# Patient Record
Sex: FEMALE | Race: WHITE | Employment: OTHER | URBAN - METROPOLITAN AREA
[De-identification: names, ages, dates, MRNs, and addresses within clinical notes are randomized per-mention and may not be internally consistent; named-entity substitution may affect disease eponyms.]

---

## 2017-01-03 ENCOUNTER — HOSPITAL ENCOUNTER (OUTPATIENT)
Dept: MAMMOGRAPHY | Age: 82
Discharge: HOME | End: 2017-01-03
Attending: FAMILY MEDICINE
Payer: MEDICARE

## 2017-01-03 DIAGNOSIS — Z12.31 ENCOUNTER FOR SCREENING MAMMOGRAM FOR BREAST CANCER: ICD-10-CM

## 2017-01-03 DIAGNOSIS — E28.39 ESTROGEN DEFICIENCY: ICD-10-CM

## 2017-01-03 PROCEDURE — 77080 DXA BONE DENSITY AXIAL: CPT

## 2017-01-03 PROCEDURE — 77063 BREAST TOMOSYNTHESIS BI: CPT

## 2017-01-04 PROBLEM — M81.0: Status: ACTIVE | Noted: 2017-01-04

## 2017-03-08 ENCOUNTER — TELEPHONE (OUTPATIENT)
Dept: FAMILY MEDICINE CLINIC | Age: 82
End: 2017-03-08

## 2017-03-08 NOTE — TELEPHONE ENCOUNTER
Patient states she [facility] last [facility]pm[facility] and they gave her 2 different medications one was called into the pharmacy -Inhaler - (has not picked up) and the other Doxycycline monohybrate -they told her not to fill the medication for 4-5 days   Patient was up all night coughing and would like to know if it would be ok for her to fill the medication.   Please call patient back at [phone]

## 2017-03-10 NOTE — TELEPHONE ENCOUNTER
Yes, I think she should take the prescriptions since she is not getting better. Please advise her she is due to be seen for her Medicare Wellness Visit; I would be happy to see her at [facility] if she wants to call [phone] for an appointment. Thank you!!  Ivon Carrizales MD 3/10/2017 8:10 AM

## 2017-03-10 NOTE — TELEPHONE ENCOUNTER
Refill request:   Requested Prescriptions     Pending Prescriptions Disp Refills    ibuprofen (MOTRIN) 600 mg tablet       Sig: Take 1 Tab by mouth two (2) times a day.

## 2017-03-10 NOTE — TELEPHONE ENCOUNTER
for return call. Dr Severino Lee is off and it doesn't look like she has ever prescribed this before. I need to talk to her about it. Spoke to pt and she states that Dr Severino Lee hasn't prescribed the med before. Pt is currently scheduled to see Dr Severino Lee o 4/18. Let pt know that she can take  mg prn and pt states that she is willing to do that. I can check with one of the other providers to see if they are ok with refilling the motrin 600 mg. I will call her back if they do. Other wise keep appt as planned and she can discuss with Dr Severino Lee.

## 2017-03-13 ENCOUNTER — TELEPHONE (OUTPATIENT)
Dept: FAMILY MEDICINE CLINIC | Age: 82
End: 2017-03-13

## 2017-03-13 RX ORDER — IBUPROFEN 600 MG/1
600 TABLET ORAL 2 TIMES DAILY
Qty: 180 TAB | Refills: 0 | Status: CANCELLED | OUTPATIENT
Start: 2017-03-13

## 2017-03-13 NOTE — TELEPHONE ENCOUNTER
If Dr Bennie Smith has never prescribed this for her before, I am not going to start something new on pt I havent seen. Looks like she has gerd issues anyway, so I am not sure that the Motrin is the best option for her anyway.  I would stick w/ otc preferably Tylenol anyway, or low dose Motrin until Dr Bennie Smith can address

## 2017-03-13 NOTE — TELEPHONE ENCOUNTER
Patient had picked up the antibiotic and is taking it. She stated she is wondering about how to take the Omeprazole, she had a couple questions about the long term use, is it safe, etc etc.     She is interested in scheduling the [facility] visit at [facility] and she stated she will call about that but is currently in the hospital with her .

## 2017-03-20 NOTE — TELEPHONE ENCOUNTER
MJ, would you check with her about what she needs now? OK to take the omeprazole for now; we can talk about long-term risks when she follows up with me.  Mary Jane Daily MD 3/20/2017 9:02 AM

## 2017-03-22 NOTE — TELEPHONE ENCOUNTER
Spoke to pt and advised that she can keep taking the omeprazole until her f/u appt. They can discuss then. Pt states understanding.

## 2017-04-18 ENCOUNTER — OFFICE VISIT (OUTPATIENT)
Dept: FAMILY MEDICINE CLINIC | Age: 82
End: 2017-04-18

## 2017-04-18 VITALS
HEART RATE: 81 BPM | HEIGHT: 62 IN | BODY MASS INDEX: 28.85 KG/M2 | RESPIRATION RATE: 18 BRPM | OXYGEN SATURATION: 97 % | SYSTOLIC BLOOD PRESSURE: 114 MMHG | WEIGHT: 156.8 LBS | DIASTOLIC BLOOD PRESSURE: 70 MMHG | TEMPERATURE: 98.1 F

## 2017-04-18 DIAGNOSIS — M79.671 RIGHT FOOT PAIN: ICD-10-CM

## 2017-04-18 DIAGNOSIS — G47.00 INSOMNIA, UNSPECIFIED TYPE: ICD-10-CM

## 2017-04-18 DIAGNOSIS — Z63.6 CAREGIVER BURDEN: ICD-10-CM

## 2017-04-18 DIAGNOSIS — M81.0 AGE-RELATED OSTEOPOROSIS WITHOUT CURRENT PATHOLOGICAL FRACTURE: ICD-10-CM

## 2017-04-18 DIAGNOSIS — Z00.00 MEDICARE ANNUAL WELLNESS VISIT, SUBSEQUENT: Primary | ICD-10-CM

## 2017-04-18 DIAGNOSIS — K21.9 GASTROESOPHAGEAL REFLUX DISEASE WITHOUT ESOPHAGITIS: ICD-10-CM

## 2017-04-18 RX ORDER — BISMUTH SUBSALICYLATE 262 MG
1 TABLET,CHEWABLE ORAL DAILY
COMMUNITY
Start: 2017-04-18

## 2017-04-18 RX ORDER — UREA 10 %
1 LOTION (ML) TOPICAL DAILY
COMMUNITY
Start: 2017-04-18 | End: 2017-10-17

## 2017-04-18 SDOH — SOCIAL STABILITY - SOCIAL INSECURITY: DEPENDENT RELATIVE NEEDING CARE AT HOME: Z63.6

## 2017-04-18 NOTE — MR AVS SNAPSHOT
Visit Information Date & Time Provider Department Dept. Phone Encounter #  
 4/18/2017  2:45 PM Albert Arriaza, [address] [phone] [id number] Follow-up Instructions Return in about 6 months (around 10/18/2017). Upcoming Health Maintenance Date Due DTaP/Tdap/Td series (1 - Tdap) 11/19/1956 ZOSTER VACCINE AGE 60> 11/19/1995 GLAUCOMA SCREENING Q2Y 11/19/2000 Pneumococcal 65+ Low/Medium Risk (1 of 2 - PCV13) 11/19/2000 MEDICARE YEARLY EXAM 11/19/2000 INFLUENZA AGE 9 TO ADULT 8/1/2016 Allergies as of 4/18/2017  Review Complete On: 4/18/2017 By: Albert Arriaza MD  
  
 Severity Noted Reaction Type Reactions Statins-hmg-coa Reductase Inhibitors  12/09/2016   Intolerance Myalgia Lots of different statins were tried Current Immunizations  Never Reviewed No immunizations on file. Not reviewed this visit You Were Diagnosed With   
  
 Codes Comments Medicare annual wellness visit, subsequent    -  Primary ICD-10-CM: Z00.00 ICD-9-CM: V70.0 Insomnia, unspecified type     ICD-10-CM: G47.00 ICD-9-CM: 780.52 Gastroesophageal reflux disease without esophagitis     ICD-10-CM: K21.9 ICD-9-CM: 530.81 Age-related osteoporosis without current pathological fracture     ICD-10-CM: M81.0 ICD-9-CM: 733.01 Right foot pain     ICD-10-CM: M79.671 ICD-9-CM: 729.5 Vitals BP Pulse Temp Resp Height(growth percentile) Weight(growth percentile) 114/70 (BP 1 Location: Right arm, BP Patient Position: Sitting) 81 98.1 °F (36.7 °C) (Oral) 18 5' 2\" (1.575 m) 156 lb 12.8 oz (71.1 kg) SpO2 BMI OB Status Smoking Status 97% 28.68 kg/m2 Postmenopausal Never Smoker Vitals History BMI and BSA Data Body Mass Index Body Surface Area  
 28.68 kg/m 2 1.76 m 2 Preferred Pharmacy Pharmacy Name Phone  [address] [facility] [phone] Your Updated Medication List  
  
   
This list is accurate as of: 17  4:04 PM.  Always use your most recent med list.  
  
  
  
  
 acyclovir 400 mg tablet Commonly known as:  ZOVIRAX Take 1 Tab by mouth two (2) times a day. To prevent cold sores CALCIUM 500 500 mg calcium (1,250 mg) chewable tablet Generic drug:  calcium carbonate Take 1 Tab by mouth daily. diph,Pertuss(Acell),Tet Vac-PF 2 Lf-(2.5-5-3-5 mcg)-5Lf/0.5 mL susp Commonly known as:  ADACEL  
0.5 mL by IntraMUSCular route once for 1 dose. FISH OIL PO Take  by mouth. 2 tablet a day  
  
 ibuprofen 600 mg tablet Commonly known as:  MOTRIN Take 600 mg by mouth daily. multivitamin tablet Commonly known as:  ONE A DAY Take 1 Tab by mouth daily. omeprazole 20 mg capsule Commonly known as:  PRILOSEC Take 1 Cap by mouth daily. pneumococcal 13 vincent conj dip 0.5 mL Syrg injection Commonly known as:  PREVNAR-13  
0.5 mL by IntraMUSCular route once for 1 dose. red yeast rice extract 600 mg Cap Take 600 mg by mouth two (2) times a day. Prescriptions Printed Refills  
 pneumococcal 13 vincent conj dip (PREVNAR-13) 0.5 mL syrg injection 0 Si.5 mL by IntraMUSCular route once for 1 dose. Class: Print Route: IntraMUSCular  
 diph,Pertuss,Acell,,Tet Vac-PF (ADACEL) 2 Lf-(2.5-5-3-5 mcg)-5Lf/0.5 mL susp 0 Si.5 mL by IntraMUSCular route once for 1 dose. Class: Print Route: IntraMUSCular Follow-up Instructions Return in about 6 months (around 10/18/2017). Patient Instructions Try the omeprazole every other day for 1 month, then stop If you have heartburn 2 or more days per week, go back to taking it daily Please ask Walluigi to send me your shot record Amanda Mathew MD 
[facility] [address], [address] [place] [address] Street [phone] [place] 
 
Natali Fowler MD 
 [facility] 
(medical dermatology) [address] [facility] [address] Phone: [phone] Fax: [phone] Osteoporosis: Care Instructions Your Care Instructions Osteoporosis causes bones to become thin and weak. It is much more common in women than in men. Osteoporosis may be very advanced before you know you have it. Sometimes the first sign is a broken bone in the hip, spine, or wrist or sudden pain in your middle or lower back. Follow-up care is a key part of your treatment and safety. Be sure to make and go to all appointments, and call your doctor if you are having problems. Its also a good idea to know your test results and keep a list of the medicines you take. How can you care for yourself at home? · Your doctor may prescribe a bisphosphonate, such as risedronate (Actonel) or alendronate (Fosamax), for osteoporosis. If you are taking one of these medicines by mouth: 
¨ Take your medicine with a full glass of water when you first get up in the morning. ¨ Do not lie down, eat, drink a beverage, or take any other medicine for at least 30 minutes after taking the drug. This helps prevent stomach problems. ¨ Do not take your medicine late in the day if you forgot to take it in the morning. Skip it, and take the usual dose the next morning. ¨ If you have side effects, tell your doctor. He or she may prescribe another medicine. · Get enough calcium and vitamin D. The [place] of Medicine recommends adults younger than age 46 need 1,000 mg of calcium and 600 IU of vitamin D each day. Women ages 46 to 79 need 1,200 mg of calcium and 600 IU of vitamin D each day. Men ages 46 to 79 need 1,000 mg of calcium and 600 IU of vitamin D each day. Adults 71 and older need 1,200 mg of calcium and 800 IU of vitamin D each day. Francisca Gonzalez ¨ Eat foods rich in calcium, like yogurt, cheese, milk, and dark green vegetables. This is a good way to get the calcium you need.  You can get vitamin D from eggs, fatty fish, cereal, and milk. ¨ Talk to your doctor about taking a calcium plus vitamin D supplement. Be careful, though. Adults ages 23 to 48 should not get more than 2,500 mg of calcium and 4,000 IU of vitamin D each day, whether it is from supplements and/or food. Adults ages 46 and older should not get more than 2,000 mg of calcium and 4,000 IU of vitamin D each day from supplements and/or food. · Limit alcohol to 2 drinks a day for men and 1 drink a day for women. Too much alcohol can cause health problems. · Do not smoke. Smoking puts you at a much higher risk for osteoporosis. If you need help quitting, talk to your doctor about stop-smoking programs and medicines. These can increase your chances of quitting for good. · Get regular bone-building exercise. Weight-bearing and resistance exercises keep bones healthy by working the muscles and bones against gravity. Start out at an exercise level that feels right for you. Add a little at a time until you can do the following: ¨ Do 30 minutes of weight-bearing exercise on most days of the week. Walking, jogging, stair climbing, and dancing are good choices. ¨ Do resistance exercises with weights or elastic bands 2 to 3 days a week. · Reduce your risk of falls: 
¨ Wear supportive shoes with low heels and nonslip soles. ¨ Use a cane or walker, if you need it. Use shower chairs and bath benches. Put in handrails on stairways, around your shower or tub area, and near the toilet. ¨ Keep stairs, porches, and walkways well lit. Use night-lights. ¨ Remove throw rugs and other objects that are in the way. ¨ Avoid icy, wet, or slippery surfaces. ¨ Keep a cordless phone and a flashlight with new batteries by your bed. When should you call for help? Call your doctor now or seek immediate medical care if: 
· You think you have broken a bone, have pain and swelling after a fall, or cannot move a part of your body. · You have sudden, severe pain when you stand or walk. Watch closely for changes in your health, and be sure to contact your doctor if you have any problems. Where can you learn more? Go to http://anne-brian.info/. Enter K100 in the search box to learn more about \"Osteoporosis: Care Instructions. \" Current as of: August 4, 2016 Content Version: 11.2 © 1804-6130 [employer]. Care instructions adapted under license by [employer] (which disclaims liability or warranty for this information). If you have questions about a medical condition or this instruction, always ask your healthcare professional. [place] [address] any warranty or liability for your use of this information. (Preventive care checklist to be included in patient instructions) Discussed today Done Previously Not Needed X -13 X -23  Pneumococcal vaccines  
 x  Flu vaccine  
  x Hepatitis B vaccine (if at risk) x  Shingles vaccine  
x   TDAP vaccine  
 x  Mammogram  
  X normal Pap smear  
 x  Colorectal cancer screening  
  x Low-dose CT for lung cancer screening  
 x  Bone density test  
 x  Glaucoma screening  
 x  Cholesterol test  
 x  Diabetes screening test   
  x Diabetes self-management class  
  x Nutritionist referral for diabetes or renal disease Introducing [facility]! [facility] introduces Oration patient portal. Now you can access parts of your medical record, email your doctor's office, and request medication refills online. 1. In your internet browser, go to https://"[employer]". [employer]/"[employer]" 2. Click on the First Time User? Click Here link in the Sign In box. You will see the New Member Sign Up page. 3. Enter your Oration Access Code exactly as it appears below. You will not need to use this code after youve completed the sign-up process. If you do not sign up before the expiration date, you must request a new code. · Altobridge Access Code: [email or web address] Expires: 7/17/2017  2:49 PM 
 
4. Enter the last four digits of your Social Security Number (xxxx) and Date of Birth (mm/dd/yyyy) as indicated and click Submit. You will be taken to the next sign-up page. 5. Create a Altobridge ID. This will be your Altobridge login ID and cannot be changed, so think of one that is secure and easy to remember. 6. Create a Altobridge password. You can change your password at any time. 7. Enter your Password Reset Question and Answer. This can be used at a later time if you forget your password. 8. Enter your e-mail address. You will receive e-mail notification when new information is available in [address] 9. Click Sign Up. You can now view and download portions of your medical record. 10. Click the Download Summary menu link to download a portable copy of your medical information. If you have questions, please visit the Frequently Asked Questions section of the Altobridge website. Remember, Altobridge is NOT to be used for urgent needs. For medical emergencies, dial 911. Now available from your iPhone and Android! Please provide this summary of care documentation to your next provider. Your primary care clinician is listed as Martha Monaco. If you have any questions after today's visit, please call [phone].

## 2017-04-18 NOTE — PATIENT INSTRUCTIONS
Try the omeprazole every other day for 1 month, then stop  If you have heartburn 2 or more days per week, go back to taking it daily    Please ask Katia to send me your shot record    Angela Odell MD  [facility]  [address]   [phone]  Moment    Vishal Travis MD  [facility]  (medical dermatology)  [facility] [address]  Phone: [phone]  Fax: [phone]             Osteoporosis: Care Instructions  Your Care Instructions    Osteoporosis causes bones to become thin and weak. It is much more common in women than in men. Osteoporosis may be very advanced before you know you have it. Sometimes the first sign is a broken bone in the hip, spine, or wrist or sudden pain in your middle or lower back. Follow-up care is a key part of your treatment and safety. Be sure to make and go to all appointments, and call your doctor if you are having problems. Its also a good idea to know your test results and keep a list of the medicines you take. How can you care for yourself at home? · Your doctor may prescribe a bisphosphonate, such as risedronate (Actonel) or alendronate (Fosamax), for osteoporosis. If you are taking one of these medicines by mouth:  ¨ Take your medicine with a full glass of water when you first get up in the morning. ¨ Do not lie down, eat, drink a beverage, or take any other medicine for at least 30 minutes after taking the drug. This helps prevent stomach problems. ¨ Do not take your medicine late in the day if you forgot to take it in the morning. Skip it, and take the usual dose the next morning. ¨ If you have side effects, tell your doctor. He or she may prescribe another medicine. · Get enough calcium and vitamin D. The [place] of Medicine recommends adults younger than age 46 need 1,000 mg of calcium and 600 IU of vitamin D each day.  Women ages 46 to 79 need 1,200 mg of calcium and 600 IU of vitamin D each day. Men ages 46 to 79 need 1,000 mg of calcium and 600 IU of vitamin D each day. Adults 71 and older need 1,200 mg of calcium and 800 IU of vitamin D each day. Fernando Ramírez Eat foods rich in calcium, like yogurt, cheese, milk, and dark green vegetables. This is a good way to get the calcium you need. You can get vitamin D from eggs, fatty fish, cereal, and milk. ¨ Talk to your doctor about taking a calcium plus vitamin D supplement. Be careful, though. Adults ages 23 to 48 should not get more than 2,500 mg of calcium and 4,000 IU of vitamin D each day, whether it is from supplements and/or food. Adults ages 46 and older should not get more than 2,000 mg of calcium and 4,000 IU of vitamin D each day from supplements and/or food. · Limit alcohol to 2 drinks a day for men and 1 drink a day for women. Too much alcohol can cause health problems. · Do not smoke. Smoking puts you at a much higher risk for osteoporosis. If you need help quitting, talk to your doctor about stop-smoking programs and medicines. These can increase your chances of quitting for good. · Get regular bone-building exercise. Weight-bearing and resistance exercises keep bones healthy by working the muscles and bones against gravity. Start out at an exercise level that feels right for you. Add a little at a time until you can do the following:  ¨ Do 30 minutes of weight-bearing exercise on most days of the week. Walking, jogging, stair climbing, and dancing are good choices. ¨ Do resistance exercises with weights or elastic bands 2 to 3 days a week. · Reduce your risk of falls:  ¨ Wear supportive shoes with low heels and nonslip soles. ¨ Use a cane or walker, if you need it. Use shower chairs and bath benches. Put in handrails on stairways, around your shower or tub area, and near the toilet. ¨ Keep stairs, porches, and walkways well lit. Use night-lights.   ¨ Remove throw rugs and other objects that are in the way.  ¨ Avoid icy, wet, or slippery surfaces. ¨ Keep a cordless phone and a flashlight with new batteries by your bed. When should you call for help? Call your doctor now or seek immediate medical care if:  · You think you have broken a bone, have pain and swelling after a fall, or cannot move a part of your body. · You have sudden, severe pain when you stand or walk. Watch closely for changes in your health, and be sure to contact your doctor if you have any problems. Where can you learn more? Go to http://anne-brian.info/. Enter K100 in the search box to learn more about \"Osteoporosis: Care Instructions. \"  Current as of: August 4, 2016  Content Version: 11.2  © 8665-0534 [employer] Care instructions adapted under license by [employer] (which disclaims liability or warranty for this information). If you have questions about a medical condition or this instruction, always ask your healthcare professional. [place] [address] any warranty or liability for your use of this information.   (Preventive care checklist to be included in patient instructions)  Discussed today Done Previously Not Needed    X -13 X -23  Pneumococcal vaccines    x  Flu vaccine     x Hepatitis B vaccine (if at risk)    x  Shingles vaccine   x   TDAP vaccine    x  Mammogram     X normal Pap smear    x  Colorectal cancer screening     x Low-dose CT for lung cancer screening    x  Bone density test    x  Glaucoma screening    x  Cholesterol test    x  Diabetes screening test      x Diabetes self-management class     x Nutritionist referral for diabetes or renal disease

## 2017-04-18 NOTE — PROGRESS NOTES
Chief Complaint   Patient presents with    GERD     follow up    Cholesterol Problem     follow up       Reviewed Record in preparation for visit and have obtained necessary documentation. Identified pt with two pt identifiers (Name @ )    Health Maintenance Due   Topic    DTaP/Tdap/Td series (1 - Tdap)    ZOSTER VACCINE AGE 60>     GLAUCOMA SCREENING Q2Y     Pneumococcal 65+ Low/Medium Risk (1 of 2 - PCV13)    MEDICARE YEARLY EXAM     INFLUENZA AGE 9 TO ADULT          1. Have you been to the ER, urgent care clinic since your last visit? Hospitalized since your last visit? [facility]  for sinus infection. 2. Have you seen or consulted any other health care providers outside of the [address] [place] [address] since your last visit? Include any pap smears or colon screening.  No

## 2017-04-18 NOTE — PROGRESS NOTES
[facility]  [address] [place], [address]  [phone]           Date of visit: 4/18/17       This is an Subsequent Medicare Annual Wellness Visit (AWV), (Performed more than 12 months after effective date of Medicare Part B enrollment and 12 months after last preventive visit, Once in a lifetime)    I have reviewed the patient's medical history in detail and updated the computerized patient record. Yu Youngblood is a 80 y.o. female   History obtained from: the patient. Concerns today   (Patient understands that medical problems addressed today may incur additional cost as this is a preventive visit)  -having difficulty sleeping, occasionally needs melatonin, it does help some   was in the hospital, home now, but long hospitalization, wore her out  The hospital did give her some resources, people to call  -tried cutting omeprazole to every other day but had more acid reflux. However, would like to try it again. Has done all she can with diet.  -did not get my letter about the osteoporosis, she is willing to try the Reclast as I suggested. Does do weight-bearing exercise. Not on calcium or D currently.  -chronic right foot pain and sometimes swelling since injury last year. PT really didn't help. Has tried various shoes.  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    Caregiver burden Z63.6     Past Medical History:   Diagnosis Date    Arthritis     Gastrointestinal disorder     gerd    GERD (gastroesophageal reflux disease)     Ill-defined condition     cholesterol      Past Surgical History:   Procedure Laterality Date    HX BREAST BIOPSY  10-15 yrs ago    neg path, unsure which breast    HX COLONOSCOPY  01/01/2012    approx date, normal    HX ENDOSCOPY      no ulcers    HX ORTHOPAEDIC      right shoulder    HX WISDOM TEETH EXTRACTION       Allergies   Allergen Reactions    Statins-Hmg-Coa Reductase Inhibitors Myalgia     Lots of different statins were tried     Current Outpatient Prescriptions   Medication Sig Dispense Refill    multivitamin (ONE A DAY) tablet Take 1 Tab by mouth daily.  calcium carbonate (CALCIUM 500) 500 mg calcium (1,250 mg) chewable tablet Take 1 Tab by mouth daily.  omeprazole (PRILOSEC) 20 mg capsule Take 1 Cap by mouth daily. 90 Cap 3    acyclovir (ZOVIRAX) 400 mg tablet Take 1 Tab by mouth two (2) times a day. To prevent cold sores 180 Tab 3    DOCOSAHEXANOIC ACID/EPA (FISH OIL PO) Take  by mouth. 2 tablet a day      ibuprofen (MOTRIN) 600 mg tablet Take 600 mg by mouth daily.  red yeast rice extract 600 mg cap Take 600 mg by mouth two (2) times a day. Family History   Problem Relation Age of Onset    Hypertension Mother     Kidney Disease Mother      dialysis    No Known Problems Father     Cancer Brother      tongue, lung     Social History   Substance Use Topics    Smoking status: Never Smoker    Smokeless tobacco: Not on file    Alcohol use Yes      Comment: mixed drink or wine 3-4x per week       Specialists/Care Team   Zafar Charles has established care with the following healthcare providers:  Patient Care Team:  Mario Jeronimo MD as PCP - General (Family Practice)  Tal Price MD (Dermatology)  Sheila Buchanan someone is her eye doctor    Health Risk Assessment     Demographics   female  80 y.o.     General Health Questions   -During the past 4 weeks:   -how would you rate your health in general? Good   -how often have you been bothered by feeling dizzy when standing up? never   -how much have you been bothered by bodily pain? Mildly, just arthritis in back   -Have you noticed any hearing difficulties? Yes, has hearing aids   -has your physical and emotional health limited your social activities with family or friends? No, but her 's does    Emotional Health Questions   -Do you have a history of depression, anxiety, or emotional problems? no  -Over the past 2 weeks, have you felt down, depressed or hopeless? no  -Over the past 2 weeks, have you felt little interest or pleasure in doing things? no    Health Habits   Please describe your diet habits: eats well, water,   Do you get 5 servings of fruits or vegetables daily? Yes, or close to it  Do you exercise regularly? Yes (less since  sick but likes to play [employer])    Activities of Daily Living and Functional Status   -Do you need help with eating, walking, dressing, bathing, toileting, the phone, transportation, shopping, preparing meals, housework, laundry, medications or managing money? no  -In the past four weeks, was someone available to help you if you needed and wanted help with anything? yes  -Are you confident are you that you can control and manage most of your health problems? yes  -Have you been given information to help you keep track of your medications? yes  -How often do you have trouble taking your medications as prescribed? never    Fall Risk and Home Safety   Have you fallen 2 or more times in the past year? no  Does your home have rugs in the hallway, lack grab bars in the bathroom, lack handrails on the stairs or have poor lighting? no  Do you have smoke detectors and check them regularly?  yes  Do you have difficulties driving a car? no  Do you always fasten your seat belt when you are in a car? yes    Review of Systems (if indicated for problems addressed today)   Card: denies chest pain  Pulm: denies shortness of breath      Physical Examination     Vitals:    04/18/17 1502   BP: 114/70   Pulse: 81   Resp: 18   Temp: 98.1 °F (36.7 °C)   TempSrc: Oral   SpO2: 97%   Weight: 156 lb 12.8 oz (71.1 kg)   Height: 5' 2\" (1.575 m)     Body mass index is 28.68 kg/(m^2). No exam data present  Was the patient's timed Up & Go test unsteady or longer than 30 seconds? no    Evaluation of Cognitive Function   Mood/affect:  happy  Orientation: normal  Appearance: age appropriate  Family member/caregiver input: none    Additional exam if indicated for problems addressed today:  General: stated age, well developed, well nourished and in NAD  Neck: supple, symmetrical, trachea midline, no adenopathy and thyroid: not enlarged, symmetric, no tenderness/mass/nodules  Lungs:  clear to auscultation w/o rales, rhonchi, wheezes w/normal effort and no use of accessory muscles of respiration   Heart: regular rate and rhythm, S1, S2 normal, no murmur, click, rub or gallop  Abdomen: soft, nontender, no masses, BS normal  Ext:  No edema noted.  Mild tenderness right mid-foot dorsally  Lymph: no cervical adenopathy appreciated  Skin:  Normal. and no rash or abnormalities   Psych: alert and oriented to person, place, time and situation and Speech: appropriate quality, quantity and organization of sentences      Advice/Referrals/Counseling (as indicated)   Education and counseling provided for any problems identified above: diet, exercise    Preventive Services   (Preventive care checklist to be included in patient instructions)  Discussed today Done Previously Not Needed    X -13 X -23  Pneumococcal vaccines    x  Flu vaccine     x Hepatitis B vaccine (if at risk)    x  Shingles vaccine   x   TDAP vaccine    x  Mammogram     X normal Pap smear    x  Colorectal cancer screening     x Low-dose CT for lung cancer screening    x  Bone density test    x  Glaucoma screening    x  Cholesterol test    x  Diabetes screening test      x Diabetes self-management class     x Nutritionist referral for diabetes or renal disease     Discussion of Advance Directive   Discussed with A[facility]e [facility] her ability to prepare and advance directive in the case that an injury or illness causes her to be unable to make health care decisions. Has one, will bring us a record    Assessment/Plan   Z00.00    ICD-10-CM ICD-9-CM    1. Medicare annual wellness visit, subsequent Z00.00 V70.0    2. Insomnia, unspecified type G47.00 780.52    3. Gastroesophageal reflux disease without esophagitis K21.9 530.81    4. Age-related osteoporosis without current pathological fracture M81.0 733.01 REFERRAL TO INFUSION THERAPY   5. Right foot pain M79.671 729.5    6. Caregiver burden Z63.6 V61.49        Orders Placed This Encounter    REFERRAL TO INFUSION THERAPY    pneumococcal 13 vincent conj dip (PREVNAR-13) 0.5 mL syrg injection    diph,Pertuss,Acell,,Tet Vac-PF (ADACEL) 2 Lf-(2.5-5-3-5 mcg)-5Lf/0.5 mL susp    multivitamin (ONE A DAY) tablet    calcium carbonate (CALCIUM 500) 500 mg calcium (1,250 mg) chewable tablet     Overall very healthy for her age; preventive care as above  Advised that occasional melantonin was fine. Seems she will probably sleep better when stress of 's illness is better. He is improving but does have dementia, giving her a large caregiver burden. She seems to cope amazingly well and doesn't seem depressed. Has family support as well    Will refer for reclast for bones. i could not give her oral bisphosphonates with her long history of GERD    She wants to try to wean PPI again but will go back on it daily if having frequent GERD symptoms  Really tries hard with diet for that. Follow-up Disposition:  Return in about 6 months (around 10/18/2017).     Kimber Cummings MD

## 2017-04-18 NOTE — LETTER
Erasm[employer] Navarro introduces [employer] patient portal. Now you can access parts of your medical record, email your doctor's office, and request medication refills online. 1. In your internet browser, go to [email or web address] 
2. Click on the First Time User? Click Here link in the Sign In box. You will see the New Member Sign Up page. 3. Enter your [employer] Access Code exactly as it appears below. You will not need to use this code after youve completed the sign-up process. If you do not sign up before the expiration date, you must request a new code. · [employer] Access [email or web address] 
· Expires: 7/17/2017  2:49 PM 
 
4. Enter the last four digits of your Social Security Number (xxxx) and Date of Birth (mm/dd/yyyy) as indicated and click Submit. You will be taken to the next sign-up page. 5. Create a [employer] ID. This will be your [employer] login ID and cannot be changed, so think of one that is secure and easy to remember. 6. Create a [employer] password. You can change your password at any time. 7. Enter your Password Reset Question and Answer. This can be used at a later time if you forget your password. 8. Enter your e-mail address. You will receive e-mail notification when new information is available in [address] 9. Click Sign Up. You can now view and download portions of your medical record. 10. Click the Download Summary menu link to download a portable copy of your medical information. If you have questions, please visit the Frequently Asked Questions section of the [employer] website. Remember, [employer] is NOT to be used for urgent needs. For medical emergencies, dial 911. Now available from your iPhone and Android!

## 2017-04-19 PROBLEM — Z63.6 CAREGIVER BURDEN: Status: ACTIVE | Noted: 2017-04-19

## 2017-10-17 ENCOUNTER — OFFICE VISIT (OUTPATIENT)
Dept: FAMILY MEDICINE CLINIC | Age: 82
End: 2017-10-17

## 2017-10-17 VITALS
DIASTOLIC BLOOD PRESSURE: 66 MMHG | WEIGHT: 155 LBS | HEART RATE: 65 BPM | SYSTOLIC BLOOD PRESSURE: 118 MMHG | HEIGHT: 62 IN | TEMPERATURE: 97.9 F | BODY MASS INDEX: 28.52 KG/M2 | RESPIRATION RATE: 16 BRPM | OXYGEN SATURATION: 97 %

## 2017-10-17 DIAGNOSIS — Z63.6 CAREGIVER BURDEN: ICD-10-CM

## 2017-10-17 DIAGNOSIS — R10.2 VAGINAL PAIN: ICD-10-CM

## 2017-10-17 DIAGNOSIS — E78.00 PURE HYPERCHOLESTEROLEMIA: ICD-10-CM

## 2017-10-17 DIAGNOSIS — Z23 ENCOUNTER FOR IMMUNIZATION: ICD-10-CM

## 2017-10-17 DIAGNOSIS — M54.50 CHRONIC BILATERAL LOW BACK PAIN WITHOUT SCIATICA: ICD-10-CM

## 2017-10-17 DIAGNOSIS — R79.89 ELEVATED TSH: ICD-10-CM

## 2017-10-17 DIAGNOSIS — G89.29 CHRONIC BILATERAL LOW BACK PAIN WITHOUT SCIATICA: ICD-10-CM

## 2017-10-17 DIAGNOSIS — L65.9 HAIR LOSS: ICD-10-CM

## 2017-10-17 DIAGNOSIS — M81.0 OSTEOPOROSIS OF FOREARM WITHOUT PATHOLOGICAL FRACTURE: ICD-10-CM

## 2017-10-17 DIAGNOSIS — K21.9 GASTROESOPHAGEAL REFLUX DISEASE WITHOUT ESOPHAGITIS: Primary | ICD-10-CM

## 2017-10-17 SDOH — SOCIAL STABILITY - SOCIAL INSECURITY: DEPENDENT RELATIVE NEEDING CARE AT HOME: Z63.6

## 2017-10-17 NOTE — PATIENT INSTRUCTIONS
To wean your prilosec (omeprazole): Take 1 pill every other day for 1 month, then stop it. You can use over-the-counter zantac as needed for heartburn. However, if you get heartburn or reflux symptoms more than 2x per week, take zantac 2x daily every day to prevent reflux. If that is not enough to relieve your symptoms, go back on the daily prilosec. Zoledronic Acid (By injection)   Zoledronic Acid (ute-lo-KKIT-ik AS-id)  Treats high blood calcium levels. Also treats bone damage caused by Paget disease, multiple myeloma, and cancers that spread to the bone. Also treats osteoporosis and reduces the risk of hip fractures in certain patients. Brand Name(s): [facility] Rx Zoledronic Acid, Reclast, Zoledronic Acid Novaplus, Zometa   There may be other brand names for this medicine. When This Medicine Should Not Be Used: This medicine is not right for everyone. You should not receive it if you had an allergic reaction to zoledronic acid, or if you are pregnant. How to Use This Medicine:   Injectable  · A nurse or other health provider will give you this medicine. · Your doctor will prescribe your dose and schedule. This medicine is given through a needle placed in a vein. · Your doctor may tell you to drink extra liquids before your treatment to prevent kidney problems. · Your doctor may also give you vitamin D and calcium supplements. Tell your doctor if you are not able to take these medicines. · This medicine should come with a Medication Guide. Ask your pharmacist for a copy if you do not have one. · Missed dose: You must use this medicine on a fixed schedule. Call your doctor or pharmacist if you miss a dose. Drugs and Foods to Avoid:   Ask your doctor or pharmacist before using any other medicine, including over-the-counter medicines, vitamins, and herbal products. · Do not use other medicines that also contain zoledronic acid.  Do not use zoledronic acid together with another bisphosphonate medicine. · Some foods and medicines can affect how zoledronic acid works. Tell your doctor if you are using digoxin, antibiotics, diuretics (water pills), an NSAID pain or arthritis medicine (such as aspirin, celecoxib, ibuprofen, naproxen), steroid medicines, or cancer medicines. Warnings While Using This Medicine:   · It is not safe to take this medicine during pregnancy. It could harm an unborn baby. Tell your doctor right away if you become pregnant. · Tell your doctor if you are breastfeeding, or if you have kidney disease, anemia, aspirin-sensitive asthma, bleeding problems, cancer, congestive heart failure, low blood calcium levels, stomach absorption problems, mineral imbalance, dental problems or gum disease. Also tell your doctor if you had surgery on your bowel or parathyroid or thyroid gland. · This medicine may cause the following problems:  ¨ Jaw or teeth problems  ¨ Severe bone, joint, or muscle pain  ¨ Increased risk of thigh bone fracture  ¨ Low calcium levels in your blood  · You must have regular dental exams while you are being treated with this medicine. Tell your dentist or oral surgeon that you are using this medicine. · Your doctor will do lab tests at regular visits to check on the effects of this medicine. Keep all appointments.   Possible Side Effects While Using This Medicine:   Call your doctor right away if you notice any of these side effects:  · Allergic reaction: Itching or hives, swelling in your face or hands, swelling or tingling in your mouth or throat, chest tightness, trouble breathing  · Chest pain, trouble breathing, fast or uneven heartbeat  · Decrease in how much or how often you urinate, blood in the urine, lower back or side pain, burning or painful urination  · Muscle spasm or twitching, or numbness or tingling in your fingers, feet, or around your mouth  · Pain, swelling, or numbness in the mouth or jaw, loose teeth or other teeth problems  · Severe muscle, bone, or joint pain  · Unusual pain in your thigh, groin, or hip  If you notice these less serious side effects, talk with your doctor:   · Fever, chills, cough, sore throat, and body aches  · Headache  · Mild nausea, constipation, diarrhea, stomach pain or upset  · Redness, pain, or swelling of your skin where the needle is placed  If you notice other side effects that you think are caused by this medicine, tell your doctor. Call your doctor for medical advice about side effects. You may report side effects to FDA at 6-945-MBM-8050  © 2017 [facility] Information is for End User's use only and may not be sold, redistributed or otherwise used for commercial purposes. The above information is an  only. It is not intended as medical advice for individual conditions or treatments. Talk to your doctor, nurse or pharmacist before following any medical regimen to see if it is safe and effective for you. Resources to help caregivers of dementia patients:    Local expert Nakul Roberts has occasional one-day workshops to train caregivers, as well as online webinars:  [email or web address]. [employer]/    [employer] caregiver events in [facility] area:    http://kamran-nuno.[email or web address] asp#Luke

## 2017-10-17 NOTE — PROGRESS NOTES
Chief Complaint   Patient presents with    Insomnia     follow up    GERD     follow up       Reviewed Record in preparation for visit and have obtained necessary documentation. Identified pt with two pt identifiers (Name @ )    Health Maintenance Due   Topic    ZOSTER VACCINE AGE 60>     GLAUCOMA SCREENING Q2Y     Pneumococcal 65+ Low/Medium Risk (1 of 2 - PCV13)    INFLUENZA AGE 9 TO ADULT          1. Have you been to the ER, urgent care clinic since your last visit? Hospitalized since your last visit? No    2. Have you seen or consulted any other health care providers outside of the [address] [place] [address] since your last visit? Include any pap smears or colon screening.  No

## 2017-10-17 NOTE — MR AVS SNAPSHOT
Visit Information Date & Time Provider Department Dept. Phone Encounter #  
 10/17/2017  1:45 PM Rebecca Herrera, [address] [phone] [id number] Follow-up Instructions Return in about 6 months (around 4/17/2018) for Annual Wellness Visit. Upcoming Health Maintenance Date Due ZOSTER VACCINE AGE 60> 9/19/1995 GLAUCOMA SCREENING Q2Y 11/19/2000 Pneumococcal 65+ Low/Medium Risk (1 of 2 - PCV13) 11/19/2000 INFLUENZA AGE 9 TO ADULT 8/1/2017 MEDICARE YEARLY EXAM 4/19/2018 DTaP/Tdap/Td series (2 - Td) 7/30/2025 Allergies as of 10/17/2017  Review Complete On: 10/17/2017 By: Rebecca Herrera MD  
  
 Severity Noted Reaction Type Reactions Statins-hmg-coa Reductase Inhibitors  12/09/2016   Intolerance Myalgia Lots of different statins were tried Current Immunizations  Reviewed on 10/6/2017 Name Date Influenza High Dose Vaccine PF 10/17/2017 Tdap 7/30/2015 Not reviewed this visit You Were Diagnosed With   
  
 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  
 118/66 (BP 1 Location: Right arm, BP Patient Position: Sitting) 65 97.9 °F (36.6 °C) (Oral) 16 5' 2\" (1.575 m) 155 lb (70.3 kg) SpO2 BMI OB Status Smoking Status 97% 28.35 kg/m2 Postmenopausal Never Smoker BMI and BSA Data Body Mass Index Body Surface Area  
 28.35 kg/m 2 1.75 m 2 Preferred Pharmacy Pharmacy Name Phone [address] [facility] [phone] Your Updated Medication List  
  
   
This list is accurate as of: 10/17/17  3:06 PM.  Always use your most recent med list.  
  
  
  
  
 acyclovir 400 mg tablet Commonly known as:  ZOVIRAX Take 1 Tab by mouth two (2) times a day. To prevent cold sores FISH OIL PO Take  by mouth. 2 tablet a day  
  
 ibuprofen 600 mg tablet Commonly known as:  MOTRIN Take 600 mg by mouth daily. multivitamin tablet Commonly known as:  ONE A DAY Take 1 Tab by mouth daily. omeprazole 20 mg capsule Commonly known as:  PRILOSEC Take 1 Cap by mouth daily. red yeast rice extract 600 mg Cap Take 600 mg by mouth two (2) times a day. We Performed the Following CBC WITH AUTOMATED DIFF [08027 CPT(R)] INFLUENZA VIRUS VACCINE, HIGH DOSE SEASONAL, PRESERVATIVE FREE [96088 CPT(R)] LIPID PANEL [02875 CPT(R)] METABOLIC PANEL, COMPREHENSIVE [21259 CPT(R)] T4, FREE [id number] CPT(R)] TSH 3RD GENERATION [31477 CPT(R)] Follow-up Instructions Return in about 6 months (around 4/17/2018) for Annual Wellness Visit. Patient Instructions To wean your prilosec (omeprazole): Take 1 pill every other day for 1 month, then stop it. You can use over-the-counter zantac as needed for heartburn. However, if you get heartburn or reflux symptoms more than 2x per week, take zantac 2x daily every day to prevent reflux. If that is not enough to relieve your symptoms, go back on the daily prilosec. Zoledronic Acid (By injection) Zoledronic Acid ([id number]-[id number] AS-id) Treats high blood calcium levels.  Also treats bone damage caused by Paget disease, multiple myeloma, and cancers that spread to the bone. Also treats osteoporosis and reduces the risk of hip fractures in certain patients. Brand Name(s): PremierPro Rx Zoledronic Acid, Reclast, Zoledronic Acid Novaplus, Zometa There may be other brand names for this medicine. When This Medicine Should Not Be Used: This medicine is not right for everyone. You should not receive it if you had an allergic reaction to zoledronic acid, or if you are pregnant. How to Use This Medicine:  
Injectable · A nurse or other health provider will give you this medicine. · Your doctor will prescribe your dose and schedule. This medicine is given through a needle placed in a vein. · Your doctor may tell you to drink extra liquids before your treatment to prevent kidney problems. · Your doctor may also give you vitamin D and calcium supplements. Tell your doctor if you are not able to take these medicines. · This medicine should come with a Medication Guide. Ask your pharmacist for a copy if you do not have one. · Missed dose: You must use this medicine on a fixed schedule. Call your doctor or pharmacist if you miss a dose. Drugs and Foods to Avoid: Ask your doctor or pharmacist before using any other medicine, including over-the-counter medicines, vitamins, and herbal products. · Do not use other medicines that also contain zoledronic acid. Do not use zoledronic acid together with another bisphosphonate medicine. · Some foods and medicines can affect how zoledronic acid works. Tell your doctor if you are using digoxin, antibiotics, diuretics (water pills), an NSAID pain or arthritis medicine (such as aspirin, celecoxib, ibuprofen, naproxen), steroid medicines, or cancer medicines. Warnings While Using This Medicine: · It is not safe to take this medicine during pregnancy. It could harm an unborn baby. Tell your doctor right away if you become pregnant. · Tell your doctor if you are breastfeeding, or if you have kidney disease, anemia, aspirin-sensitive asthma, bleeding problems, cancer, congestive heart failure, low blood calcium levels, stomach absorption problems, mineral imbalance, dental problems or gum disease. Also tell your doctor if you had surgery on your bowel or parathyroid or thyroid gland. · This medicine may cause the following problems: ¨ Jaw or teeth problems ¨ Severe bone, joint, or muscle pain ¨ Increased risk of thigh bone fracture ¨ Low calcium levels in your blood · You must have regular dental exams while you are being treated with this medicine. Tell your dentist or oral surgeon that you are using this medicine. · Your doctor will do lab tests at regular visits to check on the effects of this medicine. Keep all appointments. Possible Side Effects While Using This Medicine:  
Call your doctor right away if you notice any of these side effects: · Allergic reaction: Itching or hives, swelling in your face or hands, swelling or tingling in your mouth or throat, chest tightness, trouble breathing · Chest pain, trouble breathing, fast or uneven heartbeat · Decrease in how much or how often you urinate, blood in the urine, lower back or side pain, burning or painful urination · Muscle spasm or twitching, or numbness or tingling in your fingers, feet, or around your mouth · Pain, swelling, or numbness in the mouth or jaw, loose teeth or other teeth problems · Severe muscle, bone, or joint pain · Unusual pain in your thigh, groin, or hip If you notice these less serious side effects, talk with your doctor: · Fever, chills, cough, sore throat, and body aches · Headache · Mild nausea, constipation, diarrhea, stomach pain or upset · Redness, pain, or swelling of your skin where the needle is placed If you notice other side effects that you think are caused by this medicine, tell your doctor. Call your doctor for medical advice about side effects. You may report side effects to FDA at 3-268-FDA-5334 © 2017 [address] Information is for End User's use only and may not be sold, redistributed or otherwise used for commercial purposes. The above information is an  only. It is not intended as medical advice for individual conditions or treatments. Talk to your doctor, nurse or pharmacist before following any medical regimen to see if it is safe and effective for you. Resources to help caregivers of dementia patients: 
 
Local expert Echo Barnett has occasional one-day workshops to train caregivers, as well as online webinars: 
Http://[employer]/ 
 
[employer] caregiver events in [place] area: 
 
[email or web address]-reina.org/. asp#Luke Introducing \[facility]\"" & HEALTH SERVICES! [employer] introduces [employer] patient portal. Now you can access parts of your medical record, email your doctor's office, and request medication refills online. 1. In your internet browser, go to [email or web address] 2. Click on the First Time User? Click Here link in the Sign In box. You will see the New Member Sign Up page. 3. Enter your [employer] Access Code exactly as it appears below. You will not need to use this code after youve completed the sign-up process. If you do not sign up before the expiration date, you must request a new code. · [employer] Access Code: [email or web address] Expires: 1/15/2018  2:32 PM 
 
4. Enter the last four digits of your Social Security Number (xxxx) and Date of Birth (mm/dd/yyyy) as indicated and click Submit. You will be taken to the next sign-up page. 5. Create a [employer] ID. This will be your [employer] login ID and cannot be changed, so think of one that is secure and easy to remember. 6. Create a [employer] password. You can change your password at any time. 7. Enter your Password Reset Question and Answer. This can be used at a later time if you forget your password. 8. Enter your e-mail address. You will receive e-mail notification when new information is available in [address] 9. Click Sign Up. You can now view and download portions of your medical record. 10. Click the Download Summary menu link to download a portable copy of your medical information. If you have questions, please visit the Frequently Asked Questions section of the [employer] website. Remember, [employer] is NOT to be used for urgent needs. For medical emergencies, dial 911. Now available from your iPhone and Android! Please provide this summary of care documentation to your next provider. Your primary care clinician is listed as Jagdish Sandoval. If you have any questions after today's visit, please call [phone].

## 2017-10-17 NOTE — PROGRESS NOTES
Jesús Christie [address] [facility]  [id number] [place] [address] [address] [facility], [address]  [phone]             Date of visit: 10/17/17   Subjective:      History obtained from:  the patient. Brian Queen is a 80 y.o. female who presents today for f/u chronic problems  Feeling well overall  Staying active      Would like to get off PPI, has tried stopping it but did not go well  Would like to know how to wean it    Life still hard with being caregiver for  with dementia, he is not declining too quickly. Getting out of the house to play pickle ball has been good for her. No longer on calcium but has smoothie daily so gets dairy  Did not have reclast    Hair falling out all over for about a year  Used to be really thick and coarse  Neither parents lost their hair    Vaginal shooting pain happened this am  Happened when she stood up  Has had this on and off for 3 years  Very infrequent  Has not noticed prolapse or other problems there    Would like dermatologist closer to her   sees [facility], that might be good location    Takes occasional ibuprofen for low back pain but not often  Back pain chronic not getting worse    Patient Active Problem List    Diagnosis Date Noted    Caregiver burden 04/19/2017    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  omeprazole (PRILOSEC) 20 mg capsule Take 1 Cap by mouth daily. 90 Cap 3    acyclovir (ZOVIRAX) 400 mg tablet Take 1 Tab by mouth two (2) times a day.  To prevent cold sores 180 Tab 3    DOCOSAHEXANOIC ACID/EPA (FISH OIL PO) Take  by mouth. 2 tablet a day      ibuprofen (MOTRIN) 600 mg tablet Take 600 mg by mouth daily.  red yeast rice extract 600 mg cap Take 600 mg by mouth two (2) times a day. No current facility-administered medications on file prior to visit. Allergies   Allergen Reactions    Statins-Hmg-Coa Reductase Inhibitors Myalgia     Lots of different statins were tried     Past Medical History:   Diagnosis Date    Arthritis     Gastrointestinal disorder     gerd    GERD (gastroesophageal reflux disease)     Ill-defined condition     cholesterol     Past Surgical History:   Procedure Laterality Date    HX BREAST BIOPSY  10-15 yrs ago    neg path, unsure which breast    HX COLONOSCOPY  01/01/2012    approx date, normal    HX ENDOSCOPY      no ulcers    HX ORTHOPAEDIC      right shoulder    HX WISDOM TEETH EXTRACTION       Family History   Problem Relation Age of Onset    Hypertension Mother     Kidney Disease Mother      dialysis    No Known Problems Father     Cancer Brother      tongue, lung     Social History   Substance Use Topics    Smoking status: Never Smoker    Smokeless tobacco: Never Used    Alcohol use Yes      Comment: mixed drink or wine 3-4x per week      Social History     Social History Narrative    Has 4 children and 4 grandchildren     has dementia    Plays pickleball for exercise          Review of Systems  Card: denies chest pain  Pulm: denies shortness of breath  GI: denies hematochezia or dysphagia          Objective:     Vitals:    10/17/17 1426   BP: 118/66   Pulse: 65   Resp: 16   Temp: 97.9 °F (36.6 °C)   TempSrc: Oral   SpO2: 97%   Weight: 155 lb (70.3 kg)   Height: 5' 2\" (1.575 m)     Body mass index is 28.35 kg/(m^2).      General: stated age, well developed, well nourished and in NAD  Neck: supple, symmetrical, trachea midline, no adenopathy and thyroid: not enlarged, symmetric, no tenderness/mass/nodules  Lungs:  clear to auscultation w/o rales, rhonchi, wheezes w/normal effort and no use of accessory muscles of respiration   Heart: regular rate and rhythm, S1, S2 normal, no murmur, click, rub or gallop  Abdomen: soft, nontender, no masses  Ext:  No edema noted. Lymph: no cervical adenopathy appreciated  Skin:  Normal. and no rash or abnormalities. Hair not visibly thinned or coming out with pull test   Psych: alert and oriented to person, place, time and situation and Speech: appropriate quality, quantity and organization of sentences      Lab Results   Component Value Date/Time    Cholesterol, total 222 12/09/2016 04:49 PM    HDL Cholesterol 65 12/09/2016 04:49 PM    LDL, calculated 113 12/09/2016 04:49 PM    VLDL, calculated 44 12/09/2016 04:49 PM    Triglyceride 220 12/09/2016 04:49 PM     Lab Results   Component Value Date/Time    Sodium 143 12/09/2016 04:49 PM    Potassium 5.3 12/09/2016 04:49 PM    Chloride 104 12/09/2016 04:49 PM    CO2 27 12/09/2016 04:49 PM    Anion gap 7 10/31/2016 01:23 PM    Glucose 94 12/09/2016 04:49 PM    BUN 18 12/09/2016 04:49 PM    Creatinine 1.02 12/09/2016 04:49 PM    BUN/Creatinine ratio 18 12/09/2016 04:49 PM    GFR est AA 60 12/09/2016 04:49 PM    GFR est non-AA 52 12/09/2016 04:49 PM    Calcium 9.6 12/09/2016 04:49 PM    Bilirubin, total 0.3 12/09/2016 04:49 PM    AST (SGOT) 18 12/09/2016 04:49 PM    Alk. phosphatase 79 12/09/2016 04:49 PM    Protein, total 6.2 12/09/2016 04:49 PM    Albumin 4.3 12/09/2016 04:49 PM    A-G Ratio 2.3 12/09/2016 04:49 PM    ALT (SGPT) 17 12/09/2016 04:49 PM     Lab Results   Component Value Date/Time    WBC 6.6 12/09/2016 04:49 PM    HGB 13.8 12/09/2016 04:49 PM    HCT 40.9 12/09/2016 04:49 PM    PLATELET 433 28/80/9348 04:49 PM    MCV 89 12/09/2016 04:49 PM     Lab Results   Component Value Date/Time    TSH 5.070 12/09/2016 04:49 PM         Assessment/Plan:       ICD-10-CM ICD-9-CM    1.  Gastroesophageal reflux disease without esophagitis K21.9 530.81 CBC WITH AUTOMATED DIFF   2. 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 Hair loss L65.9 704.00    4. Elevated TSH R94.6 794.5 T4, FREE      TSH 3RD GENERATION   5. Pure hypercholesterolemia E78.00 272.0 LIPID PANEL      METABOLIC PANEL, COMPREHENSIVE   6. Chronic bilateral low back pain without sciatica M54.5 724.2     G89.29 338.29    7. Caregiver burden Z63.6 V61.49    8. Vaginal pain R10.2 625.9    9. Encounter for immunization Z23 V03.89 INFLUENZA VIRUS VACCINE, HIGH DOSE SEASONAL, PRESERVATIVE FREE       Orders Placed This Encounter    Influenza virus vaccine ([address]) 72 years and older ([address]17)    LIPID PANEL    METABOLIC PANEL, COMPREHENSIVE    CBC WITH AUTOMATED DIFF    T4, FREE    TSH 3RD GENERATION     Will attempt to wean prilosec, see pt instructions  Advised that benefits outweigh risks if she has symptoms more than 2x per week off it  Hair loss may be due to stress (telogen effluvium after 's hospitalization), age, or possibly thyroid. Will check thyroid, which has been borderline in the past.  Encouraged to avoid the motrin but at least is not taking it often  The vaginal pain is very infrequent, she will come in for vaginal exam if getting worse or more frequent  Will try to get her set up for reclast infusions. Can't have oral bisphosphonate due to GERD. Seems to be coping very well with caregiving; local resources for dementia caregivers given    Discussed the diagnosis and plan and she expressed understanding. Follow-up Disposition:  Return in about 6 months (around 4/17/2018) for Annual Wellness Visit.     Royce Rutherford MD

## 2017-10-18 ENCOUNTER — DOCUMENTATION ONLY (OUTPATIENT)
Dept: FAMILY MEDICINE CLINIC | Age: 82
End: 2017-10-18

## 2017-10-18 LAB
ALBUMIN SERPL-MCNC: 4.3 G/DL (ref 3.5–4.7)
ALBUMIN/GLOB SERPL: 1.9 {RATIO} (ref 1.2–2.2)
ALP SERPL-CCNC: 89 IU/L (ref 39–117)
ALT SERPL-CCNC: 17 IU/L (ref 0–32)
AST SERPL-CCNC: 18 IU/L (ref 0–40)
BASOPHILS # BLD AUTO: 0 X10E3/UL (ref 0–0.2)
BASOPHILS NFR BLD AUTO: 0 %
BILIRUB SERPL-MCNC: 0.5 MG/DL (ref 0–1.2)
BUN SERPL-MCNC: 15 MG/DL (ref 8–27)
BUN/CREAT SERPL: 18 (ref 12–28)
CALCIUM SERPL-MCNC: 9.6 MG/DL (ref 8.7–10.3)
CHLORIDE SERPL-SCNC: 100 MMOL/L (ref 96–106)
CHOLEST SERPL-MCNC: 276 MG/DL (ref 100–199)
CO2 SERPL-SCNC: 26 MMOL/L (ref 18–29)
CREAT SERPL-MCNC: 0.84 MG/DL (ref 0.57–1)
EOSINOPHIL # BLD AUTO: 0.1 X10E3/UL (ref 0–0.4)
EOSINOPHIL NFR BLD AUTO: 2 %
ERYTHROCYTE [DISTWIDTH] IN BLOOD BY AUTOMATED COUNT: 14.1 % (ref 12.3–15.4)
GLOBULIN SER CALC-MCNC: 2.3 G/DL (ref 1.5–4.5)
GLUCOSE SERPL-MCNC: 100 MG/DL (ref 65–99)
HCT VFR BLD AUTO: 41.6 % (ref 34–46.6)
HDLC SERPL-MCNC: 64 MG/DL
HGB BLD-MCNC: 13.7 G/DL (ref 11.1–15.9)
IMM GRANULOCYTES # BLD: 0 X10E3/UL (ref 0–0.1)
IMM GRANULOCYTES NFR BLD: 0 %
INTERPRETATION, 910389: NORMAL
LDLC SERPL CALC-MCNC: 178 MG/DL (ref 0–99)
LYMPHOCYTES # BLD AUTO: 1.9 X10E3/UL (ref 0.7–3.1)
LYMPHOCYTES NFR BLD AUTO: 24 %
MCH RBC QN AUTO: 29.5 PG (ref 26.6–33)
MCHC RBC AUTO-ENTMCNC: 32.9 G/DL (ref 31.5–35.7)
MCV RBC AUTO: 90 FL (ref 79–97)
MONOCYTES # BLD AUTO: 0.6 X10E3/UL (ref 0.1–0.9)
MONOCYTES NFR BLD AUTO: 8 %
NEUTROPHILS # BLD AUTO: 5.3 X10E3/UL (ref 1.4–7)
NEUTROPHILS NFR BLD AUTO: 66 %
PLATELET # BLD AUTO: 302 X10E3/UL (ref 150–379)
POTASSIUM SERPL-SCNC: 4.7 MMOL/L (ref 3.5–5.2)
PROT SERPL-MCNC: 6.6 G/DL (ref 6–8.5)
RBC # BLD AUTO: 4.64 X10E6/UL (ref 3.77–5.28)
SODIUM SERPL-SCNC: 140 MMOL/L (ref 134–144)
T4 FREE SERPL-MCNC: 1.13 NG/DL (ref 0.82–1.77)
TRIGL SERPL-MCNC: 172 MG/DL (ref 0–149)
TSH SERPL DL<=0.005 MIU/L-ACNC: 5.5 UIU/ML (ref 0.45–4.5)
VLDLC SERPL CALC-MCNC: 34 MG/DL (ref 5–40)
WBC # BLD AUTO: 7.9 X10E3/UL (ref 3.4–10.8)

## 2017-10-19 NOTE — PROGRESS NOTES
Sent in letter:  Thyroid tests were ok (but the TSH being slightly off means we need to monitor this yearly). Kidney, liver, electrolytes, sugar, and blood counts were good. Cholesterol is a bit worse, but I know you have not been able to tolerate numerous medications. I really don't think it is worth trying another, although I would encourage you to stay on the red yeast rice. Also, I would encourage you to eat plenty of plant foods (fruits, veggies, beans, whole grains), including plant fats (nuts, avocados, olive oil). You should try to avoid red meats, hard cheeses, butter, ice cream and other fatty dairy, as well as avoiding junk foods like chips, crackers, and cookies. Getting plenty of exercise also helps!

## 2017-10-26 RX ORDER — ZOLEDRONIC ACID 5 MG/100ML
5 INJECTION, SOLUTION INTRAVENOUS ONCE
Status: COMPLETED | OUTPATIENT
Start: 2017-10-31 | End: 2017-10-31

## 2017-10-31 ENCOUNTER — HOSPITAL ENCOUNTER (OUTPATIENT)
Dept: INFUSION THERAPY | Age: 82
Discharge: HOME | End: 2017-10-31
Payer: MEDICARE

## 2017-10-31 VITALS
SYSTOLIC BLOOD PRESSURE: 106 MMHG | WEIGHT: 153.9 LBS | TEMPERATURE: 97.6 F | DIASTOLIC BLOOD PRESSURE: 68 MMHG | RESPIRATION RATE: 16 BRPM | BODY MASS INDEX: 28.15 KG/M2 | HEART RATE: 66 BPM

## 2017-10-31 PROCEDURE — 74011250636 HC RX REV CODE- 250/636: Performed by: FAMILY MEDICINE

## 2017-10-31 PROCEDURE — 96365 THER/PROPH/DIAG IV INF INIT: CPT

## 2017-10-31 RX ADMIN — ZOLEDRONIC ACID 5 MG: 5 INJECTION, SOLUTION INTRAVENOUS at 15:53

## 2017-10-31 NOTE — PROGRESS NOTES
OUTPATIENT INFUSION CENTER    DISCHARGE INSTRUCTIONS FOR:  RECLAST (ZOLEDRONIC ACID):    1.  Be sure to inform your Dentist that you are taking this drug prior to invasive dental procedures. You should avoid major dental work for a while after you are treated with this medicine. Report any signs/symptoms of jaw pain to your physician immediately. 2.  Your doctor may order lab testing before each yearly dose of Reclast to check your calcium and kidney function. Your doctor may also prescribe Vitamin D and Calcium supplements. 3.  Make sure your doctor knows if you are taking fluid pills, arthritis medicines or antibiotics,  or if you have a history of problems with your gums, mouth or teeth. 4.  Drink some extra fluids during the 12-hour period before and after you receive Reclast.  Report any changes in urinary patterns (example: a decrease in how often you urinate). Also report rapid weight gain, swelling of the hands, ankles or feet, unusual tiredness or weakness or unusual bleeding or bruising. 5.  You may resume your normal activities after your infusion. Some people may have mild flu-like side effects from Reclast, especially with the first dose. These side effects are less common with subsequent doses. These may include:    Mild nausea, diarrhea, constipation or upset stomach; Red or irritated eyes; redness or itching at IV site;  Mild skin rash, mild numbness, tingling, or burning in extremities; Headache, dizziness, trouble sleeping, or mild muscle or joint pain, which can be relieved with a mild pain reliever such as Tyenol or Ibuprofen as directed by your physician;  Nasal congestion, runny nose, sneezing. 6.  Signs/Symptoms of an allergic reaction may require immediate medical attention. These are rare, but may include one or more of the following:     Skin - Swelling, blistering, weeping, crusting, rash, itching or;   Wheezing, cough, sore throat, chest tightness, chest pain or shortness of breath, irregular heart beat;  Swelling of the face, eyelids, lips, tongue, or throat; severe dry mouth; Severe red (bloodshot), itchy, swollen, watery or painful eyes;  Stomach pain, loss of appetite, nausea, vomiting, or bloody diarrhea;  Severe headache, sleepiness or changes in personality;  Numbness, tingling or pain around the mouth, teeth, or jaw. Contact your physician if you have questions or concerns, or experience any of the above symptoms.     David Carranza, Signature: ______________________________ 10/31/2017  Shivani Lopez

## 2017-10-31 NOTE — PROGRESS NOTES
Outpatient Infusion Center Short Visit Progress Note    9464 Pt admit to [facility] for Reclast ambulatory in stable condition. Assessment completed. No new concerns voiced. Visit Vitals    /68    Pulse 66    Temp 97.6 °F (36.4 °C)    Resp 16    Wt 69.8 kg (153 lb 14.4 oz)    Breastfeeding No    BMI 28.15 kg/m2     R AC IV with positive blood return. Medications:  Reclast IV    1640 Pt tolerated treatment well. Stayed 30 min post infusion no reaction noted. Discharge instructions given and handout on reclast. D/c home ambulatory in no distress.  Pt aware of next appointment scheduled to see MD. Cyrus Sosa, RN

## 2017-11-14 ENCOUNTER — TELEPHONE (OUTPATIENT)
Dept: FAMILY MEDICINE CLINIC | Age: 82
End: 2017-11-14

## 2017-11-28 ENCOUNTER — OFFICE VISIT (OUTPATIENT)
Dept: FAMILY MEDICINE CLINIC | Age: 82
End: 2017-11-28

## 2017-11-28 VITALS
WEIGHT: 157.8 LBS | HEART RATE: 66 BPM | RESPIRATION RATE: 16 BRPM | HEIGHT: 62 IN | TEMPERATURE: 97.9 F | OXYGEN SATURATION: 97 % | DIASTOLIC BLOOD PRESSURE: 64 MMHG | SYSTOLIC BLOOD PRESSURE: 122 MMHG | BODY MASS INDEX: 29.04 KG/M2

## 2017-11-28 DIAGNOSIS — R51.9 ACUTE NONINTRACTABLE HEADACHE, UNSPECIFIED HEADACHE TYPE: Primary | ICD-10-CM

## 2017-11-28 DIAGNOSIS — M81.0 OSTEOPOROSIS OF FOREARM WITHOUT PATHOLOGICAL FRACTURE: ICD-10-CM

## 2017-11-28 DIAGNOSIS — L98.9 SKIN LESION OF RIGHT LEG: ICD-10-CM

## 2017-11-28 DIAGNOSIS — M25.562 ACUTE PAIN OF LEFT KNEE: ICD-10-CM

## 2017-11-28 RX ORDER — IBUPROFEN 200 MG
CAPSULE ORAL
COMMUNITY
Start: 2017-11-28 | End: 2022-01-29 | Stop reason: DRUGHIGH

## 2017-11-28 RX ORDER — ACYCLOVIR 400 MG/1
400 TABLET ORAL 2 TIMES DAILY
Qty: 180 TAB | Refills: 3 | Status: SHIPPED | OUTPATIENT
Start: 2017-11-28 | End: 2018-06-21 | Stop reason: SDUPTHER

## 2017-11-28 NOTE — PATIENT INSTRUCTIONS
Knee: Exercises  Your Care Instructions  Here are some examples of exercises for your knee. Start each exercise slowly. Ease off the exercise if you start to have pain. Your doctor or physical therapist will tell you when you can start these exercises and which ones will work best for you. How to do the exercises  Quad sets    1. Sit with your leg straight and supported on the floor or a firm bed. (If you feel discomfort in the front or back of your knee, place a small towel roll under your knee.)  2. Tighten the muscles on top of your thigh by pressing the back of your knee flat down to the floor. (If you feel discomfort under your kneecap, place a small towel roll under your knee.)  3. Hold for about 6 seconds, then rest for up to 10 seconds. 4. Do 8 to 12 repetitions several times a day. Straight-leg raises to the front    1. Lie on your back with your good knee bent so that your foot rests flat on the floor. Your injured leg should be straight. Make sure that your low back has a normal curve. You should be able to slip your flat hand in between the floor and the small of your back, with your palm touching the floor and your back touching the back of your hand. 2. Tighten the thigh muscles in the injured leg by pressing the back of your knee flat down to the floor. Hold your knee straight. 3. Keeping the thigh muscles tight, lift your injured leg up so that your heel is about 12 inches off the floor. Hold for about 6 seconds and then lower slowly. 4. Do 8 to 12 repetitions, 3 times a day. Straight-leg raises to the outside    1. Lie on your side, with your injured leg on top. 2. Tighten the front thigh muscles of your injured leg to keep your knee straight. 3. Keep your hip and your leg straight in line with the rest of your body, and keep your knee pointing forward. Do not drop your hip back. 4. Lift your injured leg straight up toward the ceiling, about 12 inches off the floor.  Hold for about 6 seconds, then slowly lower your leg. 5. Do 8 to 12 repetitions. Straight-leg raises to the back    1. Lie on your stomach, and lift your leg straight up behind you (toward the ceiling). 2. Lift your toes about 6 inches off the floor, hold for about 6 seconds, then lower slowly. 3. Do 8 to 12 repetitions. Straight-leg raises to the inside    1. Lie on the side of your body with the injured leg. 2. You can either prop your other (good) leg up on a chair, or you can bend your good knee and put that foot in front of your injured knee. Do not drop your hip back. 3. Tighten the muscles on the front of your thigh to straighten your injured knee. 4. Keep your kneecap pointing forward, and lift your whole leg up toward the ceiling about 6 inches. Hold for about 6 seconds, then lower slowly. 5. Do 8 to 12 repetitions. Heel dig bridging    1. Lie on your back with both knees bent and your ankles bent so that only your heels are digging into the floor. Your knees should be bent about 90 degrees. 2. Then push your heels into the floor, squeeze your buttocks, and lift your hips off the floor until your shoulders, hips, and knees are all in a straight line. 3. Hold for about 6 seconds as you continue to breathe normally, and then slowly lower your hips back down to the floor and rest for up to 10 seconds. 4. Do 8 to 12 repetitions. Hamstring curls    1. Lie on your stomach with your knees straight. If your kneecap is uncomfortable, roll up a washcloth and put it under your leg just above your kneecap. 2. Lift the foot of your injured leg by bending the knee so that you bring the foot up toward your buttock. If this motion hurts, try it without bending your knee quite as far. This may help you avoid any painful motion. 3. Slowly lower your leg back to the floor. 4. Do 8 to 12 repetitions.   5. With permission from your doctor or physical therapist, you may also want to add a cuff weight to your ankle (not more than 5 pounds). With weight, you do not have to lift your leg more than 12 inches to get a hamstring workout. Shallow standing knee bends    Do this exercise only if you have very little pain; if you have no clicking, locking, or giving way if you have an injured knee; and if it does not hurt while you are doing 8 to 12 repetitions. 1. Stand with your hands lightly resting on a counter or chair in front of you. Put your feet shoulder-width apart. 2. Slowly bend your knees so that you squat down like you are going to sit in a chair. Make sure your knees do not go in front of your toes. 3. Lower yourself about 6 inches. Your heels should remain on the floor at all times. 4. Rise slowly to a standing position. Heel raises    1. Stand with your feet a few inches apart, with your hands lightly resting on a counter or chair in front of you. 2. Slowly raise your heels off the floor while keeping your knees straight. 3. Hold for about 6 seconds, then slowly lower your heels to the floor. 4. Do 8 to 12 repetitions several times during the day. Follow-up care is a key part of your treatment and safety. Be sure to make and go to all appointments, and call your doctor if you are having problems. It's also a good idea to know your test results and keep a list of the medicines you take. Where can you learn more? Go to http://anne-brian.info/. Enter X547 in the search box to learn more about \"Knee: Exercises. \"  Current as of: March 21, 2017  Content Version: 11.4  © 1094-7942 [employer]wise, Incorporated. Care instructions adapted under license by [employer] (which disclaims liability or warranty for this information). If you have questions about a medical condition or this instruction, always ask your healthcare professional. [address] any warranty or liability for your use of this information.

## 2017-11-28 NOTE — PROGRESS NOTES
Jesús Christie [address] [facility]  [address] [place], [address]  [phone]             Date of visit: 11/28/17   Subjective:      History obtained from:  the patient. Lazaro Shaffer is a 80 y.o. female who presents today for problems with reclast.  Headache started soon after reclast which was 10/31  That gradually improved,   Then had pressure in head  Finally better in past few days    Left knee just beginning since the reclast, is on and off  Has not felt it the past few days  On and off hurts when she twists it wrong, like when playing pickle ball  Few minutes later will feel better  Worried she might need a knee replacement because so many of her friends do  Taking ibuprofen    Skin lesion right lower leg  Going to see derm in July but could go sooner if needed    Patient Active Problem List    Diagnosis Date Noted    Caregiver burden 04/19/2017    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 To prevent cold sores 180 Tab 3    multivitamin (ONE A DAY) tablet Take 1 Tab by mouth daily. Takes up to 2 tabs on the 200 mg      omeprazole (PRILOSEC) 20 mg capsule Take 1 Cap by mouth daily. 90 Cap 3    DOCOSAHEXANOIC ACID/EPA (FISH OIL PO) Take  by mouth. 2 tablet a day      ibuprofen (MOTRIN) 600 mg tablet Take 600 mg by mouth daily.  red yeast rice extract 600 mg cap Take 600 mg by mouth two (2) times a day.        Allergies   Allergen Reactions    Statins-Hmg-Coa Reductase Inhibitors Myalgia     Lots of different statins were tried     Past Medical History:   Diagnosis Date    Arthritis     Gastrointestinal disorder     gerd    GERD (gastroesophageal reflux disease)     Ill-defined condition     cholesterol     Past Surgical History:   Procedure Laterality Date    HX BREAST BIOPSY  10-15 yrs ago    neg path, unsure which breast    HX COLONOSCOPY  01/01/2012    approx date, normal    HX ENDOSCOPY      no ulcers    HX ORTHOPAEDIC      right shoulder    HX WISDOM TEETH EXTRACTION       Family History   Problem Relation Age of Onset    Hypertension Mother     Kidney Disease Mother      dialysis    No Known Problems Father     Cancer Brother      tongue, lung     Social History   Substance Use Topics    Smoking status: Never Smoker    Smokeless tobacco: Never Used    Alcohol use Yes      Comment: mixed drink or wine 3-4x per week      Social History     Social History Narrative    Has 4 children and 4 grandchildren     has dementia    Plays pickleball for exercise        Review of Systems  Gen: denies fever  ENT denies congestion        Objective:     Vitals:    11/28/17 1009   BP: 122/64   Pulse: 66   Resp: 16   Temp: 97.9 °F (36.6 °C)   TempSrc: Oral   SpO2: 97%   Weight: 157 lb 12.8 oz (71.6 kg)   Height: 5' 2\" (1.575 m)     Body mass index is 28.86 kg/(m^2). General: stated age, well developed, well nourished and in NAD  Neck: supple, symmetrical, trachea midline, no adenopathy and thyroid: not enlarged, symmetric, no tenderness/mass/nodules  Lungs:  clear to auscultation w/o rales, rhonchi, wheezes w/normal effort and no use of accessory muscles of respiration   Heart: regular rate and rhythm, S1, S2 normal, no murmur, click, rub or gallop  Abdomen: soft, nontender, no masses  Ext:  No edema noted.    Lymph: no cervical adenopathy appreciated  Skin:  Legs with numerous lightly hyperpigmented macules consistent with solar lentigo, right lateral mid-leg with erythematous scaly approx 8mm papule  Psych: alert and oriented to person, place, time and situation and Speech: appropriate quality, quantity and organization of sentences  Neuro: CN 2-12 intact, strength 5/5, patellar reflexes 2+ bilaterally, sensation intact to light touch bilaterally, gait normal  MSK: left knee no deformity or swelling, normal ROM, mildly tender at jointline medially and more so laterally, no instability, neg MacMurrays     Assessment/Plan:       ICD-10-CM ICD-9-CM    1. Acute nonintractable headache, unspecified headache type R51 784.0    2. Acute pain of left knee M25.562 719.46    3. 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 Skin lesion of right leg L98.9 709.9         Orders Placed This Encounter    Calcium-Cholecalciferol, D3, 500 mg(1,250mg) -125 unit tab    acyclovir (ZOVIRAX) 400 mg tablet       HA and pressure most likely was prolonged side effect of Reclast but is better  If returns may consider basic labs, possibly even imaging  Discussed that her osteoporosis is mild and that she gets a lot of benefit even from 1 reclast infusion. She would like to defer repeating it as the side effects were so bad. Told her if we did repeat it side effects likely not as bad the 2nd time. At this point we plan to recheck her dexa in 2-3 years and go from there, perhaps trying a different medication if she really needs one. The knee pain is intermittent and doesn't seem to be serious. Has not been hurting past few days so we will defer xray, she will call if wanting one later. Recommended she see derm in next 2-3 months for the skin lesion instead of waiting until July, could be early [facility]    Discussed the diagnosis and plan and she expressed understanding. Follow-up Disposition:  Return in about 5 months (around 4/28/2018) for Annual Wellness Visit.     Neva Sapp MD

## 2017-11-28 NOTE — PROGRESS NOTES
Pt presents to office today for a follow up on Osteoporosis. Pt reports that she experiencing some concerns after getting her Reclast injection. Pt reports that she was getting headaches, especially with pressure and left knee pain. She does admit that this may not associated with her injections, but this worth mentioning   Chief Complaint   Patient presents with    Osteoporosis     Follow up    Knee Pain     Left knee for a couple of weeks after getting Reclast shot. 1. Have you been to the ER, urgent care clinic since your last visit? Hospitalized since your last visit? No    2. Have you seen or consulted any other health care providers outside of the [address] [place] [address] since your last visit? Include any pap smears or colon screening.  No

## 2017-11-28 NOTE — MR AVS SNAPSHOT
Visit Information Date & Time Provider Department Dept. Phone Encounter #  
 11/28/2017  9:45 AM Elvia Juarez, [address] [phone] [id number] Follow-up Instructions Return in about 5 months (around 4/28/2018) for Annual Wellness Visit. Upcoming Health Maintenance Date Due ZOSTER VACCINE AGE 60> 9/19/1995 GLAUCOMA SCREENING Q2Y 11/19/2000 Pneumococcal 65+ Low/Medium Risk (1 of 2 - PCV13) 11/19/2000 MEDICARE YEARLY EXAM 4/19/2018 DTaP/Tdap/Td series (2 - Td) 7/30/2025 Allergies as of 11/28/2017  Review Complete On: 11/28/2017 By: Elvia Juarez MD  
  
 Severity Noted Reaction Type Reactions Statins-hmg-coa Reductase Inhibitors  12/09/2016   Intolerance Myalgia Lots of different statins were tried Current Immunizations  Reviewed on 11/27/2017 Name Date Influenza High Dose Vaccine PF 10/17/2017 Tdap 7/30/2015 Not reviewed this visit You Were Diagnosed With   
  
 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  
 28.86 kg/m 2 1.77 m 2 Preferred Pharmacy Pharmacy Name Phone [address] [facility] [phone] Your Updated Medication List  
  
   
 This list is accurate as of: 11/28/17 10:55 AM.  Always use your most recent med list.  
  
  
  
  
 acyclovir 400 mg tablet Commonly known as:  ZOVIRAX Take 1 Tab by mouth two (2) times a day. To prevent cold sores Calcium-Cholecalciferol (D3) 500 mg(1,250mg) -125 unit Tab 1 per day FISH OIL PO Take  by mouth. 2 tablet a day  
  
 ibuprofen 600 mg tablet Commonly known as:  MOTRIN Take 600 mg by mouth daily. multivitamin tablet Commonly known as:  ONE A DAY Take 1 Tab by mouth daily. Takes up to 2 tabs on the 200 mg  
  
 omeprazole 20 mg capsule Commonly known as:  PRILOSEC Take 1 Cap by mouth daily. red yeast rice extract 600 mg Cap Take 600 mg by mouth two (2) times a day. Follow-up Instructions Return in about 5 months (around 4/28/2018) for Annual Wellness Visit. Patient Instructions Knee: Exercises Your Care Instructions Here are some examples of exercises for your knee. Start each exercise slowly. Ease off the exercise if you start to have pain. Your doctor or physical therapist will tell you when you can start these exercises and which ones will work best for you. How to do the exercises [facility] 1. Sit with your leg straight and supported on the floor or a firm bed. (If you feel discomfort in the front or back of your knee, place a small towel roll under your knee.) 2. Tighten the muscles on top of your thigh by pressing the back of your knee flat down to the floor. (If you feel discomfort under your kneecap, place a small towel roll under your knee.) 3. Hold for about 6 seconds, then rest for up to 10 seconds. 4. Do 8 to 12 repetitions several times a day. Straight-leg raises to the front 1. Lie on your back with your good knee bent so that your foot rests flat on the floor. Your injured leg should be straight. Make sure that your low back has a normal curve.  You should be able to slip your flat hand in between the floor and the small of your back, with your palm touching the floor and your back touching the back of your hand. 2. Tighten the thigh muscles in the injured leg by pressing the back of your knee flat down to the floor. Hold your knee straight. 3. Keeping the thigh muscles tight, lift your injured leg up so that your heel is about 12 inches off the floor. Hold for about 6 seconds and then lower slowly. 4. Do 8 to 12 repetitions, 3 times a day. Straight-leg raises to the outside 1. Lie on your side, with your injured leg on top. 2. Tighten the front thigh muscles of your injured leg to keep your knee straight. 3. Keep your hip and your leg straight in line with the rest of your body, and keep your knee pointing forward. Do not drop your hip back. 4. Lift your injured leg straight up toward the ceiling, about 12 inches off the floor. Hold for about 6 seconds, then slowly lower your leg. 5. Do 8 to 12 repetitions. Straight-leg raises to the back 1. Lie on your stomach, and lift your leg straight up behind you (toward the ceiling). 2. Lift your toes about 6 inches off the floor, hold for about 6 seconds, then lower slowly. 3. Do 8 to 12 repetitions. Straight-leg raises to the inside 1. Lie on the side of your body with the injured leg. 2. You can either prop your other (good) leg up on a chair, or you can bend your good knee and put that foot in front of your injured knee. Do not drop your hip back. 3. Tighten the muscles on the front of your thigh to straighten your injured knee. 4. Keep your kneecap pointing forward, and lift your whole leg up toward the ceiling about 6 inches. Hold for about 6 seconds, then lower slowly. 5. Do 8 to 12 repetitions. Heel dig bridging 1. Lie on your back with both knees bent and your ankles bent so that only your heels are digging into the floor. Your knees should be bent about 90 degrees. 2. Then push your heels into the floor, squeeze your buttocks, and lift your hips off the floor until your shoulders, hips, and knees are all in a straight line. 3. Hold for about 6 seconds as you continue to breathe normally, and then slowly lower your hips back down to the floor and rest for up to 10 seconds. 4. Do 8 to 12 repetitions. Hamstring curls 1. Lie on your stomach with your knees straight. If your kneecap is uncomfortable, roll up a washcloth and put it under your leg just above your kneecap. 2. Lift the foot of your injured leg by bending the knee so that you bring the foot up toward your buttock. If this motion hurts, try it without bending your knee quite as far. This may help you avoid any painful motion. 3. Slowly lower your leg back to the floor. 4. Do 8 to 12 repetitions. 5. With permission from your doctor or physical therapist, you may also want to add a cuff weight to your ankle (not more than 5 pounds). With weight, you do not have to lift your leg more than 12 inches to get a hamstring workout. Shallow standing knee bends Do this exercise only if you have very little pain; if you have no clicking, locking, or giving way if you have an injured knee; and if it does not hurt while you are doing 8 to 12 repetitions. 1. Stand with your hands lightly resting on a counter or chair in front of you. Put your feet shoulder-width apart. 2. Slowly bend your knees so that you squat down like you are going to sit in a chair. Make sure your knees do not go in front of your toes. 3. Lower yourself about 6 inches. Your heels should remain on the floor at all times. 4. Rise slowly to a standing position. Heel raises 1. Stand with your feet a few inches apart, with your hands lightly resting on a counter or chair in front of you. 2. Slowly raise your heels off the floor while keeping your knees straight. 3. Hold for about 6 seconds, then slowly lower your heels to the floor. 4. Do 8 to 12 repetitions several times during the day. Follow-up care is a key part of your treatment and safety. Be sure to make and go to all appointments, and call your doctor if you are having problems. It's also a good idea to know your test results and keep a list of the medicines you take. Where can you learn more? Go to http://anne-brian.info/. Enter M641 in the search box to learn more about \"Knee: Exercises. \" Current as of: March 21, 2017 Content Version: 11.4 © 8679-5310 "[employer]. Care instructions adapted under license by [employer] (which disclaims liability or warranty for this information). If you have questions about a medical condition or this instruction, always ask your healthcare professional. [address] any warranty or liability for your use of this information. Introducing [facility]! Jayna Fletcher introduces Buzzoek patient portal. Now you can access parts of your medical record, email your doctor's office, and request medication refills online. 1. In your internet browser, go to [email or web address] 2. Click on the First Time User? Click Here link in the Sign In box. You will see the New Member Sign Up page. 3. Enter your Buzzoek Access Code exactly as it appears below. You will not need to use this code after youve completed the sign-up process. If you do not sign up before the expiration date, you must request a new code. · Buzzoek Access Code: [email or web address] Expires: 1/15/2018  1:32 PM 
 
4. Enter the last four digits of your Social Security Number (xxxx) and Date of Birth (mm/dd/yyyy) as indicated and click Submit. You will be taken to the next sign-up page. 5. Create a Amadixt ID. This will be your Buzzoek login ID and cannot be changed, so think of one that is secure and easy to remember. 6. Create a Amadixt password. You can change your password at any time. 7. Enter your Password Reset Question and Answer. This can be used at a later time if you forget your password. 8. Enter your e-mail address. You will receive e-mail notification when new information is available in [address] 9. Click Sign Up. You can now view and download portions of your medical record. 10. Click the Download Summary menu link to download a portable copy of your medical information. If you have questions, please visit the Frequently Asked Questions section of the get2play website. Remember, get2play is NOT to be used for urgent needs. For medical emergencies, dial 911. Now available from your iPhone and Android! Please provide this summary of care documentation to your next provider. Your primary care clinician is listed as Juju Rutledge. If you have any questions after today's visit, please call [phone].

## 2018-01-12 ENCOUNTER — TELEPHONE (OUTPATIENT)
Dept: FAMILY MEDICINE CLINIC | Age: 83
End: 2018-01-12

## 2018-01-12 NOTE — TELEPHONE ENCOUNTER
Called pt back she wanted to get some names of ortho's who does knee replacements. I gave her Dr Mabel Montgomery and Dr Diana Mueller.

## 2018-01-12 NOTE — TELEPHONE ENCOUNTER
----- Message from Ecwid Jointer Page sent at 1/12/2018  3:19 PM EST -----  Regarding: Dr. Radha Guallpa  Pt is requesting a return call, regarding a reference for a knee doctor. Best contact number is [phone].

## 2018-05-22 ENCOUNTER — HOSPITAL ENCOUNTER (OUTPATIENT)
Dept: MAMMOGRAPHY | Age: 83
Discharge: HOME | End: 2018-05-22
Attending: FAMILY MEDICINE
Payer: MEDICARE

## 2018-05-22 DIAGNOSIS — Z12.31 VISIT FOR SCREENING MAMMOGRAM: ICD-10-CM

## 2018-05-22 PROCEDURE — 77067 SCR MAMMO BI INCL CAD: CPT

## 2018-06-21 ENCOUNTER — OFFICE VISIT (OUTPATIENT)
Dept: FAMILY MEDICINE CLINIC | Age: 83
End: 2018-06-21

## 2018-06-21 VITALS
DIASTOLIC BLOOD PRESSURE: 78 MMHG | SYSTOLIC BLOOD PRESSURE: 130 MMHG | TEMPERATURE: 98 F | WEIGHT: 154 LBS | HEIGHT: 62 IN | OXYGEN SATURATION: 97 % | RESPIRATION RATE: 16 BRPM | BODY MASS INDEX: 28.34 KG/M2 | HEART RATE: 63 BPM

## 2018-06-21 DIAGNOSIS — Z00.00 MEDICARE ANNUAL WELLNESS VISIT, SUBSEQUENT: Primary | ICD-10-CM

## 2018-06-21 DIAGNOSIS — M54.50 CHRONIC BILATERAL LOW BACK PAIN WITHOUT SCIATICA: ICD-10-CM

## 2018-06-21 DIAGNOSIS — Z63.6 CAREGIVER BURDEN: ICD-10-CM

## 2018-06-21 DIAGNOSIS — E78.00 PURE HYPERCHOLESTEROLEMIA: ICD-10-CM

## 2018-06-21 DIAGNOSIS — M25.512 ACUTE PAIN OF LEFT SHOULDER: ICD-10-CM

## 2018-06-21 DIAGNOSIS — M81.0 OSTEOPOROSIS OF FOREARM WITHOUT PATHOLOGICAL FRACTURE: ICD-10-CM

## 2018-06-21 DIAGNOSIS — M25.551 RIGHT HIP PAIN: ICD-10-CM

## 2018-06-21 DIAGNOSIS — E03.8 SUBCLINICAL HYPOTHYROIDISM: ICD-10-CM

## 2018-06-21 DIAGNOSIS — K21.9 GASTROESOPHAGEAL REFLUX DISEASE WITHOUT ESOPHAGITIS: ICD-10-CM

## 2018-06-21 DIAGNOSIS — G89.29 CHRONIC BILATERAL LOW BACK PAIN WITHOUT SCIATICA: ICD-10-CM

## 2018-06-21 RX ORDER — ACYCLOVIR 400 MG/1
400 TABLET ORAL 2 TIMES DAILY
Qty: 180 TAB | Refills: 3 | Status: SHIPPED | OUTPATIENT
Start: 2018-06-21 | End: 2019-06-24 | Stop reason: SDUPTHER

## 2018-06-21 RX ORDER — IBUPROFEN 600 MG/1
600 TABLET ORAL
Qty: 120 TAB | Refills: 1 | Status: SHIPPED | OUTPATIENT
Start: 2018-06-21 | End: 2019-09-07 | Stop reason: SDUPTHER

## 2018-06-21 SDOH — SOCIAL STABILITY - SOCIAL INSECURITY: DEPENDENT RELATIVE NEEDING CARE AT HOME: Z63.6

## 2018-06-21 NOTE — PROGRESS NOTES
Jesús Christie [address] [place] [address] Rebecca. Kirill [address]  [phone]           Date of visit: 6/21/18       This is an Subsequent Medicare Annual Wellness Visit (AWV), (Performed more than 12 months after effective date of Medicare Part B enrollment and 12 months after last preventive visit)    I have reviewed the patient's medical history in detail and updated the computerized patient record. Bertha Wilkerson is a 80 y.o. female   History obtained from: the patient. Concerns today   (Patient understands that medical problems addressed today may incur additional cost as this is a preventive visit)  -originally made appt for right hip pain for 1 month not going away. Front back and side. Worst in back. Going to see ortho Dr. Andrea Mandujano this afternoon  -her back is a big problem, has had a lot of problems for years. Was getting shots for a while but stopped working. Used to do the PT exercises on her own at home but not recently. -left shoulder pain not as bad as right was when she had torn rotator cuff and had surgery but still is bothering her some. Maybe from lifting things in garden but no particular injury. No numbness or tingling. Is having some difficulty using that arm.    -would like rx for ibuprofen as it worked better for her  -uses the reflux pills just occasionally    Needs recheck thyroid labs for subclinical hypothyroid    -wondering if the reclast has affected her teeth. Had to see dentist for tooth pain, right posterior. Was told the tooth holding her bridge is being destroyed by her body and he couldn't do anything about it.  Wondered if osteonecrosis or a complication of the reclast.  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    Pure hypercholesterolemia E78.00    Gastroesophageal reflux disease without esophagitis K21.9    Chronic bilateral low back pain without sciatica M54.5, G89.29    History of thyroid disease Z86.39    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    Caregiver burden Z63.6    Subclinical hypothyroidism E03.9     Past Medical History:   Diagnosis Date    Arthritis     Gastrointestinal disorder     gerd    GERD (gastroesophageal reflux disease)     Ill-defined condition     cholesterol      Past Surgical History:   Procedure Laterality Date    HX BREAST BIOPSY  10-15 yrs ago    neg path, unsure which breast    HX COLONOSCOPY  01/01/2012    approx date, normal    HX ENDOSCOPY      no ulcers    HX ORTHOPAEDIC      right shoulder    HX WISDOM TEETH EXTRACTION       Allergies   Allergen Reactions    Statins-Hmg-Coa Reductase Inhibitors Myalgia     Lots of different statins were tried     Current Outpatient Prescriptions   Medication Sig Dispense Refill    ibuprofen (MOTRIN) 600 mg tablet Take 1 Tab by mouth every six (6) hours as needed for Pain. 120 Tab 1    acyclovir (ZOVIRAX) 400 mg tablet Take 1 Tab by mouth two (2) times a day. To prevent cold sores 180 Tab 3    multivitamin (ONE A DAY) tablet Take 1 Tab by mouth daily. Takes up to 2 tabs on the 200 mg      omeprazole (PRILOSEC) 20 mg capsule Take 1 Cap by mouth daily. 90 Cap 3    DOCOSAHEXANOIC ACID/EPA (FISH OIL PO) Take  by mouth. 2 tablet a day      red yeast rice extract 600 mg cap Take 600 mg by mouth two (2) times a day.       Calcium-Cholecalciferol, D3, 500 mg(1,250mg) -125 unit tab 1 per day       Family History   Problem Relation Age of Onset    Hypertension Mother     Kidney Disease Mother      dialysis    No Known Problems Father     Cancer Brother      tongue, lung     Social History   Substance Use Topics    Smoking status: Never Smoker    Smokeless tobacco: Never Used    Alcohol use Yes      Comment: mixed drink or wine 3-4x per week       Specialists/Care Team   Sandip Starr has established care with the following healthcare providers:  Patient Care Team:  Chapo Leon MD as PCP - General (Family Practice)  Selvin Hardin MD (Dermatology)  Tierra Holding someone is her eye doctor    Health Risk Assessment     Demographics   female  80 y.o. General Health Questions   -During the past 4 weeks:   -how would you rate your health in general? Good   -how often have you been bothered by feeling dizzy when standing up? never   -how much have you been bothered by bodily pain? Moderately, as above   -Have you noticed any hearing difficulties? Yes, has hearing aids   -has your physical and emotional health limited your social activities with family or friends? No, just being caregiver    Emotional Health Questions   -Do you have a history of depression, anxiety, or emotional problems? no  -Over the past 2 weeks, have you felt down, depressed or hopeless? no  -Over the past 2 weeks, have you felt little interest or pleasure in doing things? no    Health Habits   Please describe your diet habits: eats well, water, fruit smoothie with protein powder in the AM, dessert once daily  Do you get 5 servings of fruits or vegetables daily? Yes, or close to it, more fruits than veggies  Do you exercise regularly?  Yes, pickle ball 3x per week    Activities of Daily Living and Functional Status   -Do you need help with eating, walking, dressing, bathing, toileting, the phone, transportation, shopping, preparing meals, housework, laundry, medications or managing money? no  -In the past four weeks, was someone available to help you if you needed and wanted help with anything? yes  -Are you confident are you that you can control and manage most of your health problems? yes  -Have you been given information to help you keep track of your medications? yes  -How often do you have trouble taking your medications as prescribed? never    Fall Risk and Home Safety   Have you fallen 2 or more times in the past year? no  Does your home have rugs in the hallway, lack grab bars in the bathroom, lack handrails on the stairs or have poor lighting? no  Do you have smoke detectors and check them regularly? yes  Do you have difficulties driving a car? no  Do you always fasten your seat belt when you are in a car? yes    Review of Systems (if indicated for problems addressed today)   Card: denies chest pain  Pulm: denies shortness of breath      Physical Examination     Vitals:    06/21/18 1051 06/21/18 1148   BP: 140/80 130/78   Pulse: 63    Resp: 16    Temp: 98 °F (36.7 °C)    TempSrc: Oral    SpO2: 97%    Weight: 154 lb (69.9 kg)    Height: 5' 2\" (1.575 m)      Body mass index is 28.17 kg/(m^2). No exam data present  Was the patient's timed Up & Go test unsteady or longer than 30 seconds? no    Evaluation of Cognitive Function   Mood/affect:  happy  Orientation: normal  Appearance: age appropriate  Family member/caregiver input: none    Additional exam if indicated for problems addressed today:  General: stated age, well developed, well nourished and in NAD  Neck: supple, symmetrical, trachea midline, no adenopathy and thyroid: not enlarged, symmetric, no tenderness/mass/nodules  Lungs:  clear to auscultation w/o rales, rhonchi, wheezes w/normal effort and no use of accessory muscles of respiration   Heart: regular rate and rhythm, S1, S2 normal, no murmur, click, rub or gallop  Abdomen: soft, nontender, no masses  Ext:  No edema noted. MSK: no pain with int/ext rotation right hip, no tenderness laterally.  Low back is tender over right paraspinous muscles and down into SI region  Lymph: no cervical adenopathy appreciated  Skin:  Right lateral lower leg with approx 1.5cm stage 3 ulcer mildly tender with granulation tissue and small exudate, no surrounding erythema  Psych: alert and oriented to person, place, time and situation and Speech: appropriate quality, quantity and organization of sentences     Advice/Referrals/Counseling (as indicated)   Education and counseling provided for any problems identified above: diet, exercise    Preventive Services   (Preventive care checklist to be included in patient instructions)  Discussed today Done Previously Not Needed    X -13 X -23  Pneumococcal vaccines    x  Flu vaccine     x Hepatitis B vaccine (if at risk)   x   Shingles vaccine    x  TDAP vaccine    x  Mammogram     X normal Pap smear    x  Colorectal cancer screening     x Low-dose CT for lung cancer screening    x  Bone density test    X goes yearly  Glaucoma screening    x  Cholesterol test    x  Diabetes screening test      x Diabetes self-management class     x Nutritionist referral for diabetes or renal disease     Discussion of Advance Directive   Discussed with Jaylyn Helms her ability to prepare and advance directive in the case that an injury or illness causes her to be unable to make health care decisions. Has one, will bring us a record    Assessment/Plan   Z00.00    ICD-10-CM ICD-9-CM    1. Medicare annual wellness visit, subsequent Z00.00 V70.0    2. Subclinical hypothyroidism E03.9 244.8 THYROID ANTIBODY PANEL      TSH 3RD GENERATION      T4, FREE   3. Right hip pain M25.551 719.45    4. 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 733.00    5. Chronic bilateral low back pain without sciatica M54.5 724.2     G89.29 338.29    6. Gastroesophageal reflux disease without esophagitis K21.9 530.81 CBC WITH AUTOMATED DIFF   7. Caregiver burden Z63.6 V61.49    8. Acute pain of left shoulder M25.512 719.41    9.  Pure hypercholesterolemia Z39.28 980.6 METABOLIC PANEL, COMPREHENSIVE      LIPID PANEL        Orders Placed This Encounter    THYROID ANTIBODY PANEL  TSH 3RD GENERATION    T4, FREE    METABOLIC PANEL, COMPREHENSIVE    CBC WITH AUTOMATED DIFF    LIPID PANEL    CVD REPORT    CKD REPORT    DISCONTD: pneumococcal 13 vincent conj dip (PREVNAR-13) 0.5 mL syrg injection    DISCONTD: varicella-zoster recombinant, PF, (SHINGRIX, PF,) 50 mcg/0.5 mL susr injection    ibuprofen (MOTRIN) 600 mg tablet    acyclovir (ZOVIRAX) 400 mg tablet     Overall very healthy for her age; preventive care as above    Hip pain seems to be coming from her back  She will consult with ortho  Recommended PT, exercises  Uses prn motrin, relatively safe for her but advised that tylenol the safest  GERD not bothering her often but has med for prn use  Ulcer right leg from freezing is gradually getting smaller. Using ointment, keeping covered. Derm checked it one month ago. Reassuring that is gradually getting better. Reviewed worrisome signs or symptoms for which to call. Had reclast but not wanting another; side effects not worth it. Will recheck dexa after 2-3 years. Follow-up Disposition:  Return in about 6 months (around 12/21/2018) for Follow up.     Nicholas Freeman MD

## 2018-06-21 NOTE — ACP (ADVANCE CARE PLANNING)
Discussed with Karo Philip her ability to prepare and advance directive in the case that an injury or illness causes her to be unable to make health care decisions.    Has one, will bring us a record

## 2018-06-21 NOTE — PROGRESS NOTES
Chief Complaint   Patient presents with    Hip Pain     right hip pain x 2 weeks, yohannes. bad after sitting for awhile , seeing Dr. Ld Welch (ortho ) this afternoon        Reviewed Record in preparation for visit and have obtained necessary documentation. Identified pt with two pt identifiers (Name @ )    Health Maintenance Due   Topic    ZOSTER VACCINE AGE 60>     GLAUCOMA SCREENING Q2Y     Pneumococcal 65+ Low/Medium Risk (1 of 2 - PCV13)    MEDICARE YEARLY EXAM          1. Have you been to the ER, urgent care clinic since your last visit? Hospitalized since your last visit? No    2. Have you seen or consulted any other health care providers outside of the [facility] since your last visit? Include any pap smears or colon screening.  No

## 2018-06-21 NOTE — PATIENT INSTRUCTIONS
Resources to help caregivers of dementia patients:  Local expert Omar Afshin has occasional one-day workshops to train caregivers, as well as online webinars:  [email or web address]  [employer] caregiver events in [place] area:    [email or web address]    Please do call your dentist and ask them to send me records and if they think the reclast (osteoporosis medication) could have caused the problem    Please bring your advanced directive by for us to copy sometime soon    Let me know if you want to do physical therapy at [address] so I can fax referral to them  Address: [address], [place], [address]  Phone: [phone]  Fax: [phone]                Well Visit, Over 72: Care Instructions  Your Care Instructions    Physical exams can help you stay healthy. Your doctor has checked your overall health and may have suggested ways to take good care of yourself. He or she also may have recommended tests. At home, you can help prevent illness with healthy eating, regular exercise, and other steps. Follow-up care is a key part of your treatment and safety. Be sure to make and go to all appointments, and call your doctor if you are having problems. It's also a good idea to know your test results and keep a list of the medicines you take. How can you care for yourself at home? · Reach and stay at a healthy weight. This will lower your risk for many problems, such as obesity, diabetes, heart disease, and high blood pressure. · Get at least 30 minutes of exercise on most days of the week. Walking is a good choice. You also may want to do other activities, such as running, swimming, cycling, or playing tennis or team sports. · Do not smoke. Smoking can make health problems worse. If you need help quitting, talk to your doctor about stop-smoking programs and medicines. These can increase your chances of quitting for good.   · Protect your skin from too much sun. When you're outdoors from 10 a.m. to 4 p.m., stay in the shade or cover up with clothing and a hat with a wide brim. Wear sunglasses that block UV rays. Even when it's cloudy, put broad-spectrum sunscreen (SPF 30 or higher) on any exposed skin. · See a dentist one or two times a year for checkups and to have your teeth cleaned. · Wear a seat belt in the car. · Limit alcohol to 2 drinks a day for men and 1 drink a day for women. Too much alcohol can cause health problems. Follow your doctor's advice about when to have certain tests. These tests can spot problems early. For men and women  · Cholesterol. Your doctor will tell you how often to have this done based on your overall health and other things that can increase your risk for heart attack and stroke. · Blood pressure. Have your blood pressure checked during a routine doctor visit. Your doctor will tell you how often to check your blood pressure based on your age, your blood pressure results, and other factors. · Diabetes. Ask your doctor whether you should have tests for diabetes. · Vision. Experts recommend that you have yearly exams for glaucoma and other age-related eye problems. · Hearing. Tell your doctor if you notice any change in your hearing. You can have tests to find out how well you hear. · Colon cancer tests. Keep having colon cancer tests as your doctor recommends. You can have one of several types of tests. · Heart attack and stroke risk. At least every 4 to 6 years, you should have your risk for heart attack and stroke assessed. Your doctor uses factors such as your age, blood pressure, cholesterol, and whether you smoke or have diabetes to show what your risk for a heart attack or stroke is over the next 10 years. · Osteoporosis. Talk to your doctor about whether you should have a bone density test to find out whether you have thinning bones.  Also ask your doctor about whether you should take calcium and vitamin D supplements. For women  · Pap test and pelvic exam. You may no longer need a Pap test. Talk with your doctor about whether to stop or continue to have Pap tests. · Breast exam and mammogram. Ask how often you should have a mammogram, which is an X-ray of your breasts. A mammogram can spot breast cancer before it can be felt and when it is easiest to treat. · Thyroid disease. Talk to your doctor about whether to have your thyroid checked as part of a regular physical exam. Women have an increased chance of a thyroid problem. For men  · Prostate exam. Talk to your doctor about whether you should have a blood test (called a PSA test) for prostate cancer. Experts disagree on whether men should have this test. Some experts recommend that you discuss the benefits and risks of the test with your doctor. · Abdominal aortic aneurysm. Ask your doctor whether you should have a test to check for an aneurysm. You may need a test if you ever smoked or if your parent, brother, sister, or child has had an aneurysm. When should you call for help? Watch closely for changes in your health, and be sure to contact your doctor if you have any problems or symptoms that concern you. Where can you learn more? Go to http://anne-brian.info/. Enter N167 in the search box to learn more about \"Well Visit, Over 65: Care Instructions. \"  Current as of: May 12, 2017  Content Version: 11.4  © 5225-6732 [employer]. Care instructions adapted under license by [employer] (which disclaims liability or warranty for this information). If you have questions about a medical condition or this instruction, always ask your healthcare professional. [place] [address] any warranty or liability for your use of this information.     Discussed today Done Previously Not Needed    X -13 X -23  Pneumococcal vaccines    x  Flu vaccine     x Hepatitis B vaccine (if at risk)   x   Shingles vaccine    x TDAP vaccine    x  Mammogram     X normal Pap smear    x  Colorectal cancer screening     x Low-dose CT for lung cancer screening    x  Bone density test    x  Glaucoma screening    x  Cholesterol test    x  Diabetes screening test      x Diabetes self-management class     x Nutritionist referral for diabetes or renal disease

## 2018-06-21 NOTE — MR AVS SNAPSHOT
[address] [facility] 
 
 
 [address] [place] [address] 
[phone] Patient: Teddy Espinoza MRN: [id number] TWK:[id number] Visit Information Date & Time Provider Department Dept. Phone Encounter #  
 6/21/2018 10:45 AM Marce Bonilla, [address] [phone] [id number] Your Appointments 8/16/2018  1:00 PM  
Medicare Physical with Marce Bonilla MD  
[facility]) Appt Note: MWV/No CP/dw; r/s sth 6/11/18  
 [address] [phone]  
[phone]  
  
   
 [address] [phone] Upcoming Health Maintenance Date Due ZOSTER VACCINE AGE 60> 9/19/1995 GLAUCOMA SCREENING Q2Y 11/19/2000 Pneumococcal 65+ Low/Medium Risk (1 of 2 - PCV13) 11/19/2000 MEDICARE YEARLY EXAM 6/21/2018 Influenza Age 5 to Adult 8/1/2018 DTaP/Tdap/Td series (2 - Td) 7/30/2025 Allergies as of 6/21/2018  Review Complete On: 6/21/2018 By: Stephan Butler LPN Severity Noted Reaction Type Reactions Statins-hmg-coa Reductase Inhibitors  12/09/2016   Intolerance Myalgia Lots of different statins were tried Current Immunizations  Reviewed on 11/27/2017 Name Date Influenza High Dose Vaccine PF 10/17/2017 Tdap 7/30/2015 Not reviewed this visit You Were Diagnosed With   
  
 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  
 28.17 kg/m 2 1.75 m 2 Preferred Pharmacy Pharmacy Name Phone [id number] - [place], [address] [place] [address] [phone] Your Updated Medication List  
  
   
This list is accurate as of 18 11:52 AM.  Always use your most recent med list.  
  
  
  
  
 acyclovir 400 mg tablet Commonly known as:  ZOVIRAX Take 1 Tab by mouth two (2) times a day. To prevent cold sores Calcium-Cholecalciferol (D3) 500 mg(1,250mg) -125 unit Tab 1 per day FISH OIL PO Take  by mouth. 2 tablet a day  
  
 ibuprofen 600 mg tablet Commonly known as:  MOTRIN Take 600 mg by mouth daily. multivitamin tablet Commonly known as:  ONE A DAY Take 1 Tab by mouth daily. Takes up to 2 tabs on the 200 mg  
  
 omeprazole 20 mg capsule Commonly known as:  PRILOSEC Take 1 Cap by mouth daily. pneumococcal 13 vincent conj dip 0.5 mL Syrg injection Commonly known as:  PREVNAR-13  
0.5 mL by IntraMUSCular route once for 1 dose. red yeast rice extract 600 mg Cap Take 600 mg by mouth two (2) times a day. varicella-zoster recombinant (PF) 50 mcg/0.5 mL Susr injection Commonly known as:  SHINGRIX (PF)  
0.5 mL by IntraMUSCular route once for 1 dose. Prescriptions Printed Refills  
 pneumococcal 13 vincent conj dip (PREVNAR-13) 0.5 mL syrg injection 0 Si.5 mL by IntraMUSCular route once for 1 dose. Class: Print Route: IntraMUSCular  
 varicella-zoster recombinant, PF, (SHINGRIX, PF,) 50 mcg/0.5 mL susr injection 1 Si.5 mL by IntraMUSCular route once for 1 dose. Class: Print Route: IntraMUSCular We Performed the Following CBC WITH AUTOMATED DIFF [60439 CPT(R)] LIPID PANEL [99457 CPT(R)] METABOLIC PANEL, COMPREHENSIVE [06322 CPT(R)] T4, FREE [id number] CPT(R)] THYROID ANTIBODY PANEL [92770 CPT(R)] TSH 3RD GENERATION [96459 CPT(R)] Patient Instructions Resources to help caregivers of dementia patients: 
Local expert Harry Mclaughlin has occasional one-day workshops to train caregivers, as well as online webinars: 
[email or web address]-[email or web address] 
[employer] caregiver events in [facility] area: 
 
[email or web address] Please do call your dentist and ask them to send me records and if they think the reclast (osteoporosis medication) could have caused the problem Please bring your advanced directive by for us to copy sometime soon Let me know if you want to do physical therapy at [facility] so I can fax referral to them Address:  Bharath Christensen, [facility], [address] Phone: [phone] Fax: [phone] Well Visit, Over 72: Care Instructions Your Care Instructions Physical exams can help you stay healthy. Your doctor has checked your overall health and may have suggested ways to take good care of yourself. He or she also may have recommended tests. At home, you can help prevent illness with healthy eating, regular exercise, and other steps. Follow-up care is a key part of your treatment and safety. Be sure to make and go to all appointments, and call your doctor if you are having problems. It's also a good idea to know your test results and keep a list of the medicines you take. How can you care for yourself at home? · Reach and stay at a healthy weight.  This will lower your risk for many problems, such as obesity, diabetes, heart disease, and high blood pressure. · Get at least 30 minutes of exercise on most days of the week. Walking is a good choice. You also may want to do other activities, such as running, swimming, cycling, or playing tennis or team sports. · Do not smoke. Smoking can make health problems worse. If you need help quitting, talk to your doctor about stop-smoking programs and medicines. These can increase your chances of quitting for good. · Protect your skin from too much sun. When you're outdoors from 10 a.m. to 4 p.m., stay in the shade or cover up with clothing and a hat with a wide brim. Wear sunglasses that block UV rays. Even when it's cloudy, put broad-spectrum sunscreen (SPF 30 or higher) on any exposed skin. · See a dentist one or two times a year for checkups and to have your teeth cleaned. · Wear a seat belt in the car. · Limit alcohol to 2 drinks a day for men and 1 drink a day for women. Too much alcohol can cause health problems. Follow your doctor's advice about when to have certain tests. These tests can spot problems early. For men and women · Cholesterol. Your doctor will tell you how often to have this done based on your overall health and other things that can increase your risk for heart attack and stroke. · Blood pressure. Have your blood pressure checked during a routine doctor visit. Your doctor will tell you how often to check your blood pressure based on your age, your blood pressure results, and other factors. · Diabetes. Ask your doctor whether you should have tests for diabetes. · Vision. Experts recommend that you have yearly exams for glaucoma and other age-related eye problems. · Hearing. Tell your doctor if you notice any change in your hearing. You can have tests to find out how well you hear. · Colon cancer tests. Keep having colon cancer tests as your doctor recommends. You can have one of several types of tests. · Heart attack and stroke risk. At least every 4 to 6 years, you should have your risk for heart attack and stroke assessed. Your doctor uses factors such as your age, blood pressure, cholesterol, and whether you smoke or have diabetes to show what your risk for a heart attack or stroke is over the next 10 years. · Osteoporosis. Talk to your doctor about whether you should have a bone density test to find out whether you have thinning bones. Also ask your doctor about whether you should take calcium and vitamin D supplements. For women · Pap test and pelvic exam. You may no longer need a Pap test. Talk with your doctor about whether to stop or continue to have Pap tests. · Breast exam and mammogram. Ask how often you should have a mammogram, which is an X-ray of your breasts. A mammogram can spot breast cancer before it can be felt and when it is easiest to treat. · Thyroid disease. Talk to your doctor about whether to have your thyroid checked as part of a regular physical exam. Women have an increased chance of a thyroid problem. For men · Prostate exam. Talk to your doctor about whether you should have a blood test (called a PSA test) for prostate cancer. Experts disagree on whether men should have this test. Some experts recommend that you discuss the benefits and risks of the test with your doctor. · Abdominal aortic aneurysm. Ask your doctor whether you should have a test to check for an aneurysm. You may need a test if you ever smoked or if your parent, brother, sister, or child has had an aneurysm. When should you call for help? Watch closely for changes in your health, and be sure to contact your doctor if you have any problems or symptoms that concern you. Where can you learn more? Go to http://anne-brian.info/. Enter C666 in the search box to learn more about \"Well Visit, Over 65: Care Instructions. \" Current as of: May 12, 2017 Content Version: 11.4 © 9405-7664 Healthwise, [employer]. Care instructions adapted under license by [employer] (which disclaims liability or warranty for this information). If you have questions about a medical condition or this instruction, always ask your healthcare professional. [address] any warranty or liability for your use of this information. Discussed today Done Previously Not Needed X -13 X -23  Pneumococcal vaccines  
 x  Flu vaccine  
  x Hepatitis B vaccine (if at risk) x   Shingles vaccine  
 x  TDAP vaccine  
 x  Mammogram  
  X normal Pap smear  
 x  Colorectal cancer screening  
  x Low-dose CT for lung cancer screening  
 x  Bone density test  
 x  Glaucoma screening  
 x  Cholesterol test  
 x  Diabetes screening test   
  x Diabetes self-management class  
  x Nutritionist referral for diabetes or renal disease Introducing [facility]! Dear Kareen Gastelum: 
Thank you for requesting a I-Shake account. Our records indicate that you already have an active I-Shake account. You can access your account anytime at [email or web address] Did you know that you can access your hospital and ER discharge instructions at any time in I-Shake? You can also review all of your test results from your hospital stay or ER visit. Additional Information If you have questions, please visit the Frequently Asked Questions section of the I-Shake website at [email or web address] Remember, I-Shake is NOT to be used for urgent needs. For medical emergencies, dial 911. Now available from your iPhone and Android! Please provide this summary of care documentation to your next provider. Your primary care clinician is listed as Autumn Agudelo. If you have any questions after today's visit, please call [phone].

## 2018-06-22 LAB
ALBUMIN SERPL-MCNC: 4.6 G/DL (ref 3.5–4.7)
ALBUMIN/GLOB SERPL: 2.1 {RATIO} (ref 1.2–2.2)
ALP SERPL-CCNC: 64 IU/L (ref 39–117)
ALT SERPL-CCNC: 16 IU/L (ref 0–32)
AST SERPL-CCNC: 20 IU/L (ref 0–40)
BASOPHILS # BLD AUTO: 0 X10E3/UL (ref 0–0.2)
BASOPHILS NFR BLD AUTO: 0 %
BILIRUB SERPL-MCNC: 0.5 MG/DL (ref 0–1.2)
BUN SERPL-MCNC: 19 MG/DL (ref 8–27)
BUN/CREAT SERPL: 21 (ref 12–28)
CALCIUM SERPL-MCNC: 9.5 MG/DL (ref 8.7–10.3)
CHLORIDE SERPL-SCNC: 102 MMOL/L (ref 96–106)
CHOLEST SERPL-MCNC: 235 MG/DL (ref 100–199)
CO2 SERPL-SCNC: 22 MMOL/L (ref 20–29)
CREAT SERPL-MCNC: 0.92 MG/DL (ref 0.57–1)
EOSINOPHIL # BLD AUTO: 0.1 X10E3/UL (ref 0–0.4)
EOSINOPHIL NFR BLD AUTO: 2 %
ERYTHROCYTE [DISTWIDTH] IN BLOOD BY AUTOMATED COUNT: 13.7 % (ref 12.3–15.4)
GFR SERPLBLD CREATININE-BSD FMLA CKD-EPI: 58 ML/MIN/1.73
GFR SERPLBLD CREATININE-BSD FMLA CKD-EPI: 67 ML/MIN/1.73
GLOBULIN SER CALC-MCNC: 2.2 G/DL (ref 1.5–4.5)
GLUCOSE SERPL-MCNC: 94 MG/DL (ref 65–99)
HCT VFR BLD AUTO: 43 % (ref 34–46.6)
HDLC SERPL-MCNC: 71 MG/DL
HGB BLD-MCNC: 13.9 G/DL (ref 11.1–15.9)
IMM GRANULOCYTES # BLD: 0 X10E3/UL (ref 0–0.1)
IMM GRANULOCYTES NFR BLD: 0 %
INTERPRETATION, 910389: NORMAL
INTERPRETATION: NORMAL
LDLC SERPL CALC-MCNC: 132 MG/DL (ref 0–99)
LYMPHOCYTES # BLD AUTO: 1.8 X10E3/UL (ref 0.7–3.1)
LYMPHOCYTES NFR BLD AUTO: 23 %
MCH RBC QN AUTO: 29.1 PG (ref 26.6–33)
MCHC RBC AUTO-ENTMCNC: 32.3 G/DL (ref 31.5–35.7)
MCV RBC AUTO: 90 FL (ref 79–97)
MONOCYTES # BLD AUTO: 0.6 X10E3/UL (ref 0.1–0.9)
MONOCYTES NFR BLD AUTO: 7 %
NEUTROPHILS # BLD AUTO: 5.6 X10E3/UL (ref 1.4–7)
NEUTROPHILS NFR BLD AUTO: 68 %
PDF IMAGE, 910387: NORMAL
PLATELET # BLD AUTO: 272 X10E3/UL (ref 150–379)
POTASSIUM SERPL-SCNC: 4.9 MMOL/L (ref 3.5–5.2)
PROT SERPL-MCNC: 6.8 G/DL (ref 6–8.5)
RBC # BLD AUTO: 4.77 X10E6/UL (ref 3.77–5.28)
SODIUM SERPL-SCNC: 141 MMOL/L (ref 134–144)
T4 FREE SERPL-MCNC: 1.19 NG/DL (ref 0.82–1.77)
THYROGLOB AB SERPL-ACNC: <1 IU/ML (ref 0–0.9)
THYROPEROXIDASE AB SERPL-ACNC: 13 IU/ML (ref 0–34)
TRIGL SERPL-MCNC: 160 MG/DL (ref 0–149)
TSH SERPL DL<=0.005 MIU/L-ACNC: 4.13 UIU/ML (ref 0.45–4.5)
VLDLC SERPL CALC-MCNC: 32 MG/DL (ref 5–40)
WBC # BLD AUTO: 8.1 X10E3/UL (ref 3.4–10.8)

## 2018-07-19 ENCOUNTER — TELEPHONE (OUTPATIENT)
Dept: FAMILY MEDICINE CLINIC | Age: 83
End: 2018-07-19

## 2018-07-19 NOTE — TELEPHONE ENCOUNTER
Called pt ans she states that she was fine yesterday. She had played pickle ball and had no problem. Later in the day her knee started hurting her. She said that this had happened before. She is taking her ibuprofen that helps some she didn't know if she should do anything else. I recommended that she use ice, rest and keep elevated when sitting. Give it a few days, INI she should be see for eval and pt states understanding. She will call prn.

## 2018-07-19 NOTE — TELEPHONE ENCOUNTER
Patient is calling in regards to, having some issues with her left knee. She states she may have twisted it yesterday, she states the problem has occurred before. She states there is a little swelling, she is also wearing a small brace that she is able to up over knee. She is requesting a call back with suggestion of next steps she should take.      Best call back [phone]

## 2018-12-18 ENCOUNTER — TELEPHONE (OUTPATIENT)
Dept: FAMILY MEDICINE CLINIC | Age: 83
End: 2018-12-18

## 2018-12-18 ENCOUNTER — HOSPITAL ENCOUNTER (EMERGENCY)
Age: 83
Discharge: HOME | End: 2018-12-18
Attending: EMERGENCY MEDICINE
Payer: MEDICARE

## 2018-12-18 VITALS
OXYGEN SATURATION: 97 % | WEIGHT: 150 LBS | BODY MASS INDEX: 27.6 KG/M2 | DIASTOLIC BLOOD PRESSURE: 62 MMHG | HEART RATE: 77 BPM | SYSTOLIC BLOOD PRESSURE: 122 MMHG | TEMPERATURE: 98.1 F | HEIGHT: 62 IN | RESPIRATION RATE: 15 BRPM

## 2018-12-18 DIAGNOSIS — K62.5 RECTAL BLEEDING: Primary | ICD-10-CM

## 2018-12-18 LAB
ALBUMIN SERPL-MCNC: 3.6 G/DL (ref 3.5–5)
ALBUMIN/GLOB SERPL: 1 {RATIO} (ref 1.1–2.2)
ALP SERPL-CCNC: 73 U/L (ref 45–117)
ALT SERPL-CCNC: 25 U/L (ref 12–78)
ANION GAP SERPL CALC-SCNC: 9 MMOL/L (ref 5–15)
APPEARANCE UR: CLEAR
AST SERPL-CCNC: 16 U/L (ref 15–37)
BACTERIA URNS QL MICRO: NEGATIVE /HPF
BASOPHILS # BLD: 0 K/UL (ref 0–0.1)
BASOPHILS NFR BLD: 1 % (ref 0–1)
BILIRUB SERPL-MCNC: 0.5 MG/DL (ref 0.2–1)
BILIRUB UR QL: NEGATIVE
BUN SERPL-MCNC: 22 MG/DL (ref 6–20)
BUN/CREAT SERPL: 26 (ref 12–20)
CALCIUM SERPL-MCNC: 9.4 MG/DL (ref 8.5–10.1)
CHLORIDE SERPL-SCNC: 103 MMOL/L (ref 97–108)
CO2 SERPL-SCNC: 28 MMOL/L (ref 21–32)
COLOR UR: ABNORMAL
COMMENT, HOLDF: NORMAL
CREAT SERPL-MCNC: 0.85 MG/DL (ref 0.55–1.02)
DIFFERENTIAL METHOD BLD: NORMAL
EOSINOPHIL # BLD: 0.1 K/UL (ref 0–0.4)
EOSINOPHIL NFR BLD: 2 % (ref 0–7)
EPITH CASTS URNS QL MICRO: ABNORMAL /LPF
ERYTHROCYTE [DISTWIDTH] IN BLOOD BY AUTOMATED COUNT: 12.4 % (ref 11.5–14.5)
GLOBULIN SER CALC-MCNC: 3.5 G/DL (ref 2–4)
GLUCOSE SERPL-MCNC: 94 MG/DL (ref 65–100)
GLUCOSE UR STRIP.AUTO-MCNC: NEGATIVE MG/DL
HCT VFR BLD AUTO: 42.2 % (ref 35–47)
HEMOCCULT STL QL: POSITIVE
HGB BLD-MCNC: 13.5 G/DL (ref 11.5–16)
HGB UR QL STRIP: NEGATIVE
HYALINE CASTS URNS QL MICRO: ABNORMAL /LPF (ref 0–5)
IMM GRANULOCYTES # BLD: 0 K/UL (ref 0–0.04)
IMM GRANULOCYTES NFR BLD AUTO: 0 % (ref 0–0.5)
KETONES UR QL STRIP.AUTO: NEGATIVE MG/DL
LEUKOCYTE ESTERASE UR QL STRIP.AUTO: ABNORMAL
LYMPHOCYTES # BLD: 1.7 K/UL (ref 0.8–3.5)
LYMPHOCYTES NFR BLD: 27 % (ref 12–49)
MCH RBC QN AUTO: 28.9 PG (ref 26–34)
MCHC RBC AUTO-ENTMCNC: 32 G/DL (ref 30–36.5)
MCV RBC AUTO: 90.4 FL (ref 80–99)
MONOCYTES # BLD: 0.6 K/UL (ref 0–1)
MONOCYTES NFR BLD: 10 % (ref 5–13)
NEUTS SEG # BLD: 3.8 K/UL (ref 1.8–8)
NEUTS SEG NFR BLD: 61 % (ref 32–75)
NITRITE UR QL STRIP.AUTO: NEGATIVE
NRBC # BLD: 0 K/UL (ref 0–0.01)
NRBC BLD-RTO: 0 PER 100 WBC
PH UR STRIP: 6 [PH] (ref 5–8)
PLATELET # BLD AUTO: 291 K/UL (ref 150–400)
PMV BLD AUTO: 9 FL (ref 8.9–12.9)
POTASSIUM SERPL-SCNC: 4 MMOL/L (ref 3.5–5.1)
PROT SERPL-MCNC: 7.1 G/DL (ref 6.4–8.2)
PROT UR STRIP-MCNC: NEGATIVE MG/DL
RBC # BLD AUTO: 4.67 M/UL (ref 3.8–5.2)
RBC #/AREA URNS HPF: ABNORMAL /HPF (ref 0–5)
SAMPLES BEING HELD,HOLD: NORMAL
SODIUM SERPL-SCNC: 140 MMOL/L (ref 136–145)
SP GR UR REFRACTOMETRY: 1.01 (ref 1–1.03)
UR CULT HOLD, URHOLD: NORMAL
UROBILINOGEN UR QL STRIP.AUTO: 0.2 EU/DL (ref 0.2–1)
WBC # BLD AUTO: 6.3 K/UL (ref 3.6–11)
WBC URNS QL MICRO: ABNORMAL /HPF (ref 0–4)

## 2018-12-18 PROCEDURE — 99284 EMERGENCY DEPT VISIT MOD MDM: CPT

## 2018-12-18 PROCEDURE — 81001 URINALYSIS AUTO W/SCOPE: CPT

## 2018-12-18 PROCEDURE — 82272 OCCULT BLD FECES 1-3 TESTS: CPT

## 2018-12-18 PROCEDURE — 36415 COLL VENOUS BLD VENIPUNCTURE: CPT

## 2018-12-18 PROCEDURE — 85025 COMPLETE CBC W/AUTO DIFF WBC: CPT

## 2018-12-18 PROCEDURE — 80053 COMPREHEN METABOLIC PANEL: CPT

## 2018-12-18 NOTE — ED PROVIDER NOTES
'BRBPR x 2/ both this am/ on blood thinners/ no abd pain/ no other c/o;       pt denies HA, vison changes, diff swallowing, CP, SOB, Abd pain, F/Ch, N/V, D/Cons or other current systemic complaints    1:12 PM  I have evaluated the patient as the Provider in Triage. I have reviewed Her vital signs and the triage nurse assessment. I have talked with the patient and any available family and advised that I am the provider in triage and have ordered the appropriate study to initiate their work up based on the clinical presentation during my assessment. I have advised that the patient will be accommodated in the Main ED as soon as possible. I have also requested to contact the triage nurse or myself immediately if the patient experiences any changes in their condition during this brief waiting period. Danna Pickens MD      Patient is an 51-year-old female with a past medical history significant for GERD, dyslipidemia and arthritis who is ambulatory to the ED today with complaints of rectal bleeding x2 episodes earlier today. Patient states she contacted her primary care provider he could not give her an appointment today so she sent her to the ED for an evaluation. Patient states she had 2 episodes of bloody diarrhea this morning. She's had none since. Patient states she takes ibuprofen for her osteoarthritis in her back pain. She also uses acyclovir medication for fever blisters. Patient denies dizziness, lightheadedness, nausea, vomiting, chest pain, shortness of breath, fevers or chills. Patient has no further complaints at this time.     Primary care provider:Sebastian Ma MD               Past Medical History:   Diagnosis Date    Arthritis     Gastrointestinal disorder     gerd    GERD (gastroesophageal reflux disease)     Ill-defined condition     cholesterol       Past Surgical History:   Procedure Laterality Date    HX BREAST BIOPSY  10-15 yrs ago    neg path, unsure which breast    HX COLONOSCOPY 01/01/2012    approx date, normal    HX ENDOSCOPY      no ulcers    HX ORTHOPAEDIC      right shoulder    HX WISDOM TEETH EXTRACTION           Family History:   Problem Relation Age of Onset    Hypertension Mother     Kidney Disease Mother         dialysis    No Known Problems Father     Cancer Brother         tongue, lung       Social History     Socioeconomic History    Marital status:      Spouse name: Not on file    Number of children: Not on file    Years of education: Not on file    Highest education level: Not on file   Social Needs    Financial resource strain: Not on file    Food insecurity - worry: Not on file    Food insecurity - inability: Not on file   [employer] needs - medical: Not on file   [employer] needs - non-medical: Not on file   Occupational History    Not on file   Tobacco Use    Smoking status: Never Smoker    Smokeless tobacco: Never Used   Substance and Sexual Activity    Alcohol use: Yes     Comment: mixed drink or wine 3-4x per week    Drug use: No    Sexual activity: No   Other Topics Concern    Not on file   Social History Narrative    Has 4 children and 4 grandchildren     has dementia    Plays pickleball for exercise         ALLERGIES: Statins-hmg-coa reductase inhibitors    Review of Systems   Constitutional: Negative for appetite change, chills and fever. HENT: Negative. Respiratory: Negative for cough, shortness of breath and wheezing. Cardiovascular: Negative for chest pain. Gastrointestinal: Positive for blood in stool. Negative for abdominal pain, constipation, diarrhea, nausea and vomiting. Genitourinary: Negative for dysuria and urgency. Musculoskeletal: Negative for back pain. Skin: Negative for color change and rash. Neurological: Negative for dizziness and headaches. Psychiatric/Behavioral: Negative. All other systems reviewed and are negative. There were no vitals filed for this visit. Physical Exam   Constitutional: She is oriented to person, place, and time. She appears well-developed and well-nourished. HENT:   Head: Normocephalic and atraumatic. Neck: Normal range of motion. Neck supple. Cardiovascular: Normal rate, regular rhythm, normal heart sounds and intact distal pulses. Pulmonary/Chest: Effort normal and breath sounds normal. No respiratory distress. She has no wheezes. She has no rales. She exhibits no tenderness. Abdominal: Soft. Bowel sounds are normal. There is no tenderness. There is no guarding. Genitourinary: Rectal exam shows tenderness and guaiac positive stool. Rectal exam shows no external hemorrhoid, no internal hemorrhoid, no fissure, no mass and anal tone normal. No vaginal discharge found. Musculoskeletal: Normal range of motion. Neurological: She is alert and oriented to person, place, and time. Skin: Skin is warm and dry. No erythema. Psychiatric: She has a normal mood and affect. Her behavior is normal. Judgment and thought content normal.   Nursing note and vitals reviewed. MDM     Procedures    Assessment & Plan:     Orders Placed This Encounter    URINE CULTURE HOLD SAMPLE    CBC WITH AUTOMATED DIFF    METABOLIC PANEL, COMPREHENSIVE    OCCULT BLOOD, STOOL    Hold Sample    URINALYSIS W/MICROSCOPIC       Discussed with Sofía Patino MD,ED Provider    Florecita Batres NP  12/18/18  2:46 PM    Hemoglobin is stable. Patient has been advised to stop taking NSAIDs. Patient is to followup in the next couple of days with Dr. Shayla Alcantara or someone in his office. Discussed return precautions. 4:43 PM  The patient has been reevaluated. The patient is ready for discharge. The patient's signs, symptoms, diagnosis, and discharge instructions have been discussed and the patient/ family has conveyed their understanding. The patient is to follow up as recommended or return to the ED should their symptoms worsen.  Plan has been discussed and the patient is in agreement. LABORATORY TESTS:  Labs Reviewed   METABOLIC PANEL, COMPREHENSIVE - Abnormal; Notable for the following components:       Result Value    BUN 22 (*)     BUN/Creatinine ratio 26 (*)     A-G Ratio 1.0 (*)     All other components within normal limits   OCCULT BLOOD, STOOL - Abnormal; Notable for the following components:    Occult blood, stool POSITIVE (*)     All other components within normal limits   URINALYSIS W/MICROSCOPIC - Abnormal; Notable for the following components:    Leukocyte Esterase TRACE (*)     All other components within normal limits   URINE CULTURE HOLD SAMPLE   CBC WITH AUTOMATED DIFF   SAMPLES BEING HELD   SAMPLE TO BLOOD BANK       IMAGING RESULTS:  No results found. MEDICATIONS GIVEN:  Medications - No data to display    IMPRESSION:  1. Rectal bleeding        PLAN:  1. Discharge Medication List as of 12/18/2018  4:34 PM      CONTINUE these medications which have NOT CHANGED    Details   ibuprofen (MOTRIN) 600 mg tablet Take 1 Tab by mouth every six (6) hours as needed for Pain., Normal, Disp-120 Tab, R-1      acyclovir (ZOVIRAX) 400 mg tablet Take 1 Tab by mouth two (2) times a day. To prevent cold sores, Normal, Disp-180 Tab, R-3      Calcium-Cholecalciferol, D3, 500 mg(1,250mg) -125 unit tab 1 per day, Historical Med      multivitamin (ONE A DAY) tablet Take 1 Tab by mouth daily. Takes up to 2 tabs on the 200 mg, Historical Med      omeprazole (PRILOSEC) 20 mg capsule Take 1 Cap by mouth daily. , Normal, Disp-90 Cap, R-3      DOCOSAHEXANOIC ACID/EPA (FISH OIL PO) Take  by mouth. 2 tablet a day, Historical Med      red yeast rice extract 600 mg cap Take 600 mg by mouth two (2) times a day., Historical Med           2.    Follow-up Information     Follow up With Specialties Details Why Contact Info    Sydney Lindsey MD Gastroenterology Schedule an appointment as soon as possible for a visit for rectal bleeding ShavonTricia Guajardo  [facility] [phone] Johnny Mckeon MD Family Practice Schedule an appointment as soon as possible for a visit As needed [phone]      [facility] EMERGENCY DEPT Emergency Medicine  As needed, If symptoms worsen [facility]  [phone]        3.      Return to ED for new or worsening symptoms       Jayne Tong NP

## 2018-12-18 NOTE — DISCHARGE INSTRUCTIONS
Thank you for allowing us to care for you today. Please follow-up with your Primary Care provider in the next 2-3 days if your symptoms do not improve. Plan for home:     Avoid all NSAIDS. This includes aspirin,Advil, Motrin, Voltaren,Diclofenac, Etodolac, Fenoprofen, Flurbiprofen, Ibuprofen, Indomethacin, Ketoprofen, Ketorolac, Meclofenamate, Mefenamic acid, Naproxen, Oxaprozin, Piroxicam, Sulindac, Tiaprofenic acid and Tolmetin    Follow-up with Gastroenterology in the next few days. Come back to the ER if you have worsening symptoms, increase in rectal bleeding, dark, tarry stools, weakness, dizziness, fevers over 100.9, shaking chills, nausea or vomiting. Rectal Bleeding: Care Instructions  Your Care Instructions    Rectal bleeding in small amounts is common. You may see red spotting on toilet paper or drops of blood in the toilet. Rectal bleeding has many possible causes, from something as minor as hemorrhoids to something as serious as colon cancer. You may need more tests to find the cause of your bleeding. Follow-up care is a key part of your treatment and safety. Be sure to make and go to all appointments, and call your doctor if you are having problems. It's also a good idea to know your test results and keep a list of the medicines you take. How can you care for yourself at home? · Avoid aspirin and other nonsteroidal anti-inflammatory drugs (NSAIDs), such as ibuprofen (Advil, Motrin) and naproxen (Aleve). They can cause you to bleed more. Ask your doctor if you can take acetaminophen (Tylenol). Read and follow all instructions on the label. · Use a stool softener that contains bran or psyllium. You can save money by buying bran or psyllium (available in bulk at most health food stores) and sprinkling it on foods or stirring it into fruit juice. You can also use a product such as Metamucil or Citrucel. · Take your medicines exactly as directed.  Call your doctor if you think you are having a problem with your medicine. When should you call for help? Call 911 anytime you think you may need emergency care. For example, call if:    · You passed out (lost consciousness).    Call your doctor now or seek immediate medical care if:    · You have new or worse pain.     · You have new or worse bleeding from the rectum.     · You are dizzy or light-headed, or you feel like you may faint.    Watch closely for changes in your health, and be sure to contact your doctor if:    · You cannot pass stools or gas.     · You do not get better as expected. Where can you learn more? Go to http://anne-brian.info/. Enter W878 in the search box to learn more about \"Rectal Bleeding: Care Instructions. \"  Current as of: March 28, 2018  Content Version: 11.8  © 4939-0261 Healthwise, [employer]. Care instructions adapted under license by [employer] (which disclaims liability or warranty for this information). If you have questions about a medical condition or this instruction, always ask your healthcare professional. [address] any warranty or liability for your use of this information.

## 2018-12-18 NOTE — TELEPHONE ENCOUNTER
Pt is calling in regards to being able to have a call back from office in regards to her having 2 bloody stools. She notes that today is the first day for blood to be present in stool. Pt was offered to be seen in office today with available providers, Pt preferred for an message to be sent back to nurse and provider, and a call back with recommendation.      Best call back number  [phone]

## 2018-12-18 NOTE — TELEPHONE ENCOUNTER
ER would be safest, if she is willing.   I will see her here if she absolutely doesn't want to go to ER

## 2018-12-18 NOTE — TELEPHONE ENCOUNTER
Called pt and she states that she had 2 BM's today that were soft, loose with some form. She noticed that there was bright red blood in the commode and in the stool (both times). She said that it was quite a bit of blood. She denies any pain or hx of hemorrhoids. She has been having normal BM's up until today. She said that her  had had colon CA and she didn't know if she should just make an appt or go to the ER. It just made her nervous. Let her know that I would check with Dr Eber Blount and get back with her.

## 2018-12-19 ENCOUNTER — PATIENT OUTREACH (OUTPATIENT)
Dept: FAMILY MEDICINE CLINIC | Age: 83
End: 2018-12-19

## 2018-12-19 NOTE — PROGRESS NOTES
ED Discharge Follow-Up    Date/Time:     2018 11:19 AM    Patient presented to [facility]  ED on  and was diagnosed with rectal bleeding. Top Challenges reviewed with the provider   [facility] ED -dx rectal bleeding. Hgb stable. Appt to see GI, , on . Method of communication with provider :face to face,chart routing    Nurse Navigator(NN) contacted the  patient  by telephone to perform post ED discharge assessment. Verified name and  with patient as identifiers. Provided introduction to self, and explanation of the Nurse Navigator role. Reminded to avoid the NSAIDS and Aspirin. Patient reported assessment: better, still some bleeding from rectum-darker blood noted. Medication(s):   New Medications at Discharge: none  Changed Medications at Discharge: none  Discontinued Medications at Discharge: none-was advised to stop all NSAIDS    There were no barriers to obtaining medications identified at this time. Reviewed discharge instructions and red flags with  patient who voiced understanding. Patient given an opportunity to ask questions and does not have any further questions or concerns at this time. The patient agrees to contact the PCP office for questions related to their healthcare. Patient reminded that there are physicians on call 24 hours a day / 7 days a week (M-F 5pm to 8am and from Friday 5pm until Monday 8am for the weekend) should the patient have questions or concerns. NN provided contact information for future reference. Offered follow up appointment with PCP: yes -plans to see GI for now. [facility] follow up appointment(s): No future appointments. Non-[facility] follow up appointment(s): , GI,   [facility] [place]:  information provided as a resource    www. [email or web address] 9 AM- 9 PM.   Phone [phone]      Goals      education on rectal bleeding care      18 [facility] ED  rectal bleeding  · Avoid aspirin and other NSAIDs-can cause you to bleed more. · Use stool softener that contains bran or psyllium. Can use Metamucil or Citrucel. · Take meds as directed. · Report new or worse bleeding from rectum. · F/u on appt with pcp and specialists(GI)- she has sched appt with GI for 12/20-.  · Report any dizziness or light-headedness. · Advised NN will call back in 5-7 days for update. · NN contact information provided. mbt       Supportive resources in place to maintain patient in the community (ie., home health, equipment, DME, refer to, etc.)      12/19/18 [facility] ED 12/18 rectal bleeding. · Gave info on [facility] as resource. · Reminded we have after hour office visits available and Saturday urgent care. · Provider on call after hours and Saturday acute care hours. · Gave NN contact information if any questions/concerns. · Advised will call back in next 5-7 days for update. mbt

## 2018-12-27 RX ORDER — GLUCOSAMINE/CHONDR SU A SOD 750-600 MG
2500 TABLET ORAL DAILY
COMMUNITY

## 2018-12-27 NOTE — PERIOP NOTES
[address]  Endoscopy Preprocedure Instructions      1. On the day of your surgery, please report to registration located on the 2nd floor of the  [facility]. yes    2. You must have a responsible adult to drive you to the hospital, stay at the hospital during your procedure and drive you home. If they leave your procedure will not be started (no exceptions). yes    3. Do not have anything to eat or drink (including water, gum, mints, coffee, and juice) after midnight. This does not apply to the medications you were instructed to take by your physician. yesIf you are currently taking Plavix, Coumadin, Aspirin, or other blood-thinning agents, contact your physician for special instructions. yes,    4. If you are having a procedure that requires bowel prep: We recommend that you have only clear liquids the day before your procedure and begin your bowel prep by 5:00 pm.  You may continue to drink clear liquids until midnight. If for any reason you are not able to complete your prep please notify your physician immediately. yes    5. Have a list of all current medications, including vitamins, herbal supplements and any other over the counter medications. yes    6. If you wear glasses, contacts, dentures and/or hearing aids, they may be removed prior to procedure, please bring a case to store them in. yes    7. You should understand that if you do not follow these instructions your procedure may be cancelled. If your physical condition changes (I.e. fever, cold or flu) please contact your doctor as soon as possible. 8. It is important that you be on time.   If for any reason you are unable to keep your appointment please call )- the day of or your physicians office prior to your scheduled procedure

## 2019-01-04 ENCOUNTER — HOSPITAL ENCOUNTER (OUTPATIENT)
Age: 84
Setting detail: OUTPATIENT SURGERY
Discharge: HOME | End: 2019-01-04
Attending: SPECIALIST | Admitting: SPECIALIST
Payer: MEDICARE

## 2019-01-04 ENCOUNTER — ANESTHESIA (OUTPATIENT)
Dept: ENDOSCOPY | Age: 84
End: 2019-01-04
Payer: MEDICARE

## 2019-01-04 ENCOUNTER — ANESTHESIA EVENT (OUTPATIENT)
Dept: ENDOSCOPY | Age: 84
End: 2019-01-04
Payer: MEDICARE

## 2019-01-04 VITALS
HEART RATE: 57 BPM | OXYGEN SATURATION: 100 % | BODY MASS INDEX: 27.6 KG/M2 | RESPIRATION RATE: 17 BRPM | DIASTOLIC BLOOD PRESSURE: 51 MMHG | SYSTOLIC BLOOD PRESSURE: 127 MMHG | HEIGHT: 62 IN | WEIGHT: 150 LBS | TEMPERATURE: 97.7 F

## 2019-01-04 PROCEDURE — 74011250636 HC RX REV CODE- 250/636

## 2019-01-04 PROCEDURE — 76040000019: Performed by: SPECIALIST

## 2019-01-04 PROCEDURE — 76060000031 HC ANESTHESIA FIRST 0.5 HR: Performed by: SPECIALIST

## 2019-01-04 PROCEDURE — 74011250636 HC RX REV CODE- 250/636: Performed by: SPECIALIST

## 2019-01-04 PROCEDURE — 88305 TISSUE EXAM BY PATHOLOGIST: CPT

## 2019-01-04 PROCEDURE — 77030013992 HC SNR POLYP ENDOSC BSC -B: Performed by: SPECIALIST

## 2019-01-04 RX ORDER — SODIUM CHLORIDE 9 MG/ML
INJECTION, SOLUTION INTRAVENOUS
Status: DISCONTINUED | OUTPATIENT
Start: 2019-01-04 | End: 2019-01-04 | Stop reason: HOSPADM

## 2019-01-04 RX ORDER — FLUMAZENIL 0.1 MG/ML
0.2 INJECTION INTRAVENOUS
Status: DISCONTINUED | OUTPATIENT
Start: 2019-01-04 | End: 2019-01-04 | Stop reason: HOSPADM

## 2019-01-04 RX ORDER — SODIUM CHLORIDE 9 MG/ML
50 INJECTION, SOLUTION INTRAVENOUS CONTINUOUS
Status: DISCONTINUED | OUTPATIENT
Start: 2019-01-04 | End: 2019-01-04 | Stop reason: HOSPADM

## 2019-01-04 RX ORDER — PROPOFOL 10 MG/ML
INJECTION, EMULSION INTRAVENOUS AS NEEDED
Status: DISCONTINUED | OUTPATIENT
Start: 2019-01-04 | End: 2019-01-04 | Stop reason: HOSPADM

## 2019-01-04 RX ORDER — DEXTROMETHORPHAN/PSEUDOEPHED 2.5-7.5/.8
1.2 DROPS ORAL
Status: DISCONTINUED | OUTPATIENT
Start: 2019-01-04 | End: 2019-01-04 | Stop reason: HOSPADM

## 2019-01-04 RX ORDER — MIDAZOLAM HYDROCHLORIDE 1 MG/ML
.25-5 INJECTION, SOLUTION INTRAMUSCULAR; INTRAVENOUS AS NEEDED
Status: DISCONTINUED | OUTPATIENT
Start: 2019-01-04 | End: 2019-01-04 | Stop reason: HOSPADM

## 2019-01-04 RX ORDER — NALOXONE HYDROCHLORIDE 0.4 MG/ML
0.4 INJECTION, SOLUTION INTRAMUSCULAR; INTRAVENOUS; SUBCUTANEOUS
Status: DISCONTINUED | OUTPATIENT
Start: 2019-01-04 | End: 2019-01-04 | Stop reason: HOSPADM

## 2019-01-04 RX ORDER — FENTANYL CITRATE 50 UG/ML
25 INJECTION, SOLUTION INTRAMUSCULAR; INTRAVENOUS AS NEEDED
Status: DISCONTINUED | OUTPATIENT
Start: 2019-01-04 | End: 2019-01-04 | Stop reason: HOSPADM

## 2019-01-04 RX ORDER — PROPOFOL 10 MG/ML
INJECTION, EMULSION INTRAVENOUS
Status: DISCONTINUED | OUTPATIENT
Start: 2019-01-04 | End: 2019-01-04 | Stop reason: HOSPADM

## 2019-01-04 RX ORDER — LIDOCAINE HYDROCHLORIDE 20 MG/ML
INJECTION, SOLUTION EPIDURAL; INFILTRATION; INTRACAUDAL; PERINEURAL AS NEEDED
Status: DISCONTINUED | OUTPATIENT
Start: 2019-01-04 | End: 2019-01-04 | Stop reason: HOSPADM

## 2019-01-04 RX ADMIN — SODIUM CHLORIDE 50 ML/HR: 900 INJECTION, SOLUTION INTRAVENOUS at 08:27

## 2019-01-04 RX ADMIN — PROPOFOL 140 MCG/KG/MIN: 10 INJECTION, EMULSION INTRAVENOUS at 08:40

## 2019-01-04 RX ADMIN — PROPOFOL 40 MG: 10 INJECTION, EMULSION INTRAVENOUS at 08:40

## 2019-01-04 RX ADMIN — PROPOFOL 10 MG: 10 INJECTION, EMULSION INTRAVENOUS at 08:45

## 2019-01-04 RX ADMIN — PROPOFOL 20 MG: 10 INJECTION, EMULSION INTRAVENOUS at 08:43

## 2019-01-04 RX ADMIN — LIDOCAINE HYDROCHLORIDE 20 MG: 20 INJECTION, SOLUTION EPIDURAL; INFILTRATION; INTRACAUDAL; PERINEURAL at 08:40

## 2019-01-04 RX ADMIN — SODIUM CHLORIDE: 9 INJECTION, SOLUTION INTRAVENOUS at 07:52

## 2019-01-04 NOTE — ANESTHESIA PREPROCEDURE EVALUATION
Anesthetic History   No history of anesthetic complications            Review of Systems / Medical History  Patient summary reviewed and pertinent labs reviewed    Pulmonary                   Neuro/Psych              Cardiovascular                Pertinent negatives: Hyperlipidemia: unable to tolerate meds.   Exercise tolerance: >4 METS     GI/Hepatic/Renal     GERD: well controlled           Endo/Other        Arthritis     Other Findings              Physical Exam    Airway  Mallampati: II  TM Distance: 4 - 6 cm  Neck ROM: normal range of motion   Mouth opening: Normal     Cardiovascular    Rhythm: regular  Rate: normal         Dental    Dentition: Upper dentition intact and Lower dentition intact     Pulmonary  Breath sounds clear to auscultation               Abdominal         Other Findings            Anesthetic Plan    ASA: 2  Anesthesia type: MAC          Induction: Intravenous  Anesthetic plan and risks discussed with: Patient

## 2019-01-04 NOTE — H&P
Date: 2018 9:45 AM   Patient Name: Ac Tamayo   Account #: [id number]    Gender: Female    (age): 1935 (80)       Provider:     Ronald Mccabe MD        Referring Physician:     Elena Reese  [address], [place], [address]  [phone] (phone)  [phone] (fax)        Chief Complaint: Rectal bleeding           History of Present Illness: This patient is seen for evaluation of blood per rectum. Symptoms began 2 days ago. Bleeding has occurred at a frequency of 1 times per day. The patient estimates seeing  a tablespoon of  bright red blood in the stool. Stools have been soft in consistency. Has noted none in association with the bleeding. Previous pertinent conditions that have been diagnosed in relation to this problem include none. The patient has tried resolved spontaneously for empiric treatment of suspected hemorrhoids. Prior notable interventions/surgeries include none. A colonoscopy was performed 9 years ago by Dr Melissa Ballard ago. Findings from that exam included diverticulosis. ? This patient is seen for evaluation of blood per rectum. ? Symptoms began ? 2? ?days? ago. ? ?Bleeding has occurred at a frequency of ? 1? times per ?day? .?  The patient estimates seeing ? ?a tablespoon? of ? ? ?bright red? ? blood in the stool. ? Stools have been ? soft? in consistency. ? ? Has noted ?none? in association with the bleeding. ? ?Previous pertinent conditions that have been diagnosed in relation to this problem include ?none? .  ? ?The patient has tried ? resolved spontaneously? for empiric treatment of suspected hemorrhoids. ? ? Prior notable interventions/surgeries include ?none? .  ? ?A colonoscopy was performed ? 9? ?years ago by Dr Melissa Ballard? ago.? ? Findings from that exam included ?diverticulosis? . ? ?       Past Medical History      Medical Conditions: Arthritis  High cholesterol   Surgical Procedures: Right shoulder-Rotator cuff   Dx Studies: Colonoscopy  CT Scan   Medications: acyclovir 200 mg/5 mL  Fish Oil 120-180 mg  ibuprofen 600 mg Take 1 tablet by mouth four times a day as needed  multi vitamin  red yeast rice 600 mg   Allergies: Patient has no known allergies or drug allergies   Immunizations: Flu vaccine  Pneumococcal conjugate PCV 13      Social History      Alcohol: Alcohol consumption: Weekly. Tobacco:    Drugs: None   Exercise: Exercise 3 or more times a week. Caffeine: Daily. Marital Status:          Occupation:               Family History No history of Colon Cancer, Colon Polyps, Esophogeal Cancer, GI Cancers, IBD (Crohn's or UC), Liver disease        Review of Systems:   Cardiovascular: Denies chest pain, irregular heart beat, palpitations, peripheral edema, syncope, Sweats. Constitutional: Presents suffers from fatigue. Denies fever, loss of appetite, weight gain, weight loss. ENMT: Denies nose bleeds, sore throat, hearing loss. Endocrine: Denies excessive thirst, heat intolerance. Eyes: Denies loss of vision. Gastrointestinal: Presents suffers from rectal bleeding. Denies abdominal pain, abdominal swelling, change in bowel habits, constipation, diarrhea, Bloating/gas, heartburn, jaundice, nausea, stomach cramps, vomiting, dysphagia, rectal pain, Stool incontinence, hematemesis. Genitourinary: Denies dark urine, dysuria, frequent urination, hematuria, incontinence. Hematologic/Lymphatic: Denies easy bruising, prolonged bleeding. Integumentary: Denies itching, rashes, sun sensitivity. Musculoskeletal: Presents suffers from arthritis, back pain, joint pain. Denies gout, muscle weakness, stiffness. Neurological: Presents suffers from memory loss. Denies dizziness, fainting, frequent headaches. Psychiatric: Denies anxiety, depression, difficulty sleeping, hallucinations, nervousness, panic attacks, paranoia. Respiratory: Denies cough, dyspnea, wheezing.       Vital Signs:   BP  (mmHg)  Pulse  (ppm) Rhythm Weight (lbs/oz) Height (ft/in) BMI Temp   108/84 72 Regular 155 / 2 5 / 2 28.37 97.6 (F)      Physical Exam:   Constitutional:      Appearance: No distress, appears comfortable. Communication: Understands/receives spoken information. Skin:      Inspection: No rash, no jaundice. Palpation: No subcutaneous nodules. Head/face: Inspection: Normacephalic, atraumatic. Palpation: normal.   Eyes:      Conjunctivae/lids: Normal.   Pupils/irises: Pupils equal, round and normal.   ENMT:      External: Normal.   Hearing: Normal.   Neck:      Neck: Normal appearance, trachea midline. Jugular veins: No JVD noted. Respiratory:      Effort: Normal respiratory effort, comfortable, speaks in complete sentences. Gastrointestinal/Abdomen:      Abdomen: non-distended, nontender. BETTY to be done at the time of colonoscopy. Liver/Spleen: normal, normal size, Liver size and consistency normal, spleen is non-palpable. Musculoskeletal:      Gait/station: normal.   Back: no kyphosis or scoliosis. Muscles: normal strength and tone, no atrophy or abnormal movements. Psychiatric:      Judgment/insight: Normal, normal judgement, normal insight. Orientation: oriented to time, space and person. Memory: normal short term memory, normal long term memory, no memory loss. Mood and affect: Normal mood, affect full, no evidence of depression, anxiety or agitation. Lymphatic:      Neck: No lymphadenopathy in the cervical or supraclavicular chain. Lab Results: No Electronic Results      Impressions: Rectal hemorrhage? ? Rectal hemorrhage? Assessment: Bleeding was likely due to anorectal source like hemorrhoids, but colonoscopy is indicated to rule out colorectal neoplasm  ? Bleeding was likely due to anorectal source like hemorrhoids, but colonoscopy is indicated to rule out colorectal neoplasm        Plan: Colonoscopy  Education on Colonoscopy Prep  Colonoscopy:  The nature and purpose of colonoscopy was explained to the patient in detail. Considerable time was spend discussing the risk and complications which include but are not limited to perforation of the intestinal track requiring surgery, bleeding reaction to medications, infection, the possibility of missing lesions due to preparation, allergic reactions to medication, risks of sedation and aesthesia, and unforeseen circumstances was explained to patient. The patient understands and accepts the risks and would like to proceed. ? ? Colonoscopy? ?  ? ? Education on Colonoscopy Prep? ?  ? ? Colonoscopy?: The nature and purpose of colonoscopy was explained to the patient in detail. Considerable time was spend discussing the risk and complications which include but are not limited to perforation of the intestinal track requiring surgery, bleeding reaction to medications, infection, the possibility of missing lesions due to preparation, allergic reactions to medication, risks of sedation and aesthesia, and unforeseen circumstances was explained to patient. The patient understands and accepts the risks and would like to proceed. Risk & Medical Necessity: Diagnosis and management options are Multiple. The amount of data reviewed and/or ordered is Moderate. The level of risk is Low.            Notes:              Vargas Varela MD     Electronically signed on 2018 10:18:26 AM by Vargas Varela MD                                 [facility], MRN [id number],  1935 First Visit,

## 2019-01-04 NOTE — ANESTHESIA POSTPROCEDURE EVALUATION
Procedure(s):  COLONOSCOPY  ENDOSCOPIC POLYPECTOMY.     Anesthesia Post Evaluation      Multimodal analgesia: multimodal analgesia used between 6 hours prior to anesthesia start to PACU discharge  Patient location during evaluation: bedside  Patient participation: complete - patient participated  Level of consciousness: awake  Pain management: adequate  Airway patency: patent  Anesthetic complications: no  Cardiovascular status: acceptable  Respiratory status: acceptable  Hydration status: acceptable        Visit Vitals  /51   Pulse (!) 57   Temp 36.5 °C (97.7 °F)   Resp 17   Ht 5' 2\" (1.575 m)   Wt 68 kg (150 lb)   SpO2 100%   BMI 27.44 kg/m²

## 2019-01-04 NOTE — DISCHARGE INSTRUCTIONS
[address] [facility] RAFI Sapp MD  [phone]      January 4, 2019    Ynes Razo  YOB: 1935    COLONOSCOPY DISCHARGE INSTRUCTIONS    If there is redness at IV site you should apply warm compress to area. If redness or soreness persist contact Dr. Chasidy Sapp' or your primary care doctor. There may be a slight amount of blood passed from the rectum. Gaseous discomfort may develop, but walking, belching will help relieve this. You may not operate a vehicle for 12 hours  You may not operate machinery or dangerous appliances for rest of today  You may not drink alcoholic beverages for 12 hours  Avoid making any critical decisions for 24 hours    DIET:  You may resume your normal diet, but some patients find that heavy or large meals may lead to indigestion or vomiting. I suggest a light meal as first food intake. MEDICATIONS:  The use of some over-the-counter pain medication may lead to bleeding after colon biopsies or polyp removal.  Tylenol (also called acetaminophen) is safe to take even if you have had colonoscopy with polyp removal.  Based on the procedure you had today you may not safely take aspirin or aspirin-like products for the next ten (10) days. Remember that Tylenol (also called acetaminophen) is safe to take after colonoscopy even if you have had biopsies or polyps removed. ACTIVITY:  You may resume your normal household activities, but it is recommended that you spend the remainder of the day resting -  avoid any strenuous activity. CALL DR. Mata Ybarra' OFFICE IF:  Increasing pain, nausea, vomiting  Abdominal distension (swelling)  Significant new or increased bleeding (oral or rectal)  Fever/Chills  Chest pain/shortness of breath                       Additional instructions:   Hold aspirin 10 days  Great news, no serious findings just one small polyp and hemorrhoids.   We removed the polyp and for the hemorrhoids be sure to get plenty of fiber in the diet. It was an honor to be your doctor today. Please let me or my office staff know if you have any feedback about today's procedure. Dante Weiner MD    Colonoscopy saves lives, and can prevent colon cancer. Everyone aged 48 or older needs colonoscopy.   Tell your family and friends: get the test!

## 2019-01-04 NOTE — ROUTINE PROCESS
[facility]  1935  [id number]    Situation:  Verbal report received from: Nicola Mujica RN  Procedure: Procedure(s):  COLONOSCOPY  ENDOSCOPIC POLYPECTOMY    Background:    Preoperative diagnosis: RECTAL HEMORRHAGE  Postoperative diagnosis: Colon-diverticulosis/hemorhoids polyp removal    :  Dr. Ciera Bonilla  Assistant(s): Endoscopy RN-1: Paco Abreu RN  Endoscopy RN-2: Dwaine Lyons RN    Specimens:   ID Type Source Tests Collected by Time Destination   1 : polyp Preservative Colon, Ascending  Mi Vale MD 1/4/2019 [facility]     H. Pylori  no    Assessment:  Intra-procedure medications     Anesthesia gave intra-procedure sedation and medications, see anesthesia flow sheet yes    Intravenous fluids: NS@ KVO     Vital signs stable     Abdominal assessment: round and soft     Recommendation:  Discharge patient per MD order. Family or Friend   Permission to share finding with family or friend yes    Endoscopy discharge instructions have been reviewed and given to patient and son. The patient and son verbalized understanding and acceptance of instructions.

## 2019-01-04 NOTE — INTERVAL H&P NOTE
Pre-Endoscopy H&P Update  Chief complaint/HPI/ROS:  The indication for the procedure, the patient's history and the patient's current medications are reviewed prior to the procedure and that data is reported on the H&P to which this document is attached. Any significant complaints with regard to organ systems will be noted, and if not mentioned then a review of systems is not contributory. Past Medical History:   Diagnosis Date    Arthritis     Gastrointestinal disorder     gerd    GERD (gastroesophageal reflux disease)     Ill-defined condition     cholesterol      Past Surgical History:   Procedure Laterality Date    HX BREAST BIOPSY  10-15 yrs ago    neg path, unsure which breast    HX COLONOSCOPY  2012    approx date, normal    HX ENDOSCOPY      no ulcers    HX ORTHOPAEDIC      right shoulder    HX WISDOM TEETH EXTRACTION       Social   Social History     Tobacco Use    Smoking status: Never Smoker    Smokeless tobacco: Never Used   Substance Use Topics    Alcohol use: Yes     Comment: mixed drink or wine 3-4x per week      Family History   Problem Relation Age of Onset    Hypertension Mother     Kidney Disease Mother         dialysis    No Known Problems Father     Cancer Brother         tongue, lung      Allergies   Allergen Reactions    Statins-Hmg-Coa Reductase Inhibitors Myalgia     Lots of different statins were tried      Prior to Admission Medications   Prescriptions Last Dose Informant Patient Reported? Taking? Biotin 2,500 mcg cap 1/3/2019 at Unknown time  Yes Yes   Sig: Take  by mouth daily. Calcium-Cholecalciferol, D3, 500 mg(1,250mg) -125 unit tab Not Taking at Unknown time  Yes No   Si per day   DOCOSAHEXANOIC ACID/EPA (FISH OIL PO) 2018 at Unknown time  Yes Yes   Sig: Take  by mouth. 2 tablet a day   acyclovir (ZOVIRAX) 400 mg tablet 1/3/2019 at Unknown time  No Yes   Sig: Take 1 Tab by mouth two (2) times a day.  To prevent cold sores   ibuprofen (MOTRIN) 600 mg tablet 12/4/2018 at Unknown time  No Yes   Sig: Take 1 Tab by mouth every six (6) hours as needed for Pain.   multivitamin (ONE A DAY) tablet 12/28/2018 at Unknown time  Yes Yes   Sig: Take 1 Tab by mouth daily. Takes up to 2 tabs on the 200 mg   omeprazole (PRILOSEC) 20 mg capsule 12/4/2018 at Unknown time  No Yes   Sig: Take 1 Cap by mouth daily. red yeast rice extract 600 mg cap 12/28/2018 at Unknown time  Yes Yes   Sig: Take 600 mg by mouth two (2) times a day. Facility-Administered Medications: None       PHYSICAL EXAM:  The patient is examined immediately prior to the procedure. Visit Vitals  /62   Pulse 62   Temp 98.1 °F (36.7 °C)   Resp 18   Ht 5' 2\" (1.575 m)   Wt 68 kg (150 lb)   SpO2 96%   BMI 27.44 kg/m²     Gen: Appears comfortable, no distress. Pulm: comfortable respirations with no abnormal audible breath sounds  HEART: well perfused, no abnormal audible heart sounds  GI: abdomen flat. PLAN:  Informed consent discussion held, patient afforded an opportunity to ask questions and all questions answered. After being advised of the risks, benefits, and alternatives, the patient requested that we proceed and indicated so on a written consent form. Will proceed with procedure as planned.   Johnnie Banerjee MD

## 2019-06-25 ENCOUNTER — OFFICE VISIT (OUTPATIENT)
Dept: FAMILY MEDICINE CLINIC | Age: 84
End: 2019-06-25

## 2019-06-25 VITALS
OXYGEN SATURATION: 97 % | TEMPERATURE: 98.2 F | SYSTOLIC BLOOD PRESSURE: 138 MMHG | HEIGHT: 62 IN | WEIGHT: 150.8 LBS | RESPIRATION RATE: 18 BRPM | DIASTOLIC BLOOD PRESSURE: 72 MMHG | HEART RATE: 55 BPM | BODY MASS INDEX: 27.75 KG/M2

## 2019-06-25 DIAGNOSIS — R41.3 MEMORY CHANGE: ICD-10-CM

## 2019-06-25 DIAGNOSIS — R26.89 BALANCE PROBLEM: ICD-10-CM

## 2019-06-25 DIAGNOSIS — E78.00 PURE HYPERCHOLESTEROLEMIA: ICD-10-CM

## 2019-06-25 DIAGNOSIS — M54.50 CHRONIC BILATERAL LOW BACK PAIN WITHOUT SCIATICA: ICD-10-CM

## 2019-06-25 DIAGNOSIS — Z00.00 MEDICARE ANNUAL WELLNESS VISIT, SUBSEQUENT: Primary | ICD-10-CM

## 2019-06-25 DIAGNOSIS — E78.5 HYPERLIPIDEMIA, MILD: ICD-10-CM

## 2019-06-25 DIAGNOSIS — G89.29 CHRONIC BILATERAL LOW BACK PAIN WITHOUT SCIATICA: ICD-10-CM

## 2019-06-25 DIAGNOSIS — M81.0 OSTEOPOROSIS OF FOREARM WITHOUT PATHOLOGICAL FRACTURE: ICD-10-CM

## 2019-06-25 DIAGNOSIS — K21.9 GASTROESOPHAGEAL REFLUX DISEASE WITHOUT ESOPHAGITIS: ICD-10-CM

## 2019-06-25 DIAGNOSIS — E03.8 SUBCLINICAL HYPOTHYROIDISM: ICD-10-CM

## 2019-06-25 NOTE — PROGRESS NOTES
Chief Complaint   Patient presents with   t Annual Wellness Visit       1. Have you been to the ER, urgent care clinic since your last visit? Hospitalized since your last visit? No    2. Have you seen or consulted any other health care providers outside of the [address] [place] [address] since your last visit? Include any pap smears or colon screening.  No

## 2019-06-25 NOTE — PATIENT INSTRUCTIONS
Medicare Wellness Visit, Female     The best way to live healthy is to have a lifestyle where you eat a well-balanced diet, exercise regularly, limit alcohol use, and quit all forms of tobacco/nicotine, if applicable. Regular preventive services are another way to keep healthy. Preventive services (vaccines, screening tests, monitoring & exams) can help personalize your care plan, which helps you manage your own care. Screening tests can find health problems at the earliest stages, when they are easiest to treat. [facility] follows the current, evidence-based guidelines published by the [facility] ([facility]) when recommending preventive services for our patients. Because we follow these guidelines, sometimes recommendations change over time as research supports it. (For example, mammograms used to be recommended annually. Even though Medicare will still pay for an annual mammogram, the newer guidelines recommend a mammogram every two years for women of average risk.)  Of course, you and your doctor may decide to screen more often for some diseases, based on your risk and your health status. Preventive services for you include:  - Medicare offers their members a free annual wellness visit, which is time for you and your primary care provider to discuss and plan for your preventive service needs. Take advantage of this benefit every year!  -All adults over the age of 72 should receive the recommended pneumonia vaccines. Current USPSTF guidelines recommend a series of two vaccines for the best pneumonia protection.   -All adults should have a flu vaccine yearly and a tetanus vaccine every 10 years. All adults age 61 and older should receive a shingles vaccine once in their lifetime.    -A bone mass density test is recommended when a woman turns 65 to screen for osteoporosis. This test is only recommended one time, as a screening.  Some providers will use this same test as a disease monitoring tool if you already have osteoporosis. -All adults age 38-68 who are overweight should have a diabetes screening test once every three years.   -Other screening tests and preventive services for persons with diabetes include: an eye exam to screen for diabetic retinopathy, a kidney function test, a foot exam, and stricter control over your cholesterol.   -Cardiovascular screening for adults with routine risk involves an electrocardiogram (ECG) at intervals determined by your doctor.   -Colorectal cancer screenings should be done for adults age 54-65 with no increased risk factors for colorectal cancer. There are a number of acceptable methods of screening for this type of cancer. Each test has its own benefits and drawbacks. Discuss with your doctor what is most appropriate for you during your annual wellness visit. The different tests include: colonoscopy (considered the best screening method), a fecal occult blood test, a fecal DNA test, and sigmoidoscopy. -Breast cancer screenings are recommended every other year for women of normal risk, age 54-69.  -Cervical cancer screenings for women over age 72 are only recommended with certain risk factors.   -All adults born between [facility] should be screened once for Hepatitis C.      Here is a list of your current Health Maintenance items (your personalized list of preventive services) with a due date:  Health Maintenance Due   Topic Date Due    Shingles Vaccine (1 of 2) 11/19/1985    Glaucoma Screening   11/19/2000    Pneumococcal Vaccine (1 of 2 - PCV13) 11/19/2000    Annual Well Visit  06/22/2019       I will order the bone density scan  To schedule an outpatient imaging test, please call:  [phone] [facility] scheduling)    You should have anywhere from 800-2000 units of vitamin D per day, but not more    Go back on a daily calcium pill      Discussed today Done Previously Not Needed    X -13 rx sent X -23  Pneumococcal vaccines    x Flu vaccine     x Hepatitis B vaccine (if at risk)   X rx sent   Shingles vaccine    x  TDAP vaccine    x  Mammogram     X normal Pap smear    x  Colorectal cancer screening     x Low-dose CT for lung cancer screening    x  Bone density test    X goes yearly  Glaucoma screening    x  Cholesterol test    x  Diabetes screening test      x Diabetes self-management class     x Nutritionist referral for diabetes or renal disease

## 2019-06-25 NOTE — PROGRESS NOTES
[facility]  [address] [address] [place], [address]  [phone]           Date of visit: 6/25/2019       This is an Subsequent Medicare Annual Wellness Visit (AWV), (Performed more than 12 months after effective date of Medicare Part B enrollment and 12 months after last preventive visit)    I have reviewed the patient's medical history in detail and updated the computerized patient record. Lyndsey Whelan is a 80 y.o. female   History obtained from: the patient.     Concerns today   (Patient understands that medical problems addressed today may incur additional cost as this is a preventive visit)    Feels like balance is not as good as it used to be  Has to hold on to railings more  Going to start strength training at [facility] and hopes that will help    Thinks memory is not as good  Family has not said anything  She has not forgotten anything important, other than can't remember if she got the vaccines I gave her rx's for last year  Just hard time thinking of names and words in conversation, says the same as her friends    Still taking care of her     Didn't tolerate reclast  Due to recheck dexa  On vit d but not calcium  Gets about 3/4c milk daily    Chronic back pain unchanged  Uses tylenol sometimes  Does home exercises  Can live with it    History     Patient Active Problem List   Diagnosis Code    Right arm and right face tingling R20.2    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    Pure hypercholesterolemia E78.00    Gastroesophageal reflux disease without esophagitis K21.9    Chronic bilateral low back pain without sciatica M54.5, G89.29    History of thyroid disease Z86.39    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 Radha Granados, no repeat recommended    HX BREAST BIOPSY  10-15 yrs ago    neg path, unsure which breast    HX COLONOSCOPY  01/01/2012    approx date, normal    HX ENDOSCOPY      no ulcers    HX ORTHOPAEDIC      right shoulder    HX WISDOM TEETH EXTRACTION       Allergies   Allergen Reactions    Statins-Hmg-Coa Reductase Inhibitors Myalgia     Lots of different statins were tried     Current Outpatient Medications   Medication Sig Dispense Refill    varicella-zoster recombinant, PF, (SHINGRIX, PF,) 50 mcg/0.5 mL susr injection 0.5 mL by IntraMUSCular route once for 1 dose. 0.5 mL 1    pneumococcal 13 vincent conj dip (PREVNAR-13) 0.5 mL syrg injection 0.5 mL by IntraMUSCular route once for 1 dose. 0.5 mL 0    acyclovir (ZOVIRAX) 400 mg tablet TAKE 1 TABLET BY MOUTH 2 TIMES DAILY. TO PREVENT COLD SORES 180 Tab 0    Biotin 2,500 mcg cap Take  by mouth daily.  ibuprofen (MOTRIN) 600 mg tablet Take 1 Tab by mouth every six (6) hours as needed for Pain. 120 Tab 1    multivitamin (ONE A DAY) tablet Take 1 Tab by mouth daily. Takes up to 2 tabs on the 200 mg      omeprazole (PRILOSEC) 20 mg capsule Take 1 Cap by mouth daily. 90 Cap 3    DOCOSAHEXANOIC ACID/EPA (FISH OIL PO) Take  by mouth. 2 tablet a day      red yeast rice extract 600 mg cap Take 600 mg by mouth two (2) times a day.       Calcium-Cholecalciferol, D3, 500 mg(1,250mg) -125 unit tab 1 per day       Family History   Problem Relation Age of Onset    Hypertension Mother     Kidney Disease Mother         dialysis    No Known Problems Father     Cancer Brother         tongue, lung     Social History     Tobacco Use    Smoking status: Never Smoker    Smokeless tobacco: Never Used   Substance Use Topics    Alcohol use: Yes     Comment: mixed drink or wine 3-4x per week       Specialists/Care Team   Vika Lui has established care with the following healthcare providers:  Patient Care Team:  Ilya Sebastian MD as PCP - General (Family Practice)  Maxi Patel MD (Dermatology)  Vasu Thompson someone is her eye doctor    Health Risk Assessment     Demographics   female  80 y.o. General Health Questions   -During the past 4 weeks:   -how would you rate your health in general? Good   -how often have you been bothered by feeling dizzy when standing up? never   -how much have you been bothered by bodily pain? Back mainly with arthritis for years   -Have you noticed any hearing difficulties? Yes, has hearing aids   -has your physical and emotional health limited your social activities with family or friends? No, just being caregiver    Emotional Health Questions   -Do you have a history of depression, anxiety, or emotional problems? no  3 most recent PHQ Screens 6/25/2019   Little interest or pleasure in doing things Not at all   Feeling down, depressed, irritable, or hopeless Not at all   Total Score PHQ 2 0      Health Habits   Please describe your diet habits: eats pretty well, still the fruit smoothie with protein powder, and too many snack foods  Do you get 5 servings of fruits or vegetables daily? Yes, or close to it, more fruits than veggies  Do you exercise regularly? Yes, pickle ball 3x per week and just got into [employer] through silver sneakers, met with     Activities of Daily Living and Functional Status   -Do you need help with eating, walking, dressing, bathing, toileting, the phone, transportation, shopping, preparing meals, housework, laundry, medications or managing money? no  -In the past four weeks, was someone available to help you if you needed and wanted help with anything? yes  -Are you confident are you that you can control and manage most of your health problems? yes  -Have you been given information to help you keep track of your medications?  yes  -How often do you have trouble taking your medications as prescribed? never    Fall Risk and Home Safety   Have you fallen 2 or more times in the past year? Yes, slipped a couple of times while playing pickle ball  Does your home have rugs in the hallway, lack grab bars in the bathroom, lack handrails on the stairs or have poor lighting? no  Do you have smoke detectors and check them regularly? yes  Do you have difficulties driving a car? no  Do you always fasten your seat belt when you are in a car? yes    Review of Systems (if indicated for problems addressed today)   Card: denies chest pain  Pulm: denies shortness of breath  GI: denies hematochezia    Physical Examination     Vitals:    06/25/19 1107 06/25/19 1143   BP: 140/67 138/72   Pulse: (!) 55    Resp: 18    Temp: 98.2 °F (36.8 °C)    TempSrc: Oral    SpO2: 97%    Weight: 150 lb 12.8 oz (68.4 kg)    Height: 5' 2\" (1.575 m)      Body mass index is 27.58 kg/m². No exam data present  Was the patient's timed Up & Go test unsteady or longer than 30 seconds? no    Evaluation of Cognitive Function   Mood/affect:  happy  Orientation: normal x3  Appearance: age appropriate  Family member/caregiver input: none    Additional exam if indicated for problems addressed today:  General: stated age, well-developed, and in NAD  Eyes: PERRL, EOMI, no redness or drainage  Nose: no drainage  Mouth: no lesions  Throat: no erythema, exudate or swelling  Neck: supple, symmetrical, trachea midline, no adenopathy and thyroid: not enlarged, symmetric, no tenderness/mass/nodules  Lungs:  clear to auscultation w/o rales, rhonchi, wheezes w/normal effort and no use of accessory muscles of respiration   Heart: regular rate and rhythm, S1, S2 normal, no murmur, click, rub or gallop  Abdomen: soft, nontender, no masses  Ext:  No edema noted.    Lymph: no cervical adenopathy appreciated  Skin:  Normal. and no rash or abnormalities   Neuro: normal gait, CN 2-12 intact  Psych: alert and oriented to person, place, time and situation and Speech: appropriate quality, quantity and organization of sentences    Advice/Referrals/Counseling (as indicated)   Education and counseling provided for any problems identified above: diet, exercise    Preventive Services   (Preventive care checklist to be included in patient instructions)  Discussed today Done Previously Not Needed    X -13 rx sent X -23  Pneumococcal vaccines    x  Flu vaccine     x Hepatitis B vaccine (if at risk)   X rx sent   Shingles vaccine    x  TDAP vaccine    x  Mammogram     X normal Pap smear    x  Colorectal cancer screening     x Low-dose CT for lung cancer screening   X order recheck X 2016  Bone density test    X goes yearly  Glaucoma screening    x  Cholesterol test    x  Diabetes screening test      x Diabetes self-management class     x Nutritionist referral for diabetes or renal disease     Discussion of Advance Directive   Discussed with Archie Chidi her ability to prepare and advance directive in the case that an injury or illness causes her to be unable to make health care decisions. Has one, will bring us a record    Assessment/Plan   Z00.00    ICD-10-CM ICD-9-CM    1. Medicare annual wellness visit, subsequent Z00.00 V70.0    2. Subclinical hypothyroidism E03.9 244.8 T4, FREE      TSH 3RD GENERATION   3. 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 Pure hypercholesterolemia E78.00 272.0    5. Hyperlipidemia, mild G87.8 491.1 METABOLIC PANEL, COMPREHENSIVE      LIPID PANEL   6. Gastroesophageal reflux disease without esophagitis K21.9 530.81 CBC WITH AUTOMATED DIFF   7. Balance problem R26.89 781.99    8. Memory change R41.3 780.93    9.  Chronic bilateral low back pain without sciatica M54.5 724.2     G89.29 338.29         Orders Placed This Encounter    DEXA BONE DENSITY STUDY AXIAL    T4, FREE    TSH 3RD GENERATION    CBC WITH AUTOMATED DIFF    METABOLIC PANEL, COMPREHENSIVE    LIPID PANEL    varicella-zoster recombinant, PF, (SHINGRIX, PF,) 50 mcg/0.5 mL susr injection    pneumococcal 13 vincent conj dip (PREVNAR-13) 0.5 mL syrg injection     Overall very healthy for her age; preventive care as above  Not sure why more off balance but she thinks maybe age, probably right  Not in as good of shape as she used to be as her son does her yardwork now  She will try the exercise routine given by  at [facility], if that doesn't help may benefit from PT  Recheck labs as above  Very healthy lifestyle, encouraged her to keep up the good work  I think her memory is ok but will continue to monitor. Family has not mentioned a problem. Discussed lifestyle measures to help memory and she is doing those (exercise, playing carbs, diet, etc)  gerd doing well recently  Osteoporosis due for recheck and will consider Prolia as she could not tolerate the Reclast she got once (and can't take oral bisphosphonates due to reflux)  Will continue her home stretches for her back, which is chronic problem but not worse, occasional tylenol as well    Follow-up and Dispositions    · Return in about 1 year (around 6/25/2020) for Annual Wellness Visit.          James Watts MD

## 2019-06-26 LAB
ALBUMIN SERPL-MCNC: 4.3 G/DL (ref 3.5–4.7)
ALBUMIN/GLOB SERPL: 1.9 {RATIO} (ref 1.2–2.2)
ALP SERPL-CCNC: 72 IU/L (ref 39–117)
ALT SERPL-CCNC: 14 IU/L (ref 0–32)
AST SERPL-CCNC: 20 IU/L (ref 0–40)
BASOPHILS # BLD AUTO: 0 X10E3/UL (ref 0–0.2)
BASOPHILS NFR BLD AUTO: 0 %
BILIRUB SERPL-MCNC: 0.5 MG/DL (ref 0–1.2)
BUN SERPL-MCNC: 14 MG/DL (ref 8–27)
BUN/CREAT SERPL: 15 (ref 12–28)
CALCIUM SERPL-MCNC: 9.7 MG/DL (ref 8.7–10.3)
CHLORIDE SERPL-SCNC: 105 MMOL/L (ref 96–106)
CHOLEST SERPL-MCNC: 229 MG/DL (ref 100–199)
CO2 SERPL-SCNC: 23 MMOL/L (ref 20–29)
CREAT SERPL-MCNC: 0.93 MG/DL (ref 0.57–1)
EOSINOPHIL # BLD AUTO: 0.1 X10E3/UL (ref 0–0.4)
EOSINOPHIL NFR BLD AUTO: 1 %
ERYTHROCYTE [DISTWIDTH] IN BLOOD BY AUTOMATED COUNT: 14.3 % (ref 12.3–15.4)
GLOBULIN SER CALC-MCNC: 2.3 G/DL (ref 1.5–4.5)
GLUCOSE SERPL-MCNC: 94 MG/DL (ref 65–99)
HCT VFR BLD AUTO: 40.9 % (ref 34–46.6)
HDLC SERPL-MCNC: 58 MG/DL
HGB BLD-MCNC: 13.5 G/DL (ref 11.1–15.9)
IMM GRANULOCYTES # BLD AUTO: 0 X10E3/UL (ref 0–0.1)
IMM GRANULOCYTES NFR BLD AUTO: 0 %
INTERPRETATION, 910389: NORMAL
INTERPRETATION: NORMAL
LDLC SERPL CALC-MCNC: 137 MG/DL (ref 0–99)
LYMPHOCYTES # BLD AUTO: 1.5 X10E3/UL (ref 0.7–3.1)
LYMPHOCYTES NFR BLD AUTO: 25 %
MCH RBC QN AUTO: 29.1 PG (ref 26.6–33)
MCHC RBC AUTO-ENTMCNC: 33 G/DL (ref 31.5–35.7)
MCV RBC AUTO: 88 FL (ref 79–97)
MONOCYTES # BLD AUTO: 0.5 X10E3/UL (ref 0.1–0.9)
MONOCYTES NFR BLD AUTO: 9 %
NEUTROPHILS # BLD AUTO: 3.7 X10E3/UL (ref 1.4–7)
NEUTROPHILS NFR BLD AUTO: 65 %
PDF IMAGE, 910387: NORMAL
PLATELET # BLD AUTO: 283 X10E3/UL (ref 150–450)
POTASSIUM SERPL-SCNC: 4.6 MMOL/L (ref 3.5–5.2)
PROT SERPL-MCNC: 6.6 G/DL (ref 6–8.5)
RBC # BLD AUTO: 4.64 X10E6/UL (ref 3.77–5.28)
SODIUM SERPL-SCNC: 141 MMOL/L (ref 134–144)
T4 FREE SERPL-MCNC: 1.15 NG/DL (ref 0.82–1.77)
TRIGL SERPL-MCNC: 171 MG/DL (ref 0–149)
TSH SERPL DL<=0.005 MIU/L-ACNC: 4.51 UIU/ML (ref 0.45–4.5)
VLDLC SERPL CALC-MCNC: 34 MG/DL (ref 5–40)
WBC # BLD AUTO: 5.8 X10E3/UL (ref 3.4–10.8)

## 2019-07-25 ENCOUNTER — HOSPITAL ENCOUNTER (OUTPATIENT)
Dept: MAMMOGRAPHY | Age: 84
Discharge: HOME | End: 2019-07-25
Attending: FAMILY MEDICINE
Payer: MEDICARE

## 2019-07-25 DIAGNOSIS — M81.0 OSTEOPOROSIS OF FOREARM WITHOUT PATHOLOGICAL FRACTURE: ICD-10-CM

## 2019-07-25 PROCEDURE — 77080 DXA BONE DENSITY AXIAL: CPT

## 2019-08-08 ENCOUNTER — HOSPITAL ENCOUNTER (OUTPATIENT)
Dept: MAMMOGRAPHY | Age: 84
Discharge: HOME | End: 2019-08-08
Attending: FAMILY MEDICINE
Payer: MEDICARE

## 2019-08-08 DIAGNOSIS — Z12.39 BREAST SCREENING: ICD-10-CM

## 2019-08-08 PROCEDURE — 77067 SCR MAMMO BI INCL CAD: CPT

## 2019-09-07 RX ORDER — IBUPROFEN 600 MG/1
TABLET ORAL
Qty: 120 TAB | Refills: 0 | Status: SHIPPED | OUTPATIENT
Start: 2019-09-07 | End: 2020-09-06

## 2020-09-06 RX ORDER — IBUPROFEN 600 MG/1
TABLET ORAL
Qty: 120 TAB | Refills: 0 | Status: SHIPPED | OUTPATIENT
Start: 2020-09-06

## 2020-09-14 NOTE — PROGRESS NOTES
Jesús Christie [facility]  [address] [place], [address]  [phone]           Date of visit: 9/15/2020       This is an Subsequent Medicare Annual Wellness Visit (AWV), (Performed more than 12 months after effective date of Medicare Part B enrollment and 12 months after last preventive visit)    I have reviewed the patient's medical history in detail and updated the computerized patient record. Solange Higginbotham is a 80 y.o. female   History obtained from: the patient.     Concerns today   (Patient understands that medical problems addressed today may incur additional cost as this is a preventive visit)  Chief Complaint   Patient presents with    Annual Wellness Visit     -Arthritis pretty bad in both thumbs, yohannes right one  Denies stiffness or swelling    Chronic back pain unchanged  Uses ibuprofen infrequently and does home exercises    Had one dose reclast and doesn't recall having side effects now  Looking back her knee was hurting some afterward and later asked if it affected her teeth  Willing to get another one now    GERD on prilosec prn, hasn't needed it for a while    Memory she still thinks is getting worse  Takes her longer to remember the steps to do things, makes her frustrated with herself  Patsy Carrillo to remember how to do things on the computer, will have to call her son in to help her.  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    Pure hypercholesterolemia E78.00    Gastroesophageal reflux disease without esophagitis K21.9    Chronic bilateral low back pain without sciatica M54.5, G89.29    History of thyroid disease Z86.39    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 Gia Simms, no repeat recommended    HX BREAST BIOPSY  10-15 yrs ago    neg path, unsure which breast    HX COLONOSCOPY  01/01/2012    approx date, normal    HX ENDOSCOPY      no ulcers    HX ORTHOPAEDIC      right shoulder    HX WISDOM TEETH EXTRACTION       Allergies   Allergen Reactions    Statins-Hmg-Coa Reductase Inhibitors Myalgia     Lots of different statins were tried     Current Outpatient Medications   Medication Sig Dispense Refill    diclofenac (VOLTAREN) 1 % gel Apply 1 g to affected area four (4) times daily. 50 g 2    mirtazapine (REMERON) 7.5 mg tablet Take 1 Tab by mouth nightly. 30 Tab 1    ibuprofen (MOTRIN) 600 mg tablet TAKE ONE TABLET BY MOUTH EVERY SIX HOURS AS NEEDED for pain 120 Tab 0    acyclovir (ZOVIRAX) 400 mg tablet TAKE ONE TABLET BY MOUTH TWICE DAILY TO PREVENT COLD SORES 180 Tab 2    Biotin 2,500 mcg cap Take  by mouth daily.  Calcium-Cholecalciferol, D3, 500 mg(1,250mg) -125 unit tab 1 per day      multivitamin (ONE A DAY) tablet Take 1 Tab by mouth daily. Takes up to 2 tabs on the 200 mg      omeprazole (PRILOSEC) 20 mg capsule Take 1 Cap by mouth daily. 90 Cap 3    DOCOSAHEXANOIC ACID/EPA (FISH OIL PO) Take  by mouth. 2 tablet a day      red yeast rice extract 600 mg cap Take 600 mg by mouth two (2) times a day.        Family History   Problem Relation Age of Onset    Hypertension Mother     Kidney Disease Mother         dialysis    No Known Problems Father     Cancer Brother         tongue, lung     Social History     Tobacco Use    Smoking status: Never Smoker    Smokeless tobacco: Never Used   Substance Use Topics    Alcohol use: Yes     Comment: mixed drink or wine 3-4x per week       Specialists/Care Team   Moreno Mary Kate has established care with the following healthcare providers:  Patient Care Team:  Petty Clement MD as PCP - General (Family Medicine)  Petty Clement MD as PCP - [facility] Empaneled Provider  Jessica Jones MD (Dermatology)  Kathie Mayfield someone is her eye doctor    Health Risk Assessment     Demographics   female  80 y.o. General Health Questions   -During the past 4 weeks:   -how would you rate your health in general? Good   -how often have you been bothered by feeling dizzy when standing up? never although might feel a little off balance when walking in uneven yard. No problems with playing pickle ball   -how much have you been bothered by bodily pain? Back mainly with arthritis for years    -Have you noticed any hearing difficulties? Yes, has hearing aids   -has your physical and emotional health limited your social activities with family or friends? No, just being caregiver     Emotional Health Questions   -Do you have a history of depression, anxiety, or emotional problems? no  3 most recent PHQ Screens 9/15/2020   Little interest or pleasure in doing things Not at all   Feeling down, depressed, irritable, or hopeless Not at all   Total Score PHQ 2 0      Health Habits   Please describe your diet habits: eats pretty well, still the fruit smoothie with protein powder every morning  Do you get 5 servings of fruits or vegetables daily? Yes   Do you exercise regularly?  Yes, pickle ball 3x per week; used to go gym before COVID, can always walk    Activities of Daily Living and Functional Status   -Do you need help with eating, walking, dressing, bathing, toileting, the phone, transportation, shopping, preparing meals, housework, laundry, medications or managing money? No   -In the past four weeks, was someone available to help you if you needed and wanted help with anything? yes  -Are you confident are you that you can control and manage most of your health problems? yes  -Have you been given information to help you keep track of your medications? yes  -How often do you have trouble taking your medications as prescribed? never    Fall Risk and Home Safety   Have you fallen 2 or more times in the past year? no  Does your home have rugs in the hallway, lack grab bars in the bathroom, lack handrails on the stairs or have poor lighting? no  Do you have smoke detectors and check them regularly? yes  Do you have difficulties driving a car? No   Do you always fasten your seat belt when you are in a car? yes    Review of Systems (if indicated for problems addressed today)   Card: denies chest pain  Pulm: denies shortness of breath  GI: denies hematochezia    Physical Examination     Vitals:    09/15/20 1536   BP: 102/60   Pulse: 91   Resp: 18   SpO2: 98%   Weight: 150 lb (68 kg)     Body mass index is 27.44 kg/m². No exam data present  Was the patient's timed Up & Go test unsteady or longer than 30 seconds? No     Evaluation of Cognitive Function   Mood/affect:  neutral  Orientation: normal to date, month, year, city, person, situation.   Appearance: age appropriate  Family member/caregiver input: none    Additional exam if indicated for problems addressed today:  General: stated age, well-developed, and in NAD  Eyes: PERRL, EOMI, no redness or drainage  Nose: no drainage  Mouth: no lesions  Throat: no erythema, exudate or swelling  Neck: supple, symmetrical, trachea midline, no adenopathy and thyroid: not enlarged, symmetric, no tenderness/mass/nodules  Lungs:  clear to auscultation w/o rales, rhonchi, wheezes w/normal effort and no use of accessory muscles of respiration   Heart: regular rate and rhythm, S1, S2 normal, no murmur, click, rub or gallop  Abdomen: soft, nontender, no masses  Ext:  No edema noted. Lymph: no cervical adenopathy appreciated  Skin:  Normal. and no rash or abnormalities   Neuro: normal gait, CN 2-12 intact  Psych: alert and oriented to person, place, time and situation and Speech: appropriate quality, quantity and organization of sentences    Advice/Referrals/Counseling (as indicated)   Education and counseling provided for any problems identified above: diet, exercise    Preventive Services   (Preventive care checklist to be included in patient instructions)  Discussed today Done Previously Not Needed     X -23  Pneumococcal vaccines   X today x  Flu vaccine     x Hepatitis B vaccine (if at risk)   X encouraged, she needs 2nd one Had first one at [facility]  Shingles vaccine    X 2015  TDAP vaccine    x  Mammogram     X normal Pap smear    x  Colorectal cancer screening     x Low-dose CT for lung cancer screening    X stable 2019, has had 1 dose Reclast  Bone density test    X 1 year ago  Glaucoma screening    x  Cholesterol test    x  Diabetes screening test      x Diabetes self-management class     x Nutritionist referral for diabetes or renal disease     Discussion of Advance Directive   Discussed with Michele Roberts her ability to prepare and advance directive in the case that an injury or illness causes her to be unable to make health care decisions. Has one on file, no changes    Assessment/Plan   Z00.00    ICD-10-CM ICD-9-CM    1. Medicare annual wellness visit, subsequent  Z00.00 V70.0    2. Osteoporosis of forearm without pathological fracture  M81.0 733.00    3. Primary insomnia  F51.01 307.42    4. Caregiver burden  Z63.6 V61.49    5. Subclinical hypothyroidism  E03.9 244.8    6. Gastroesophageal reflux disease without esophagitis  K21.9 530.81    7.  Chronic bilateral low back pain without sciatica  M54.5 724.2 G89.29 338.29    8. Hyperlipidemia, mild  E78.5 272.4    9. Encounter for immunization  Z23 V03.89 FLU (FLUAD QUAD INFLUENZA VACCINE,QUAD,ADJUVANTED)      ADMIN INFLUENZA VIRUS VAC        Orders Placed This Encounter    FLU (FLUAD QUAD INFLUENZA VACCINE,QUAD,ADJUVANTED)    ADMIN INFLUENZA VIRUS VAC    diclofenac (VOLTAREN) 1 % gel    mirtazapine (REMERON) 7.5 mg tablet     Preventive as above  Willing to get reclast #2 as any side effects she had were minimal; will refer  Will need labs at infusion center for that but not otherwise as she is feeling well and not on meds that require monitoring. Back pain chronic unchanged but lives with it  Will try voltaren for bilateral base of thumb pains almost certainly OA; holding off on xrays due to lack of inflammatory features  She is stressed with caregiving and not sleeping well. Will try low dose remeron, update me on portal soon; may need higher dose    Follow-up and Dispositions    · Return in about 1 year (around 9/15/2021).          Vargas Jolley MD

## 2020-09-14 NOTE — PATIENT INSTRUCTIONS
Try mirtazepine nightly for sleep, stress  Please update me in a few days on the MyChart. Take it 30-60 minutes before bed    Someone should call you to schedule your second Reclast infusion for your bones. Well Visit, Over 72: Care Instructions  Your Care Instructions     Physical exams can help you stay healthy. Your doctor has checked your overall health and may have suggested ways to take good care of yourself. He or she also may have recommended tests. At home, you can help prevent illness with healthy eating, regular exercise, and other steps. Follow-up care is a key part of your treatment and safety. Be sure to make and go to all appointments, and call your doctor if you are having problems. It's also a good idea to know your test results and keep a list of the medicines you take. How can you care for yourself at home? · Reach and stay at a healthy weight. This will lower your risk for many problems, such as obesity, diabetes, heart disease, and high blood pressure. · Get at least 30 minutes of exercise on most days of the week. Walking is a good choice. You also may want to do other activities, such as running, swimming, cycling, or playing tennis or team sports. · Do not smoke. Smoking can make health problems worse. If you need help quitting, talk to your doctor about stop-smoking programs and medicines. These can increase your chances of quitting for good. · Protect your skin from too much sun. When you're outdoors from 10 a.m. to 4 p.m., stay in the shade or cover up with clothing and a hat with a wide brim. Wear sunglasses that block UV rays. Even when it's cloudy, put broad-spectrum sunscreen (SPF 30 or higher) on any exposed skin. · See a dentist one or two times a year for checkups and to have your teeth cleaned. · Wear a seat belt in the car. Follow your doctor's advice about when to have certain tests. These tests can spot problems early. For men and women  · Cholesterol.  Your doctor will tell you how often to have this done based on your overall health and other things that can increase your risk for heart attack and stroke. · Blood pressure. Have your blood pressure checked during a routine doctor visit. Your doctor will tell you how often to check your blood pressure based on your age, your blood pressure results, and other factors. · Diabetes. Ask your doctor whether you should have tests for diabetes. · Vision. Experts recommend that you have yearly exams for glaucoma and other age-related eye problems. · Hearing. Tell your doctor if you notice any change in your hearing. You can have tests to find out how well you hear. · Colon cancer tests. Keep having colon cancer tests as your doctor recommends. You can have one of several types of tests. · Heart attack and stroke risk. At least every 4 to 6 years, you should have your risk for heart attack and stroke assessed. Your doctor uses factors such as your age, blood pressure, cholesterol, and whether you smoke or have diabetes to show what your risk for a heart attack or stroke is over the next 10 years. · Osteoporosis. Talk to your doctor about whether you should have a bone density test to find out whether you have thinning bones. Ask your doctor if you need to take a calcium plus vitamin D supplement. You may be able to get enough calcium and vitamin D through your diet. For women  · Pap test and pelvic exam. You may no longer need a Pap test. Talk with your doctor about whether to stop or continue to have Pap tests. · Breast exam and mammogram. Ask how often you should have a mammogram, which is an X-ray of your breasts. A mammogram can spot breast cancer before it can be felt and when it is easiest to treat. · Thyroid disease. Talk to your doctor about whether to have your thyroid checked as part of a regular physical exam. Women have an increased chance of a thyroid problem.   For men  · Prostate exam. Talk to your doctor about whether you should have a blood test (called a PSA test) for prostate cancer. Experts recommend that you discuss the benefits and risks of the test with your doctor before you decide whether to have this test. Some experts say that men ages 79 and older no longer need testing. · Abdominal aortic aneurysm. Ask your doctor whether you should have a test to check for an aneurysm. You may need a test if you ever smoked or if your parent, brother, sister, or child has had an aneurysm. When should you call for help? Watch closely for changes in your health, and be sure to contact your doctor if you have any problems or symptoms that concern you. Where can you learn more? Go to http://anne-brian.info/  Enter [id number] in the search box to learn more about \"Well Visit, Over 65: Care Instructions. \"  Current as of: May 27, 2020               Content Version: 12.6  © 5208-6030 [employer]. Care instructions adapted under license by [employer] (which disclaims liability or warranty for this information). If you have questions about a medical condition or this instruction, always ask your healthcare professional. [address] any warranty or liability for your use of this information.

## 2020-09-15 ENCOUNTER — OFFICE VISIT (OUTPATIENT)
Dept: FAMILY MEDICINE CLINIC | Age: 85
End: 2020-09-15
Payer: MEDICARE

## 2020-09-15 VITALS
RESPIRATION RATE: 18 BRPM | OXYGEN SATURATION: 98 % | WEIGHT: 150 LBS | SYSTOLIC BLOOD PRESSURE: 102 MMHG | HEART RATE: 91 BPM | BODY MASS INDEX: 27.44 KG/M2 | DIASTOLIC BLOOD PRESSURE: 60 MMHG

## 2020-09-15 DIAGNOSIS — E03.8 SUBCLINICAL HYPOTHYROIDISM: ICD-10-CM

## 2020-09-15 DIAGNOSIS — M81.0 OSTEOPOROSIS OF FOREARM WITHOUT PATHOLOGICAL FRACTURE: ICD-10-CM

## 2020-09-15 DIAGNOSIS — Z23 ENCOUNTER FOR IMMUNIZATION: ICD-10-CM

## 2020-09-15 DIAGNOSIS — Z63.6 CAREGIVER BURDEN: ICD-10-CM

## 2020-09-15 DIAGNOSIS — G89.29 CHRONIC BILATERAL LOW BACK PAIN WITHOUT SCIATICA: ICD-10-CM

## 2020-09-15 DIAGNOSIS — K21.9 GASTROESOPHAGEAL REFLUX DISEASE WITHOUT ESOPHAGITIS: ICD-10-CM

## 2020-09-15 DIAGNOSIS — M54.50 CHRONIC BILATERAL LOW BACK PAIN WITHOUT SCIATICA: ICD-10-CM

## 2020-09-15 DIAGNOSIS — E78.5 HYPERLIPIDEMIA, MILD: ICD-10-CM

## 2020-09-15 DIAGNOSIS — Z00.00 MEDICARE ANNUAL WELLNESS VISIT, SUBSEQUENT: Primary | ICD-10-CM

## 2020-09-15 DIAGNOSIS — F51.01 PRIMARY INSOMNIA: ICD-10-CM

## 2020-09-15 PROCEDURE — G0444 DEPRESSION SCREEN ANNUAL: HCPCS | Performed by: FAMILY MEDICINE

## 2020-09-15 PROCEDURE — G8510 SCR DEP NEG, NO PLAN REQD: HCPCS | Performed by: FAMILY MEDICINE

## 2020-09-15 PROCEDURE — 90694 VACC AIIV4 NO PRSRV 0.5ML IM: CPT

## 2020-09-15 PROCEDURE — 99214 OFFICE O/P EST MOD 30 MIN: CPT | Performed by: FAMILY MEDICINE

## 2020-09-15 PROCEDURE — 1101F PT FALLS ASSESS-DOCD LE1/YR: CPT | Performed by: FAMILY MEDICINE

## 2020-09-15 PROCEDURE — G8427 DOCREV CUR MEDS BY ELIG CLIN: HCPCS | Performed by: FAMILY MEDICINE

## 2020-09-15 PROCEDURE — G8419 CALC BMI OUT NRM PARAM NOF/U: HCPCS | Performed by: FAMILY MEDICINE

## 2020-09-15 PROCEDURE — G0439 PPPS, SUBSEQ VISIT: HCPCS | Performed by: FAMILY MEDICINE

## 2020-09-15 PROCEDURE — G8536 NO DOC ELDER MAL SCRN: HCPCS | Performed by: FAMILY MEDICINE

## 2020-09-15 PROCEDURE — G0008 ADMIN INFLUENZA VIRUS VAC: HCPCS | Performed by: FAMILY MEDICINE

## 2020-09-15 PROCEDURE — 1090F PRES/ABSN URINE INCON ASSESS: CPT | Performed by: FAMILY MEDICINE

## 2020-09-15 RX ORDER — DICLOFENAC SODIUM 10 MG/G
1 GEL TOPICAL 4 TIMES DAILY
Qty: 50 G | Refills: 2 | Status: SHIPPED | OUTPATIENT
Start: 2020-09-15

## 2020-09-15 RX ORDER — MIRTAZAPINE 7.5 MG/1
7.5 TABLET, FILM COATED ORAL
Qty: 30 TAB | Refills: 1 | Status: SHIPPED | OUTPATIENT
Start: 2020-09-15

## 2020-09-15 SDOH — SOCIAL STABILITY - SOCIAL INSECURITY: DEPENDENT RELATIVE NEEDING CARE AT HOME: Z63.6

## 2020-09-15 NOTE — PROGRESS NOTES
Chief Complaint   Patient presents with   Frannie Annual Wellness Visit     1. Have you been to the ER, urgent care clinic since your last visit? Hospitalized since your last visit? No    2. Have you seen or consulted any other health care providers outside of the [address] [place] [address] since your last visit? Include any pap smears or colon screening.  No

## 2020-09-16 ENCOUNTER — TELEPHONE (OUTPATIENT)
Dept: FAMILY MEDICINE CLINIC | Age: 85
End: 2020-09-16

## 2020-09-29 RX ORDER — ZOLEDRONIC ACID 5 MG/100ML
5 INJECTION, SOLUTION INTRAVENOUS ONCE
Status: COMPLETED | OUTPATIENT
Start: 2020-10-06 | End: 2020-10-06

## 2020-10-06 ENCOUNTER — HOSPITAL ENCOUNTER (OUTPATIENT)
Dept: INFUSION THERAPY | Age: 85
Discharge: HOME | End: 2020-10-06
Payer: MEDICARE

## 2020-10-06 VITALS
BODY MASS INDEX: 27.05 KG/M2 | WEIGHT: 147.9 LBS | HEART RATE: 68 BPM | OXYGEN SATURATION: 95 % | TEMPERATURE: 98 F | DIASTOLIC BLOOD PRESSURE: 69 MMHG | RESPIRATION RATE: 18 BRPM | SYSTOLIC BLOOD PRESSURE: 121 MMHG

## 2020-10-06 LAB
ANION GAP BLD CALC-SCNC: 19 MMOL/L (ref 10–20)
BUN BLD-MCNC: 25 MG/DL (ref 9–20)
CA-I BLD-MCNC: 1.24 MMOL/L (ref 1.12–1.32)
CHLORIDE BLD-SCNC: 100 MMOL/L (ref 98–107)
CO2 BLD-SCNC: 25 MMOL/L (ref 21–32)
CREAT BLD-MCNC: 1 MG/DL (ref 0.6–1.3)
GLUCOSE BLD-MCNC: 136 MG/DL (ref 65–100)
HCT VFR BLD CALC: 42 % (ref 35–47)
POTASSIUM BLD-SCNC: 3.6 MMOL/L (ref 3.5–5.1)
SERVICE CMNT-IMP: ABNORMAL
SODIUM BLD-SCNC: 139 MMOL/L (ref 136–145)

## 2020-10-06 PROCEDURE — 80047 BASIC METABLC PNL IONIZED CA: CPT

## 2020-10-06 PROCEDURE — 74011250636 HC RX REV CODE- 250/636: Performed by: FAMILY MEDICINE

## 2020-10-06 PROCEDURE — 96374 THER/PROPH/DIAG INJ IV PUSH: CPT

## 2020-10-06 RX ORDER — SODIUM CHLORIDE 9 MG/ML
25 INJECTION, SOLUTION INTRAVENOUS AS NEEDED
Status: DISCONTINUED | OUTPATIENT
Start: 2020-10-06 | End: 2020-10-07 | Stop reason: HOSPADM

## 2020-10-06 RX ADMIN — SODIUM CHLORIDE 25 ML/HR: 900 INJECTION, SOLUTION INTRAVENOUS at 15:23

## 2020-10-06 RX ADMIN — ZOLEDRONIC ACID 5 MG: 5 INJECTION, SOLUTION INTRAVENOUS at 15:24

## 2020-10-06 NOTE — PROGRESS NOTES
Outpatient Infusion Center Progress Note    Pt admit to [facility] for Reclast ambulatory in stable condition. Assessment completed. No new concerns voiced. Peripheral Iv 24 g established in left arm with positive blood return. Labs obtained and sent for processing. I-stat performed. Results are within parameters to treat. Patient declined having any recent invasive dental work within the last 6-8 weeks. Visit Vitals  /69 (BP 1 Location: Right arm, BP Patient Position: Sitting)   Pulse 68   Temp 98 °F (36.7 °C)   Resp 18   Wt 67.1 kg (147 lb 14.4 oz)   SpO2 95%   Breastfeeding No   BMI 27.05 kg/m²     Patient Vitals for the past 12 hrs:   Temp Pulse Resp BP SpO2   10/06/20 1549 -- 68 18 121/69 --   10/06/20 1448 98 °F (36.7 °C) 81 18 (!) 116/57 95 %       Recent Results (from the past 12 hour(s))   POC CHEM8    Collection Time: 10/06/20  3:01 PM   Result Value Ref Range    Calcium, ionized (POC) 1.24 1.12 - 1.32 mmol/L    Sodium (POC) 139 136 - 145 mmol/L    Potassium (POC) 3.6 3.5 - 5.1 mmol/L    Chloride (POC) 100 98 - 107 mmol/L    CO2 (POC) 25 21 - 32 mmol/L    Anion gap (POC) 19 10 - 20 mmol/L    Glucose (POC) 136 (H) 65 - 100 mg/dL    BUN (POC) 25 (H) 9 - 20 mg/dL    Creatinine (POC) 1.0 0.6 - 1.3 mg/dL    GFRAA, POC >60 >60 ml/min/1.73m2    GFRNA, POC 53 (L) >60 ml/min/1.73m2    Hematocrit (POC) 42 35.0 - 47.0 %    Comment Comment Not Indicated. Medications:  Medications Administered     0.9% sodium chloride infusion     Admin Date  10/06/2020 Action  New Bag Dose  25 mL/hr Rate  25 mL/hr Route  IntraVENous Administered By  Tahira Christensen RN          zoledronic acid (RECLAST) IVPB 5 mg     Admin Date  10/06/2020 Action  Given Dose  5 mg Rate  400 mL/hr Route  IntraVENous Administered By  Bela Balderas RN              Pt tolerated treatment well. Peripheral IV taken out with no issues, pressure applied, wrapped in 2x2 gauze and coban. D/c home ambulatory in no distress.  Pt aware of next appointment scheduled for     Future Appointments   Date Time Provider Nery Wagner   10/5/2021  2:30 PM SS INF5 CH4 <1H [facility] Claudell Moris

## 2021-01-07 ENCOUNTER — TELEPHONE (OUTPATIENT)
Dept: FAMILY MEDICINE CLINIC | Age: 86
End: 2021-01-07

## 2021-01-07 NOTE — TELEPHONE ENCOUNTER
----- Message from Tj Maria sent at 1/7/2021  4:18 PM EST -----  Regarding: Dr. Ma/Telephone  Contact: [phone]  General Message/Vendor Calls    Caller's first and last name:      Reason for call: Dr. Ma's connect information      Callback required yes/no and why:Yes/Confirm      Best contact number(s):[phone]      Details to clarify the request: Patient received a letter regarding Dr. Ma leaving the practice.  Patient would like to know the doctors connect information.        Tj Maria

## 2021-04-20 ENCOUNTER — TRANSCRIBE ORDER (OUTPATIENT)
Dept: SCHEDULING | Age: 86
End: 2021-04-20

## 2021-04-20 DIAGNOSIS — Z12.31 VISIT FOR SCREENING MAMMOGRAM: Primary | ICD-10-CM

## 2021-07-12 ENCOUNTER — TRANSCRIBE ORDER (OUTPATIENT)
Dept: SCHEDULING | Age: 86
End: 2021-07-12

## 2021-07-12 DIAGNOSIS — Z12.31 SCREENING MAMMOGRAM FOR HIGH-RISK PATIENT: Primary | ICD-10-CM

## 2021-07-27 ENCOUNTER — HOSPITAL ENCOUNTER (OUTPATIENT)
Dept: MAMMOGRAPHY | Age: 86
Discharge: HOME | End: 2021-07-27
Attending: INTERNAL MEDICINE
Payer: MEDICARE

## 2021-07-27 DIAGNOSIS — Z12.31 SCREENING MAMMOGRAM FOR HIGH-RISK PATIENT: ICD-10-CM

## 2021-07-27 PROCEDURE — 77067 SCR MAMMO BI INCL CAD: CPT

## 2021-10-04 NOTE — PROGRESS NOTES
0830. Called and requested a new order for patient's reclast appointment tomorrow. Callback number & fax number given.

## 2021-10-05 ENCOUNTER — HOSPITAL ENCOUNTER (OUTPATIENT)
Dept: INFUSION THERAPY | Age: 86
Discharge: HOME | End: 2021-10-05

## 2021-10-05 NOTE — CALL BACK NOTE
New/updated order required for patient's upcoming [facility] appointment. Faxed doctor's office on 10/05/21 at 9:24 AM.  Refer to fax documents in pharmacy for further information.

## 2021-10-08 NOTE — CALL BACK NOTE
New/updated order required for patient's upcoming [facility] appointment. Faxed doctor's office on 10/08/21 at 7:21 AM.  Refer to fax documents in pharmacy for further information.

## 2021-10-11 RX ORDER — SODIUM CHLORIDE 9 MG/ML
25 INJECTION, SOLUTION INTRAVENOUS AS NEEDED
Status: DISPENSED | OUTPATIENT
Start: 2021-10-12 | End: 2021-10-12

## 2021-10-11 RX ORDER — ZOLEDRONIC ACID 5 MG/100ML
5 INJECTION, SOLUTION INTRAVENOUS ONCE
Status: COMPLETED | OUTPATIENT
Start: 2021-10-12 | End: 2021-10-12

## 2021-10-11 NOTE — PROGRESS NOTES
Called MD office and spoke with  in regards to the needed order for [address] assistant requested to have template faxed again, and she would ensure that a provider got to it today.     Faxed to Td Kapadia RN

## 2021-10-12 ENCOUNTER — HOSPITAL ENCOUNTER (OUTPATIENT)
Dept: INFUSION THERAPY | Age: 86
Discharge: HOME | End: 2021-10-12
Payer: MEDICARE

## 2021-10-12 VITALS
SYSTOLIC BLOOD PRESSURE: 140 MMHG | BODY MASS INDEX: 27.07 KG/M2 | HEIGHT: 62 IN | OXYGEN SATURATION: 95 % | DIASTOLIC BLOOD PRESSURE: 67 MMHG | RESPIRATION RATE: 18 BRPM | HEART RATE: 70 BPM | WEIGHT: 147.1 LBS | TEMPERATURE: 98.5 F

## 2021-10-12 LAB
ANION GAP BLD CALC-SCNC: 9 MMOL/L (ref 10–20)
CA-I BLD-MCNC: 1.26 MMOL/L (ref 1.12–1.32)
CHLORIDE BLD-SCNC: 103 MMOL/L (ref 98–107)
CO2 BLD-SCNC: 26.9 MMOL/L (ref 21–32)
CREAT BLD-MCNC: 0.94 MG/DL (ref 0.6–1.3)
GLUCOSE BLD-MCNC: 104 MG/DL (ref 65–100)
POTASSIUM BLD-SCNC: 4.3 MMOL/L (ref 3.5–5.1)
SERVICE CMNT-IMP: ABNORMAL
SODIUM BLD-SCNC: 138 MMOL/L (ref 136–145)

## 2021-10-12 PROCEDURE — 74011250636 HC RX REV CODE- 250/636: Performed by: INTERNAL MEDICINE

## 2021-10-12 PROCEDURE — 80047 BASIC METABLC PNL IONIZED CA: CPT

## 2021-10-12 PROCEDURE — 74011000258 HC RX REV CODE- 258: Performed by: INTERNAL MEDICINE

## 2021-10-12 PROCEDURE — 96374 THER/PROPH/DIAG INJ IV PUSH: CPT

## 2021-10-12 RX ADMIN — ZOLEDRONIC ACID 5 MG: 5 INJECTION, SOLUTION INTRAVENOUS at 13:22

## 2021-10-12 RX ADMIN — SODIUM CHLORIDE 25 ML/HR: 900 INJECTION, SOLUTION INTRAVENOUS at 13:22

## 2021-10-12 NOTE — PROGRESS NOTES
hospitals Progress Note    Date: 2021    Name: Yeimy Hayes    MRN: [id number]         : 1935    Ms. Myers Arrived ambulatory and in no distress for Reclast Infusion. Assessment was completed, no acute issues at this time, no new complaints voiced. 24 G PIV established to Right arm, + blood return. Ms. Trent Severe vitals were reviewed. Patient Vitals for the past 12 hrs:   Temp Pulse Resp BP SpO2   10/12/21 1343 -- 70 18 (!) 140/67 --   10/12/21 1259 98.5 °F (36.9 °C) 77 18 (!) 145/65 95 %     Lab results were obtained and reviewed. Recent Results (from the past 12 hour(s))   POC CHEM8    Collection Time: 10/12/21  1:14 PM   Result Value Ref Range    Calcium, ionized (POC) 1.26 1.12 - 1.32 mmol/L    Sodium (POC) 138 136 - 145 mmol/L    Potassium (POC) 4.3 3.5 - 5.1 mmol/L    Chloride (POC) 103 98 - 107 mmol/L    CO2 (POC) 26.9 21 - 32 mmol/L    Anion gap (POC) 9 (L) 10 - 20 mmol/L    Glucose (POC) 104 (H) 65 - 100 mg/dL    Creatinine (POC) 0.94 0.6 - 1.3 mg/dL    GFRAA, POC >60 >60 ml/min/1.73m2    GFRNA, POC 57 (L) >60 ml/min/1.73m2    Comment Comment Not Indicated. Medications:  Medications Administered     0.9% sodium chloride infusion     Admin Date  10/12/2021 Action  New Bag Dose  25 mL/hr Rate  25 mL/hr Route  IntraVENous Administered By  [place]          zoledronic acid (RECLAST) IVPB 5 mg     Admin Date  10/12/2021 Action  New Bag Dose  5 mg Rate  400 mL/hr Route  IntraVENous Administered By  Aracely Mariscal                Ms. Dariana Casper tolerated treatment well and was discharged from [place] [address] in stable condition at 1350. PIV flushed & removed. She is aware that she needs to follow up to schedule next appointment.     [facility]  2021

## 2021-11-23 ENCOUNTER — ANESTHESIA EVENT (OUTPATIENT)
Dept: SURGERY | Age: 86
DRG: 494 | End: 2021-11-23
Payer: MEDICARE

## 2021-11-23 ENCOUNTER — APPOINTMENT (OUTPATIENT)
Dept: GENERAL RADIOLOGY | Age: 86
DRG: 494 | End: 2021-11-23
Attending: STUDENT IN AN ORGANIZED HEALTH CARE EDUCATION/TRAINING PROGRAM
Payer: MEDICARE

## 2021-11-23 ENCOUNTER — ANESTHESIA (OUTPATIENT)
Dept: SURGERY | Age: 86
DRG: 494 | End: 2021-11-23
Payer: MEDICARE

## 2021-11-23 ENCOUNTER — HOSPITAL ENCOUNTER (INPATIENT)
Age: 86
LOS: 10 days | Discharge: NURSING, REHAB OR LONG-TERM CARE | DRG: 494 | End: 2021-12-03
Attending: STUDENT IN AN ORGANIZED HEALTH CARE EDUCATION/TRAINING PROGRAM | Admitting: INTERNAL MEDICINE
Payer: MEDICARE

## 2021-11-23 ENCOUNTER — APPOINTMENT (OUTPATIENT)
Dept: GENERAL RADIOLOGY | Age: 86
DRG: 494 | End: 2021-11-23
Attending: ORTHOPAEDIC SURGERY
Payer: MEDICARE

## 2021-11-23 DIAGNOSIS — S82.842B TYPE I OR II OPEN BIMALLEOLAR FRACTURE OF LEFT ANKLE, INITIAL ENCOUNTER: Primary | ICD-10-CM

## 2021-11-23 PROBLEM — S82.842A BIMALLEOLAR FRACTURE OF LEFT ANKLE: Status: ACTIVE | Noted: 2021-11-23

## 2021-11-23 LAB
ALBUMIN SERPL-MCNC: 3.2 G/DL (ref 3.5–5)
ALBUMIN/GLOB SERPL: 0.9 {RATIO} (ref 1.1–2.2)
ALP SERPL-CCNC: 70 U/L (ref 45–117)
ALT SERPL-CCNC: 25 U/L (ref 12–78)
ANION GAP SERPL CALC-SCNC: 5 MMOL/L (ref 5–15)
AST SERPL-CCNC: 22 U/L (ref 15–37)
BASOPHILS # BLD: 0.1 K/UL (ref 0–0.1)
BASOPHILS NFR BLD: 0 % (ref 0–1)
BILIRUB SERPL-MCNC: 0.4 MG/DL (ref 0.2–1)
BUN SERPL-MCNC: 18 MG/DL (ref 6–20)
BUN/CREAT SERPL: 20 (ref 12–20)
CALCIUM SERPL-MCNC: 9.1 MG/DL (ref 8.5–10.1)
CHLORIDE SERPL-SCNC: 108 MMOL/L (ref 97–108)
CO2 SERPL-SCNC: 26 MMOL/L (ref 21–32)
COMMENT, HOLDF: NORMAL
COVID-19 RAPID TEST, COVR: NOT DETECTED
CREAT SERPL-MCNC: 0.9 MG/DL (ref 0.55–1.02)
DIFFERENTIAL METHOD BLD: ABNORMAL
EOSINOPHIL # BLD: 0 K/UL (ref 0–0.4)
EOSINOPHIL NFR BLD: 0 % (ref 0–7)
ERYTHROCYTE [DISTWIDTH] IN BLOOD BY AUTOMATED COUNT: 13.3 % (ref 11.5–14.5)
GLOBULIN SER CALC-MCNC: 3.5 G/DL (ref 2–4)
GLUCOSE BLD STRIP.AUTO-MCNC: 125 MG/DL (ref 65–117)
GLUCOSE SERPL-MCNC: 130 MG/DL (ref 65–100)
HCT VFR BLD AUTO: 40.7 % (ref 35–47)
HGB BLD-MCNC: 13.2 G/DL (ref 11.5–16)
IMM GRANULOCYTES # BLD AUTO: 0.1 K/UL (ref 0–0.04)
IMM GRANULOCYTES NFR BLD AUTO: 1 % (ref 0–0.5)
LYMPHOCYTES # BLD: 1.1 K/UL (ref 0.8–3.5)
LYMPHOCYTES NFR BLD: 9 % (ref 12–49)
MCH RBC QN AUTO: 28.8 PG (ref 26–34)
MCHC RBC AUTO-ENTMCNC: 32.4 G/DL (ref 30–36.5)
MCV RBC AUTO: 88.9 FL (ref 80–99)
MONOCYTES # BLD: 0.8 K/UL (ref 0–1)
MONOCYTES NFR BLD: 6 % (ref 5–13)
NEUTS SEG # BLD: 9.9 K/UL (ref 1.8–8)
NEUTS SEG NFR BLD: 84 % (ref 32–75)
NRBC # BLD: 0 K/UL (ref 0–0.01)
NRBC BLD-RTO: 0 PER 100 WBC
PLATELET # BLD AUTO: 250 K/UL (ref 150–400)
PMV BLD AUTO: 9 FL (ref 8.9–12.9)
POTASSIUM SERPL-SCNC: 4 MMOL/L (ref 3.5–5.1)
PROT SERPL-MCNC: 6.7 G/DL (ref 6.4–8.2)
RBC # BLD AUTO: 4.58 M/UL (ref 3.8–5.2)
SAMPLES BEING HELD,HOLD: NORMAL
SERVICE CMNT-IMP: ABNORMAL
SODIUM SERPL-SCNC: 139 MMOL/L (ref 136–145)
SOURCE, COVRS: NORMAL
WBC # BLD AUTO: 11.8 K/UL (ref 3.6–11)

## 2021-11-23 PROCEDURE — 74011000250 HC RX REV CODE- 250: Performed by: ANESTHESIOLOGY

## 2021-11-23 PROCEDURE — 77030034776 HC ROD EXT FIX SEMI CIRC MRI STRY -G: Performed by: ORTHOPAEDIC SURGERY

## 2021-11-23 PROCEDURE — 74011250636 HC RX REV CODE- 250/636: Performed by: ANESTHESIOLOGY

## 2021-11-23 PROCEDURE — 90471 IMMUNIZATION ADMIN: CPT

## 2021-11-23 PROCEDURE — 80053 COMPREHEN METABOLIC PANEL: CPT

## 2021-11-23 PROCEDURE — 74011250637 HC RX REV CODE- 250/637: Performed by: HOSPITALIST

## 2021-11-23 PROCEDURE — 77030007866 HC KT SPN ANES BBMI -B: Performed by: ANESTHESIOLOGY

## 2021-11-23 PROCEDURE — 77030013708 HC HNDPC SUC IRR PULS STRY –B: Performed by: ORTHOPAEDIC SURGERY

## 2021-11-23 PROCEDURE — 96375 TX/PRO/DX INJ NEW DRUG ADDON: CPT

## 2021-11-23 PROCEDURE — 74011250636 HC RX REV CODE- 250/636: Performed by: PHYSICIAN ASSISTANT

## 2021-11-23 PROCEDURE — 85025 COMPLETE CBC W/AUTO DIFF WBC: CPT

## 2021-11-23 PROCEDURE — 65270000029 HC RM PRIVATE

## 2021-11-23 PROCEDURE — 74011000250 HC RX REV CODE- 250: Performed by: STUDENT IN AN ORGANIZED HEALTH CARE EDUCATION/TRAINING PROGRAM

## 2021-11-23 PROCEDURE — 96374 THER/PROPH/DIAG INJ IV PUSH: CPT

## 2021-11-23 PROCEDURE — 93005 ELECTROCARDIOGRAM TRACING: CPT

## 2021-11-23 PROCEDURE — 77030038269 HC DRN EXT URIN PURWCK BARD -A

## 2021-11-23 PROCEDURE — 77030002933 HC SUT MCRYL J&J -A: Performed by: ORTHOPAEDIC SURGERY

## 2021-11-23 PROCEDURE — 77030002916 HC SUT ETHLN J&J -A: Performed by: ORTHOPAEDIC SURGERY

## 2021-11-23 PROCEDURE — 77030035763: Performed by: ORTHOPAEDIC SURGERY

## 2021-11-23 PROCEDURE — 90715 TDAP VACCINE 7 YRS/> IM: CPT | Performed by: STUDENT IN AN ORGANIZED HEALTH CARE EDUCATION/TRAINING PROGRAM

## 2021-11-23 PROCEDURE — 76210000034 HC AMBSU PH I REC 0.5 TO 1 HR: Performed by: ORTHOPAEDIC SURGERY

## 2021-11-23 PROCEDURE — 77030031139 HC SUT VCRL2 J&J -A: Performed by: ORTHOPAEDIC SURGERY

## 2021-11-23 PROCEDURE — 76030000001 HC AMB SURG OR TIME 1 TO 1.5: Performed by: ORTHOPAEDIC SURGERY

## 2021-11-23 PROCEDURE — 76060000062 HC AMB SURG ANES 1 TO 1.5 HR: Performed by: ORTHOPAEDIC SURGERY

## 2021-11-23 PROCEDURE — 74011000250 HC RX REV CODE- 250: Performed by: PHYSICIAN ASSISTANT

## 2021-11-23 PROCEDURE — 87635 SARS-COV-2 COVID-19 AMP PRB: CPT

## 2021-11-23 PROCEDURE — 77030035497 HC PIN EXT FIX TRNFX APEX STRY -C: Performed by: ORTHOPAEDIC SURGERY

## 2021-11-23 PROCEDURE — 74011000272 HC RX REV CODE- 272: Performed by: ORTHOPAEDIC SURGERY

## 2021-11-23 PROCEDURE — 71045 X-RAY EXAM CHEST 1 VIEW: CPT

## 2021-11-23 PROCEDURE — 74011250636 HC RX REV CODE- 250/636: Performed by: STUDENT IN AN ORGANIZED HEALTH CARE EDUCATION/TRAINING PROGRAM

## 2021-11-23 PROCEDURE — 77030035457: Performed by: ORTHOPAEDIC SURGERY

## 2021-11-23 PROCEDURE — 36415 COLL VENOUS BLD VENIPUNCTURE: CPT

## 2021-11-23 PROCEDURE — 74011250637 HC RX REV CODE- 250/637: Performed by: PHYSICIAN ASSISTANT

## 2021-11-23 PROCEDURE — 2709999900 HC NON-CHARGEABLE SUPPLY

## 2021-11-23 PROCEDURE — 74011000258 HC RX REV CODE- 258: Performed by: STUDENT IN AN ORGANIZED HEALTH CARE EDUCATION/TRAINING PROGRAM

## 2021-11-23 PROCEDURE — 77030034775 HC CLMP PIN EXT FIX ANG POST STRY -G: Performed by: ORTHOPAEDIC SURGERY

## 2021-11-23 PROCEDURE — 74011250636 HC RX REV CODE- 250/636: Performed by: INTERNAL MEDICINE

## 2021-11-23 PROCEDURE — 0QBH0ZZ EXCISION OF LEFT TIBIA, OPEN APPROACH: ICD-10-PCS | Performed by: ORTHOPAEDIC SURGERY

## 2021-11-23 PROCEDURE — 82962 GLUCOSE BLOOD TEST: CPT

## 2021-11-23 PROCEDURE — 2709999900 HC NON-CHARGEABLE SUPPLY: Performed by: ORTHOPAEDIC SURGERY

## 2021-11-23 PROCEDURE — 77030009024 HC CAP PROTCT PIN STRY -B: Performed by: ORTHOPAEDIC SURGERY

## 2021-11-23 PROCEDURE — 99284 EMERGENCY DEPT VISIT MOD MDM: CPT

## 2021-11-23 PROCEDURE — 73610 X-RAY EXAM OF ANKLE: CPT

## 2021-11-23 PROCEDURE — 74011250636 HC RX REV CODE- 250/636: Performed by: ORTHOPAEDIC SURGERY

## 2021-11-23 PROCEDURE — 74011250637 HC RX REV CODE- 250/637: Performed by: ORTHOPAEDIC SURGERY

## 2021-11-23 PROCEDURE — C1713 ANCHOR/SCREW BN/BN,TIS/BN: HCPCS | Performed by: ORTHOPAEDIC SURGERY

## 2021-11-23 RX ORDER — NALOXONE HYDROCHLORIDE 0.4 MG/ML
0.4 INJECTION, SOLUTION INTRAMUSCULAR; INTRAVENOUS; SUBCUTANEOUS AS NEEDED
Status: DISCONTINUED | OUTPATIENT
Start: 2021-11-23 | End: 2021-12-03 | Stop reason: HOSPADM

## 2021-11-23 RX ORDER — ACETAMINOPHEN 650 MG/1
650 SUPPOSITORY RECTAL EVERY 6 HOURS
Status: DISCONTINUED | OUTPATIENT
Start: 2021-11-23 | End: 2021-11-27

## 2021-11-23 RX ORDER — POLYETHYLENE GLYCOL 3350 17 G/17G
17 POWDER, FOR SOLUTION ORAL DAILY PRN
Status: DISCONTINUED | OUTPATIENT
Start: 2021-11-23 | End: 2021-12-03 | Stop reason: HOSPADM

## 2021-11-23 RX ORDER — SODIUM CHLORIDE 0.9 % (FLUSH) 0.9 %
5-40 SYRINGE (ML) INJECTION AS NEEDED
Status: DISCONTINUED | OUTPATIENT
Start: 2021-11-23 | End: 2021-12-03 | Stop reason: HOSPADM

## 2021-11-23 RX ORDER — MORPHINE SULFATE 2 MG/ML
1 INJECTION, SOLUTION INTRAMUSCULAR; INTRAVENOUS
Status: DISCONTINUED | OUTPATIENT
Start: 2021-11-23 | End: 2021-11-24

## 2021-11-23 RX ORDER — EPHEDRINE SULFATE/0.9% NACL/PF 50 MG/5 ML
SYRINGE (ML) INTRAVENOUS AS NEEDED
Status: DISCONTINUED | OUTPATIENT
Start: 2021-11-23 | End: 2021-11-23 | Stop reason: HOSPADM

## 2021-11-23 RX ORDER — ACETAMINOPHEN 325 MG/1
650 TABLET ORAL EVERY 6 HOURS
Status: DISCONTINUED | OUTPATIENT
Start: 2021-11-23 | End: 2021-12-02

## 2021-11-23 RX ORDER — FACIAL-BODY WIPES
10 EACH TOPICAL DAILY PRN
Status: DISCONTINUED | OUTPATIENT
Start: 2021-11-25 | End: 2021-12-03 | Stop reason: HOSPADM

## 2021-11-23 RX ORDER — MORPHINE SULFATE 4 MG/ML
4 INJECTION INTRAVENOUS
Status: COMPLETED | OUTPATIENT
Start: 2021-11-23 | End: 2021-11-23

## 2021-11-23 RX ORDER — MORPHINE SULFATE 2 MG/ML
2 INJECTION, SOLUTION INTRAMUSCULAR; INTRAVENOUS
Status: DISCONTINUED | OUTPATIENT
Start: 2021-11-23 | End: 2021-12-03 | Stop reason: HOSPADM

## 2021-11-23 RX ORDER — FERROUS SULFATE, DRIED 160(50) MG
1 TABLET, EXTENDED RELEASE ORAL DAILY
Status: DISCONTINUED | OUTPATIENT
Start: 2021-11-23 | End: 2021-11-23

## 2021-11-23 RX ORDER — FENTANYL CITRATE 50 UG/ML
INJECTION, SOLUTION INTRAMUSCULAR; INTRAVENOUS AS NEEDED
Status: DISCONTINUED | OUTPATIENT
Start: 2021-11-23 | End: 2021-11-23 | Stop reason: HOSPADM

## 2021-11-23 RX ORDER — POLYETHYLENE GLYCOL 3350 17 G/17G
17 POWDER, FOR SOLUTION ORAL DAILY
Status: DISCONTINUED | OUTPATIENT
Start: 2021-11-23 | End: 2021-11-25

## 2021-11-23 RX ORDER — SODIUM CHLORIDE 0.9 % (FLUSH) 0.9 %
5-40 SYRINGE (ML) INJECTION EVERY 8 HOURS
Status: DISCONTINUED | OUTPATIENT
Start: 2021-11-23 | End: 2021-12-03 | Stop reason: HOSPADM

## 2021-11-23 RX ORDER — PROPOFOL 10 MG/ML
INJECTION, EMULSION INTRAVENOUS
Status: DISCONTINUED | OUTPATIENT
Start: 2021-11-23 | End: 2021-11-23 | Stop reason: HOSPADM

## 2021-11-23 RX ORDER — PANTOPRAZOLE SODIUM 40 MG/1
40 TABLET, DELAYED RELEASE ORAL
Status: DISCONTINUED | OUTPATIENT
Start: 2021-11-24 | End: 2021-12-03 | Stop reason: HOSPADM

## 2021-11-23 RX ORDER — OXYCODONE HYDROCHLORIDE 5 MG/1
5 TABLET ORAL
Status: DISCONTINUED | OUTPATIENT
Start: 2021-11-23 | End: 2021-12-03 | Stop reason: HOSPADM

## 2021-11-23 RX ORDER — ACETAMINOPHEN 650 MG/1
650 SUPPOSITORY RECTAL
Status: DISCONTINUED | OUTPATIENT
Start: 2021-11-23 | End: 2021-11-23

## 2021-11-23 RX ORDER — ACETAMINOPHEN 325 MG/1
650 TABLET ORAL
Status: DISCONTINUED | OUTPATIENT
Start: 2021-11-23 | End: 2021-11-23

## 2021-11-23 RX ORDER — ONDANSETRON 2 MG/ML
4 INJECTION INTRAMUSCULAR; INTRAVENOUS
Status: DISCONTINUED | OUTPATIENT
Start: 2021-11-23 | End: 2021-12-03 | Stop reason: HOSPADM

## 2021-11-23 RX ORDER — FERROUS SULFATE, DRIED 160(50) MG
1 TABLET, EXTENDED RELEASE ORAL
Status: DISCONTINUED | OUTPATIENT
Start: 2021-11-24 | End: 2021-12-03 | Stop reason: HOSPADM

## 2021-11-23 RX ORDER — MIRTAZAPINE 15 MG/1
7.5 TABLET, FILM COATED ORAL
Status: DISCONTINUED | OUTPATIENT
Start: 2021-11-23 | End: 2021-12-03 | Stop reason: HOSPADM

## 2021-11-23 RX ORDER — PROMETHAZINE HYDROCHLORIDE 25 MG/1
12.5 TABLET ORAL
Status: DISCONTINUED | OUTPATIENT
Start: 2021-11-23 | End: 2021-12-03 | Stop reason: HOSPADM

## 2021-11-23 RX ORDER — BUPIVACAINE HYDROCHLORIDE 7.5 MG/ML
INJECTION, SOLUTION INTRASPINAL
Status: SHIPPED | OUTPATIENT
Start: 2021-11-23 | End: 2021-11-23

## 2021-11-23 RX ORDER — SODIUM CHLORIDE 9 MG/ML
125 INJECTION, SOLUTION INTRAVENOUS CONTINUOUS
Status: DISCONTINUED | OUTPATIENT
Start: 2021-11-23 | End: 2021-11-24

## 2021-11-23 RX ORDER — SENNOSIDES 8.6 MG/1
1 TABLET ORAL DAILY PRN
Status: DISCONTINUED | OUTPATIENT
Start: 2021-11-23 | End: 2021-12-03 | Stop reason: HOSPADM

## 2021-11-23 RX ORDER — ENOXAPARIN SODIUM 100 MG/ML
40 INJECTION SUBCUTANEOUS DAILY
Status: DISCONTINUED | OUTPATIENT
Start: 2021-11-23 | End: 2021-12-01

## 2021-11-23 RX ORDER — SODIUM CHLORIDE, SODIUM LACTATE, POTASSIUM CHLORIDE, CALCIUM CHLORIDE 600; 310; 30; 20 MG/100ML; MG/100ML; MG/100ML; MG/100ML
INJECTION, SOLUTION INTRAVENOUS
Status: DISCONTINUED | OUTPATIENT
Start: 2021-11-23 | End: 2021-11-23 | Stop reason: HOSPADM

## 2021-11-23 RX ORDER — TRAMADOL HYDROCHLORIDE 50 MG/1
50 TABLET ORAL
Status: DISCONTINUED | OUTPATIENT
Start: 2021-11-23 | End: 2021-12-03 | Stop reason: HOSPADM

## 2021-11-23 RX ORDER — AMOXICILLIN 250 MG
1 CAPSULE ORAL 2 TIMES DAILY
Status: DISCONTINUED | OUTPATIENT
Start: 2021-11-23 | End: 2021-12-03 | Stop reason: HOSPADM

## 2021-11-23 RX ADMIN — SODIUM CHLORIDE 125 ML/HR: 9 INJECTION, SOLUTION INTRAVENOUS at 09:43

## 2021-11-23 RX ADMIN — FENTANYL CITRATE 100 MCG: 50 INJECTION, SOLUTION INTRAMUSCULAR; INTRAVENOUS at 07:47

## 2021-11-23 RX ADMIN — Medication 10 ML: at 21:14

## 2021-11-23 RX ADMIN — GENTAMICIN SULFATE 250 MG: 40 INJECTION, SOLUTION INTRAMUSCULAR; INTRAVENOUS at 08:06

## 2021-11-23 RX ADMIN — OXYCODONE 5 MG: 5 TABLET ORAL at 23:35

## 2021-11-23 RX ADMIN — MORPHINE SULFATE 4 MG: 4 INJECTION INTRAVENOUS at 05:59

## 2021-11-23 RX ADMIN — Medication 10 MG: at 08:35

## 2021-11-23 RX ADMIN — DOCUSATE SODIUM 50MG AND SENNOSIDES 8.6MG 1 TABLET: 8.6; 5 TABLET, FILM COATED ORAL at 17:40

## 2021-11-23 RX ADMIN — WATER 2 G: 1 INJECTION INTRAMUSCULAR; INTRAVENOUS; SUBCUTANEOUS at 12:04

## 2021-11-23 RX ADMIN — ACETAMINOPHEN 650 MG: 325 TABLET ORAL at 12:23

## 2021-11-23 RX ADMIN — PROPOFOL 50 MCG/KG/MIN: 10 INJECTION, EMULSION INTRAVENOUS at 08:04

## 2021-11-23 RX ADMIN — Medication 10 MG: at 08:21

## 2021-11-23 RX ADMIN — Medication 1 TABLET: at 11:25

## 2021-11-23 RX ADMIN — WATER 2 G: 1 INJECTION INTRAMUSCULAR; INTRAVENOUS; SUBCUTANEOUS at 21:05

## 2021-11-23 RX ADMIN — MORPHINE SULFATE 2 MG: 2 INJECTION, SOLUTION INTRAMUSCULAR; INTRAVENOUS at 06:31

## 2021-11-23 RX ADMIN — ONDANSETRON 4 MG: 2 INJECTION INTRAMUSCULAR; INTRAVENOUS at 06:31

## 2021-11-23 RX ADMIN — PROPOFOL 50 MCG/KG/MIN: 10 INJECTION, EMULSION INTRAVENOUS at 08:02

## 2021-11-23 RX ADMIN — OXYCODONE 5 MG: 5 TABLET ORAL at 12:23

## 2021-11-23 RX ADMIN — MIRTAZAPINE 7.5 MG: 15 TABLET, FILM COATED ORAL at 21:07

## 2021-11-23 RX ADMIN — ENOXAPARIN SODIUM 40 MG: 100 INJECTION SUBCUTANEOUS at 12:04

## 2021-11-23 RX ADMIN — DOCUSATE SODIUM 50MG AND SENNOSIDES 8.6MG 1 TABLET: 8.6; 5 TABLET, FILM COATED ORAL at 12:03

## 2021-11-23 RX ADMIN — OXYCODONE 5 MG: 5 TABLET ORAL at 17:40

## 2021-11-23 RX ADMIN — MORPHINE SULFATE 1 MG: 2 INJECTION, SOLUTION INTRAMUSCULAR; INTRAVENOUS at 14:18

## 2021-11-23 RX ADMIN — WATER 1 G: 1 INJECTION INTRAMUSCULAR; INTRAVENOUS; SUBCUTANEOUS at 05:48

## 2021-11-23 RX ADMIN — SODIUM CHLORIDE, POTASSIUM CHLORIDE, SODIUM LACTATE AND CALCIUM CHLORIDE: 600; 310; 30; 20 INJECTION, SOLUTION INTRAVENOUS at 07:47

## 2021-11-23 RX ADMIN — Medication 10 MG: at 08:05

## 2021-11-23 RX ADMIN — SODIUM CHLORIDE, POTASSIUM CHLORIDE, SODIUM LACTATE AND CALCIUM CHLORIDE: 600; 310; 30; 20 INJECTION, SOLUTION INTRAVENOUS at 09:00

## 2021-11-23 RX ADMIN — ACETAMINOPHEN 650 MG: 325 TABLET ORAL at 19:49

## 2021-11-23 RX ADMIN — POLYETHYLENE GLYCOL 3350 17 G: 17 POWDER, FOR SOLUTION ORAL at 12:04

## 2021-11-23 RX ADMIN — BUPIVACAINE HYDROCHLORIDE 10.5 MG: 7.5 INJECTION, SOLUTION INTRASPINAL at 08:00

## 2021-11-23 RX ADMIN — TETANUS TOXOID, REDUCED DIPHTHERIA TOXOID AND ACELLULAR PERTUSSIS VACCINE, ADSORBED 0.5 ML: 5; 2.5; 8; 8; 2.5 SUSPENSION INTRAMUSCULAR at 05:48

## 2021-11-23 RX ADMIN — Medication 10 MG: at 08:15

## 2021-11-23 NOTE — ROUTINE PROCESS
TRANSFER - OUT REPORT:    Verbal report given to [address]ildred Spencer RN (name) on Jesika Fagan  being transferred to [facility](unit) for routine post - op       Report consisted of patients Situation, Background, Assessment and   Recommendations(SBAR). Information from the following report(s) SBAR was reviewed with the receiving nurse. Lines:   Peripheral IV 11/23/21 Distal; Left; Posterior Forearm (Active)   Site Assessment Clean, dry, & intact 11/23/21 0907   Phlebitis Assessment 0 11/23/21 0907   Infiltration Assessment 0 11/23/21 0907   Dressing Status Clean, dry, & intact 11/23/21 0907   Dressing Type Transparent 11/23/21 0907   Hub Color/Line Status Infusing 11/23/21 6507        Opportunity for questions and clarification was provided.       Patient transported with:   Registered Nurse

## 2021-11-23 NOTE — PROGRESS NOTES
11/23/2021  2:22 PM  Care Management Assessment      Reason for Admission: Emergency - Surgery required      ICD-10-CM ICD-9-CM    1. Type I or II open bimalleolar fracture of left ankle, initial encounter  S82.842B 824.5        Assessment:   [x]In person with pt   []Via p/c with pt   [x]With family member in person. Who/Relation: Clearance Alesha - son     []With family member via p/c. Who/Relation:   []Chart Review    RUR: 6%  Risk Level: [x]Low []Moderate []High  Value-based purchasing: [x] Yes [] No  Bundle patient: [] Yes [] No   Specify:     Advance Directive: Full Code.    [] No AD on file. [x] AD on file. [] Current AD not on file. Copy requested. [] Requests AD, and referral submitted to [facility]. 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      Prior DME required:  [x]None []RW []Cane []Crutches []Bedside commode []CPAP []Home O2 (Liter/Provider: ) []Nebulizer   []Shower Chair []Wheelchair []Hospital Bed []Jose []Stair lift []Rollator []Other:    DME available: [x]None []RW []Cane []Crutches []Bedside commode []CPAP []Home O2 (Liter/Provider: ) []Nebulizer   []Shower Chair []Wheelchair []Hospital Bed []Jose []Stair lift []Rollator []Other:    Rehab history: [x]None []Outpatient PT []Home Health (Provider/Date: ) []SNF (Provider/Date: ) []IPR (Provider/Date: ) []LTC (Provider/Date: ) []Hospice (Provider/Date: )  []Other:     Discharge Concerns: [x]Yes []No []Unknown   Describe: Pt's family reports they feel pt will need rehab. Pt's family is working on securing respite care for pt's  while pt is recovering. Comments: Insurer:   Brian Frey [address] Phone: [phone]    Subscriber: Zunilda Hall Subscriber#: [id number]    Group#: [id number] Precert#: --          PCP: Diane Mandujano   Address: [place] / [address] [facility] [address] [address]   Phone number: [phone]   Current patient: [x]Yes []No   Approximate date of last visit: within 1 year   Access to virtual PCP visits: []Yes [x]No    Pharmacy:  Expert. Transport: TBD       Transition of care plan:    [x]Unable to determine at this time. Awaiting clinical progress, and disposition recommendations. Awaiting PT/OT evals. Pt likely will require SNF placement. SNF listing provided to pt and pt's son for review. Preferences were requested. [] Home with outpatient follow-up    [] Home with Outpatient PT and outpatient follow-up   Pt aware of OP appt? []Yes, Provider:   []Not scheduled   Transport provider:     [] Home with family assistance as needed and outpatient follow-up   Family able to assist:    Schedule:  Transport provider:      [] Home with Home Health   - Provider:     [x]SNF/IPR   -[]Preferences given:   [x]Listing provided and preferences requested   -Status: []Pending []Accepted:    -Auth required: [x]Yes []No    -Auth initiated date:   -3 midnight stay required: []Yes [x]No  Date satisfied:     [] Home with Hospice   -Provider:     [] [facility] information provided. [] Other:     Jerry Bettencourt MA    Care Management Interventions  PCP Verified by CM: Yes Ta Rivera)  Mode of Transport at Discharge:  Other (see comment)  Transition of Care Consult (CM Consult): Discharge Planning  MyChart Signup: No  Discharge Durable Medical Equipment: No  Physical Therapy Consult: Yes  Occupational Therapy Consult: Yes  Speech Therapy Consult: No  Support Systems: Child(irasema)  Confirm Follow Up Transport: Family  Discharge Location  Discharge Placement: Skilled nursing facility

## 2021-11-23 NOTE — H&P
[facility]  [address] [place] [address], [facility] [address]  [phone]    Admission History and Physical      NAME:       Naveed Harrington   :       1935   MRN:      [id number]     PCP:      Latha Zelaya MD     Date of service:   2021     Chief  Complaint: Left ankle pain and swelling    History Of Presenting Illness:       Ms. Rosalinda García is a 80 y.o. female who is being admitted for an open bimalleolar fracture of left ankle. Ms. Rosalinda García presented to our Emergency Department today complaining of moderately severe aching pain, right ankle following a fall at home. She had no LOC. She denies any chest pain or SOB. No cough or fever. No abdominal symptoms. In the ED, x-rays done showed an acute bimalleolar fracture. Widening of the medial ankle mortise. Soft tissue gas and  soft tissue swelling. She is unable to ambulate. She will be admitted for an ortho review and further management. Allergies   Allergen Reactions    Statins-Hmg-Coa Reductase Inhibitors Myalgia     Lots of different statins were tried       Prior to Admission medications    Medication Sig Start Date End Date Taking? Authorizing Provider   acyclovir (ZOVIRAX) 400 mg tablet TAKE 1 TABLET BY MOUTH 2 TIMES A DAY TO PREVENT COLD SORES 10/13/20   George Ma MD   diclofenac (VOLTAREN) 1 % gel Apply 1 g to affected area four (4) times daily. 9/15/20   George Ma MD   mirtazapine (REMERON) 7.5 mg tablet Take 1 Tab by mouth nightly. 9/15/20   George Ma MD   ibuprofen (MOTRIN) 600 mg tablet TAKE ONE TABLET BY MOUTH EVERY SIX HOURS AS NEEDED for pain 20   George Ma MD   Biotin 2,500 mcg cap Take  by mouth daily.     Provider, Historical   Calcium-Cholecalciferol, D3, 500 mg(1,250mg) -125 unit tab 1 per day 17   George Ma MD multivitamin (ONE A DAY) tablet Take 1 Tab by mouth daily. Takes up to 2 tabs on the 200 mg 4/18/17   Obdulia Ma MD   omeprazole (PRILOSEC) 20 mg capsule Take 1 Cap by mouth daily. 12/9/16   Obdulia Ma MD   DOCOSAHEXANOIC ACID/EPA (FISH OIL PO) Take  by mouth. 2 tablet a day    Nolan Nava MD   red yeast rice extract 600 mg cap Take 600 mg by mouth two (2) times a day. Nolan Nava MD       Past Medical History:   Diagnosis Date    Arthritis     Gastrointestinal disorder     gerd    GERD (gastroesophageal reflux disease)     Ill-defined condition     cholesterol        Past Surgical History:   Procedure Laterality Date    COLONOSCOPY N/A 1/4/2019    Dr. Guzman Officer, no repeat recommended    HX BREAST BIOPSY  10-15 yrs ago    neg path, unsure which breast    HX COLONOSCOPY  01/01/2012    approx date, normal    HX ENDOSCOPY      no ulcers    HX ORTHOPAEDIC      right shoulder    HX WISDOM TEETH EXTRACTION         Social History     Tobacco Use    Smoking status: Never Smoker    Smokeless tobacco: Never Used   Substance Use Topics    Alcohol use: Yes     Comment: mixed drink or wine 3-4x per week        Family History   Problem Relation Age of Onset    Hypertension Mother     Kidney Disease Mother         dialysis    No Known Problems Father     Cancer Brother         tongue, lung        Review of Systems:    Constitutional ROS: no fever, chills, rigors or night sweats  Respiratory ROS: no cough, sputum, hemoptysis, dyspnea or pleuritic pain. Cardiovascular ROS: no chest pain, palpitations, orthopnea, PND or syncope  Endocrine ROS: no polydispsia, polyuria, heat or cold intolerance or major weight change.   Gastrointestinal ROS: no dysphagia, abdominal pain, nausea, vomiting or diarrhea    Genito-Urinary ROS: no dysuria, frequency, hematuria, retention or flank pain  Musculoskeletal ROS: + left ankle joint pain, swelling and muscular tenderness  Neurological ROS: no headache, confusion, focal weakness or any other neurological symptoms  Psychiatric ROS: no depression, anxiety, mood swings  Dermatological ROS: no rash, pruritis, or urticaria  Heme-Lymph ROS: no swollen glands, bleeding    Examination:    Constitutional:    Visit Vitals  BP (!) 118/54   Pulse 70   Temp 97.8 °F (36.6 °C)   Resp 17   Wt 70.3 kg (155 lb)   SpO2 100%   BMI 28.35 kg/m²         General:  Weak and ill looking patient in no acute distress  Eyes: Pink conjunctivae, PERRLA with no discharge. Normal eye movements  Ear, Nose, Mouth & Throat: No ottorrhea, rhinorrhea, non tender sinuses, moist mucous membranes  Respiratory:  No accessory muscle use, clear breath sounds without crackles or wheezes  Cardiovascular:  No JVD or murmurs, regular and normal S1, S2 without thrills, bruits or peripheral edema. GI & :  Soft abdomen, non-tender, non-distended, normoactive bowel sounds with no palpable organomegaly  Heme:  No cervical or axillary adenopathy. Musculoskeletal:  No cyanosis, clubbing, atrophy or deformities. Dressed left foot  Skin:  No rashes, bruising or ulcers   Neurological: Awake and alert, speech is clear, CNs 2-12 are grossly intact and otherwise non focal  Psychiatric:  Has a fair insight and is oriented x 3  ________________________________________________________________________    Data Review:    Labs:    Recent Labs     11/23/21  0546   WBC 11.8*   HGB 13.2   HCT 40.7        Recent Labs     11/23/21  0546      K 4.0      CO2 26   *   BUN 18   CREA 0.90   CA 9.1   ALB 3.2*   ALT 25     No components found for: GLPOC  No results for input(s): PH, PCO2, PO2, HCO3, FIO2 in the last 72 hours. No results for input(s): INR, INREXT, INREXT in the last 72 hours. Imaging Studies:      Chest and left ankle xrays - reviewed     Personally reviewed 12 lead EKG: Normal rate, rhythm, axis and intervals.  and No acute changes suggestive of ischemia    I have also reviewed available old medical records. Assessment & Impression:     Ms. Leidy Mckoy is a 80 y.o. female being evaluated for:     Principal Problem:    Bimalleolar fracture of left ankle (11/23/2021)    Active Problems:    Pure hypercholesterolemia (12/9/2016)      Gastroesophageal reflux disease without esophagitis (12/9/2016)      Chronic bilateral low back pain without sciatica (12/9/2016)      Subclinical hypothyroidism (6/21/2018)         Plan of management:    Bimalleolar fracture of left ankle (11/23/2021) POA: following a fall. Admit to hospital. Keep NPO. Start IV fluids, IV morphine.  Consult orthopedic surgery for a washout    Gastroesophageal reflux disease without esophagitis POA: resume PPi    Pure hypercholesterolemia POA: mild and not taking any medications    Chronic bilateral low back pain without sciatica (12/9/2016) POA: pain control as above, PT, OT when feasible    Subclinical hypothyroidism (6/21/2018) POA: outpatient monitoring    Code Status:  Full    Surrogate decision maker: Family    Risk of deterioration: high      Total time spent for the care of the patient: [address] discussed with: Patient, Family, Nursing Staff and ED physician    Discussed:  Code Status, Care Plan and D/C Planning    Prophylaxis:  H2B/PPI    Probable Disposition:  SNF/LTC           ___________________________________________________    Attending Physician: Tere Nayak MD

## 2021-11-23 NOTE — ED PROVIDER NOTES
Patient is a 59-year-old female present emergency department with left ankle pain. Patient was walking down the steps this morning when she tripped falling. Patient has moderate to severe amount of swelling to the left ankle with an open laceration over the medial malleolus. Bleeding was controlled with quick clot by EMS placing a pillow splint. Patient states that she did not lose consciousness or hit her head she stated \"I know better and a good on the steps at night\". Patient denies being on blood thinners patient also states that she is not up-to-date on her tetanus.            Past Medical History:   Diagnosis Date    Arthritis     Gastrointestinal disorder     gerd    GERD (gastroesophageal reflux disease)     Ill-defined condition     cholesterol       Past Surgical History:   Procedure Laterality Date    COLONOSCOPY N/A 1/4/2019    Dr. Jose C Lopez, no repeat recommended    HX BREAST BIOPSY  10-15 yrs ago    neg path, unsure which breast    HX COLONOSCOPY  01/01/2012    approx date, normal    HX ENDOSCOPY      no ulcers    HX ORTHOPAEDIC      right shoulder    HX WISDOM TEETH EXTRACTION           Family History:   Problem Relation Age of Onset    Hypertension Mother     Kidney Disease Mother         dialysis    No Known Problems Father     Cancer Brother         tongue, lung       Social History     Socioeconomic History    Marital status:      Spouse name: Not on file    Number of children: Not on file    Years of education: Not on file    Highest education level: Not on file   Occupational History    Not on file   Tobacco Use    Smoking status: Never Smoker    Smokeless tobacco: Never Used   Substance and Sexual Activity    Alcohol use: Yes     Comment: mixed drink or wine 3-4x per week    Drug use: No    Sexual activity: Never   Other Topics Concern    Not on file   Social History Narrative    Has 4 children and 4 grandchildren     has dementia    Plays pickleball for exercise     Social Determinants of Health     Financial Resource Strain:     Difficulty of Paying Living Expenses: Not on file   Food Insecurity:     Worried About Running Out of Food in the Last Year: Not on file    Baljeet of Food in the Last Year: Not on file   Transportation Needs:     Lack of Transportation (Medical): Not on file    Lack of Transportation (Non-Medical): Not on file   Physical Activity:     Days of Exercise per Week: Not on file    Minutes of Exercise per Session: Not on file   Stress:     Feeling of Stress : Not on file   Social Connections:     Frequency of Communication with Friends and Family: Not on file    Frequency of Social Gatherings with Friends and Family: Not on file    Attends Buddhist Services: Not on file    Active Member of [address] [place] [address] [employer] or Organizations: Not on file    Attends Club or Organization Meetings: Not on file    Marital Status: Not on file   Intimate Partner Violence:     Fear of Current or Ex-Partner: Not on file    Emotionally Abused: Not on file    Physically Abused: Not on file    Sexually Abused: Not on file   Housing Stability:     Unable to Pay for Housing in the Last Year: Not on file    Number of [place] in the Last Year: Not on file    Unstable Housing in the Last Year: Not on file         ALLERGIES: Statins-hmg-coa reductase inhibitors    Review of Systems   Musculoskeletal: Positive for arthralgias and joint swelling. Skin: Positive for wound. All other systems reviewed and are negative. Vitals:    11/23/21 0510 11/23/21 0516   BP: (!) 140/57    Pulse: 70    Resp: 17    Temp: 97.8 °F (36.6 °C)    SpO2: 97% 97%   Weight: 70.3 kg (155 lb)             Physical Exam  Vitals and nursing note reviewed. Constitutional:       Appearance: Normal appearance. HENT:      Head: Normocephalic and atraumatic. Eyes:      Extraocular Movements: Extraocular movements intact. Pupils: Pupils are equal, round, and reactive to light. Cardiovascular:      Rate and Rhythm: Normal rate and regular rhythm. Pulses: Normal pulses. Heart sounds: Normal heart sounds. Pulmonary:      Effort: Pulmonary effort is normal.      Breath sounds: Normal breath sounds. Abdominal:      General: Abdomen is flat. Palpations: Abdomen is soft. Musculoskeletal:      Cervical back: Normal range of motion and neck supple. Left ankle: Deformity, ecchymosis and laceration present. Tenderness present over the medial malleolus. Decreased range of motion. Legs:       Comments: Laceration proximal medial malleolus actively oozing blood pressures not applied. Neurological:      General: No focal deficit present. Mental Status: She is alert and oriented to person, place, and time. MDM  Number of Diagnoses or Management Options  Diagnosis management comments: /V:, Ankle fracture. 75-year-old female presenting after ground-level fall falling down steps concern for an open fracture of the left ankle. Will obtain x-rays now. Procedures    5:44 AM  X-ray shows distal tibial/fibula fracture overlying the medial malleolus consistent with open fracture. Will give patient Ancef, Tdap booster now EKG chest x-ray labs for preop. ED EKG interpretation:  Rhythm: normal sinus rhythm; and regular . Rate (approx.): 69; Axis: normal; P wave: normal; QRS interval: normal ; ST/T wave: normal; in  Lead: II ; Other findings: normal.   EKG documented by Oliva Aldana MD       [facility] Consult for Admission  5:57 AM    ED Room Number: ER13/13  Patient Name and age:  Ivan Foss 80 y.o.  female  Working Diagnosis:   1.  Type I or II open bimalleolar fracture of left ankle, initial encounter        COVID-19 Suspicion:  no  Sepsis present:  no  Reassessment needed: no  Code Status:  Full Code  Readmission: no  Isolation Requirements:  no  Recommended Level of Care:  med/surg  Department:[facility] - [phone]  Other: Total critical care time spent exclusive of procedures:  42 min.

## 2021-11-23 NOTE — PROGRESS NOTES
Hospitalist Progress Note    NAME: Philippe Thomas   :  1935   MRN:  [id number]     Subjective:   Daily Progress Note: 2021 10:32 AM      Chief complaint: admitted with L ankle fracture  Patient seen and examined, chart reviewed. Just came back from surgery. Spoke with son and family at bedside who want her to go to rehab.      Current Facility-Administered Medications   Medication Dose Route Frequency    sodium chloride (NS) flush 5-40 mL  5-40 mL IntraVENous Q8H    sodium chloride (NS) flush 5-40 mL  5-40 mL IntraVENous PRN    acetaminophen (TYLENOL) tablet 650 mg  650 mg Oral Q6H PRN    Or    acetaminophen (TYLENOL) suppository 650 mg  650 mg Rectal Q6H PRN    polyethylene glycol (MIRALAX) packet 17 g  17 g Oral DAILY PRN    senna (SENOKOT) tablet 8.6 mg  1 Tablet Oral DAILY PRN    promethazine (PHENERGAN) tablet 12.5 mg  12.5 mg Oral Q6H PRN    Or    ondansetron (ZOFRAN) injection 4 mg  4 mg IntraVENous Q6H PRN    morphine injection 2 mg  2 mg IntraVENous Q4H PRN    [START ON 2021] pantoprazole (PROTONIX) tablet 40 mg  40 mg Oral ACB    0.9% sodium chloride infusion  125 mL/hr IntraVENous CONTINUOUS    mirtazapine (REMERON) tablet 7.5 mg  7.5 mg Oral QHS    calcium-vitamin D (OS-OLGA LIDIA +D3) 500 mg-200 unit per tablet 1 Tablet  1 Tablet Oral DAILY          Objective:     Visit Vitals  /64   Pulse 72   Temp 97.5 °F (36.4 °C)   Resp 20   Wt 70.3 kg (155 lb)   SpO2 98%   BMI 28.35 kg/m²      O2 Device: None (Room air)    Temp (24hrs), Av.6 °F (36.4 °C), Min:97.5 °F (36.4 °C), Max:97.8 °F (36.6 °C)        PHYSICAL EXAM:  Gen WDWN  Neck supple  CVS regular rhythm  Resp symmetric expansion  Abd soft ND  Ext L LE dressing  Neuro alert  Psych normal affect      Data Review    Recent Results (from the past 24 hour(s))   EKG, 12 LEAD, INITIAL    Collection Time: 21  5:35 AM   Result Value Ref Range    Ventricular Rate 69 BPM    Atrial Rate 69 BPM    P-R Interval 170 ms    QRS Duration 96 ms    Q-T Interval 400 ms    QTC Calculation (Bezet) 428 ms    Calculated P Axis 70 degrees    Calculated R Axis -8 degrees    Calculated T Axis 46 degrees    Diagnosis       Normal sinus rhythm  Normal ECG  When compared with ECG of 31-OCT-2016 13:01,  No significant change was found     CBC WITH AUTOMATED DIFF    Collection Time: 11/23/21  5:46 AM   Result Value Ref Range    WBC 11.8 (H) 3.6 - 11.0 K/uL    RBC 4.58 3.80 - 5.20 M/uL    HGB 13.2 11.5 - 16.0 g/dL    HCT 40.7 35.0 - 47.0 %    MCV 88.9 80.0 - 99.0 FL    MCH 28.8 26.0 - 34.0 PG    MCHC 32.4 30.0 - 36.5 g/dL    RDW 13.3 11.5 - 14.5 %    PLATELET 915 770 - 702 K/uL    MPV 9.0 8.9 - 12.9 FL    NRBC 0.0 0  WBC    ABSOLUTE NRBC 0.00 0.00 - 0.01 K/uL    NEUTROPHILS 84 (H) 32 - 75 %    LYMPHOCYTES 9 (L) 12 - 49 %    MONOCYTES 6 5 - 13 %    EOSINOPHILS 0 0 - 7 %    BASOPHILS 0 0 - 1 %    IMMATURE GRANULOCYTES 1 (H) 0.0 - 0.5 %    ABS. NEUTROPHILS 9.9 (H) 1.8 - 8.0 K/UL    ABS. LYMPHOCYTES 1.1 0.8 - 3.5 K/UL    ABS. MONOCYTES 0.8 0.0 - 1.0 K/UL    ABS. EOSINOPHILS 0.0 0.0 - 0.4 K/UL    ABS. BASOPHILS 0.1 0.0 - 0.1 K/UL    ABS. IMM. GRANS. 0.1 (H) 0.00 - 0.04 K/UL    DF AUTOMATED     METABOLIC PANEL, COMPREHENSIVE    Collection Time: 11/23/21  5:46 AM   Result Value Ref Range    Sodium 139 136 - 145 mmol/L    Potassium 4.0 3.5 - 5.1 mmol/L    Chloride 108 97 - 108 mmol/L    CO2 26 21 - 32 mmol/L    Anion gap 5 5 - 15 mmol/L    Glucose 130 (H) 65 - 100 mg/dL    BUN 18 6 - 20 MG/DL    Creatinine 0.90 0.55 - 1.02 MG/DL    BUN/Creatinine ratio 20 12 - 20      GFR est AA >60 >60 ml/min/1.73m2    GFR est non-AA 59 (L) >60 ml/min/1.73m2    Calcium 9.1 8.5 - 10.1 MG/DL    Bilirubin, total 0.4 0.2 - 1.0 MG/DL    ALT (SGPT) 25 12 - 78 U/L    AST (SGOT) 22 15 - 37 U/L    Alk.  phosphatase 70 45 - 117 U/L    Protein, total 6.7 6.4 - 8.2 g/dL    Albumin 3.2 (L) 3.5 - 5.0 g/dL    Globulin 3.5 2.0 - 4.0 g/dL    A-G Ratio 0.9 (L) 1.1 - 2.2 COVID-19 RAPID TEST    Collection Time: 11/23/21  5:46 AM   Result Value Ref Range    Specimen source Nasopharyngeal      COVID-19 rapid test Not detected NOTD     SAMPLES BEING HELD    Collection Time: 11/23/21  5:46 AM   Result Value Ref Range    SAMPLES BEING HELD 1RED,1DRKGRN,1BLU     COMMENT        Add-on orders for these samples will be processed based on acceptable specimen integrity and analyte stability, which may vary by analyte. No results found for this visit on 11/23/21. All Micro Results     Procedure Component Value Units Date/Time    COVID-19 RAPID TEST [[id number]] Collected: 11/23/21 0546    Order Status: Completed Specimen: Nasopharyngeal Updated: 11/23/21 0622     Specimen source Nasopharyngeal        COVID-19 rapid test Not detected        Comment: Rapid Abbott ID Now       Rapid NAAT:  The specimen is NEGATIVE for SARS-CoV-2, the novel coronavirus associated with COVID-19. Negative results should be treated as presumptive and, if inconsistent with clinical signs and symptoms or necessary for patient management, should be tested with an alternative molecular assay. Negative results do not preclude SARS-CoV-2 infection and should not be used as the sole basis for patient management decisions. This test has been authorized by the FDA under an Emergency Use Authorization (EUA) for use by authorized laboratories. Fact sheet for Healthcare Providers: iTendency.uy  Fact sheet for Patients: iTendency.uy       Methodology: Isothermal Nucleic Acid Amplification                Radiology reports and films for the last 24 hours have been reviewed. Assessment/Plan:      Bimalleolar # L ankle  from  mechanical fall. S/p surgery 11/23/21.  Defer post OP care to ortho       GERD without esophagitis   PPI  HLD   not taking any medications  Chronic bilateral low back pain PT, OT when feasible   Subclinical hypothyroidism outpatient monitoring with PCP     Code Status:  Full  DVT PRx SCD  NOK son  Probable Disposition:  SNF/LTC    Signed By: Trini Marino MD     November 23, 2021

## 2021-11-23 NOTE — PROGRESS NOTES
Ortho:    [facility] consult from Dr. Coy Basurto who presents 79 y/o female with open ankle fracture s/p fall down the stairs. He already gave IV Ancef and updated tetanus. Dr. Joe Agustin is able to review xrays and picture of skin. He recs:  IV Gent 5mg/kg if renal function is OK  ER to irrigate wound, apply sterile dressing, and apply padded splint  Keep patient NPO  Admit to medicine  Will take to OR this am for formal washout and repair    Full note to follow.     DEANNA Houston

## 2021-11-23 NOTE — PROGRESS NOTES
Ortho:    Notified by RN of bleeding. Patient eating dinner. She states pain is well controlled. Son bedside. NAD. LLE elevated 3 pillows. I removed ACE. Appears oozing from laceration site, not a pin site. Toes well perfused. I reinforced surgical dressing with ABDs. I applied drain sponges to pin sites. Over-wrapped with ACE X 2. Care to avoid pressure posterior heel. Stressed ice, elevation, NWB LLE, abx 48 hours. Likely to SNF, then see Dr. Toya Denton in office 11/30, followed by [facility] 12/2 if skin is ready. Patient/family understand.     Oscar Lundborg, PA

## 2021-11-23 NOTE — CONSULTS
Ortho Consult    Full note dictated: [id number]    79 yo F with type II open LEFT bimalleolar ankle fx    IV ancef/gent given in ED, tetanus given  Wound washed/dressed/ankle splinted in ED  Admitted to hospitalist    Plan surgery this am for I&D of ankle wound, closure, closed reduction and ex-fix  NPO  bedrest

## 2021-11-23 NOTE — PROGRESS NOTES
[facility]: TRANSFER MED RECONCILIATION    Comments/Recommendations:     Appreciate nursing review of PTA med list    Information obtained from: nursing med rec    Significant PMH/Disease States:   Past Medical History:   Diagnosis Date    Arthritis     Gastrointestinal disorder     gerd    GERD (gastroesophageal reflux disease)     Ill-defined condition     cholesterol     Chief Complaint for this Admission:   Chief Complaint   Patient presents with    Ankle Injury    Skin Problem     Allergies: Statins-hmg-coa reductase inhibitors    Prior to Admission Medications:     Medication Documentation Review Audit       Reviewed by NAZIA ClayD (Pharmacist) on 11/23/21 at 0857      Medication Sig Documenting Provider Last Dose Status Taking?   acyclovir (ZOVIRAX) 400 mg tablet TAKE 1 TABLET BY MOUTH 2 TIMES A DAY TO PREVENT COLD SORES Harman Ma MD  Active Yes   Biotin 2,500 mcg cap Take 2,500 mcg by mouth daily. Provider, Historical  Active Yes   Calcium-Cholecalciferol, D3, 500 mg(1,250mg) -125 unit tab 1 per day Marietta Leon MD  Active Yes           Med Note (Carroll Spatz Sep 15, 2020  3:37 PM)     diclofenac (VOLTAREN) 1 % gel Apply 1 g to affected area four (4) times daily. Marietta Leon MD  Active Yes   DOCOSAHEXANOIC ACID/EPA (FISH OIL PO) Take 2 Capsules by mouth daily. Nolan Nava MD  Active Yes   ibuprofen (MOTRIN) 600 mg tablet TAKE ONE TABLET BY MOUTH EVERY SIX HOURS AS NEEDED for pain Harman Ma MD  Active Yes   mirtazapine (REMERON) 7.5 mg tablet Take 1 Tab by mouth nightly. Marietta Leon MD  Active Yes   multivitamin (ONE A DAY) tablet Take 1 Tablet by mouth daily. Marietta Leon MD  Active Yes   omeprazole (PRILOSEC) 20 mg capsule Take 1 Cap by mouth daily. Marietta Leon MD  Active Yes           Med Note (Elaine 8 Tue Nov 23, 2021  8:57 AM)     red yeast rice extract 600 mg cap Take 600 mg by mouth two (2) times a day.  Brandy, MD Nolan  Active Yes                  Thank you,  Isaiah Solitario

## 2021-11-23 NOTE — ED NOTES
TRANSFER - OUT REPORT:    Verbal report given to [place] (name) on Jennifer Jaime  being transferred to OR(unit) for routine progression of care       Report consisted of patients Situation, Background, Assessment and   Recommendations(SBAR). Information from the following report(s) SBAR, Kardex, ED Summary, Procedure Summary and Intake/Output was reviewed with the receiving nurse. Lines:   Peripheral IV 11/23/21 Distal; Left; Posterior Forearm (Active)        Opportunity for questions and clarification was provided.       Patient transported with:   Monitor

## 2021-11-23 NOTE — ANESTHESIA PREPROCEDURE EVALUATION
Relevant Problems   No relevant active problems       Anesthetic History   No history of anesthetic complications            Review of Systems / Medical History  Patient summary reviewed, nursing notes reviewed and pertinent labs reviewed    Pulmonary  Within defined limits                 Neuro/Psych   Within defined limits           Cardiovascular  Within defined limits                     GI/Hepatic/Renal  Within defined limits   GERD           Endo/Other  Within defined limits    Hypothyroidism  Arthritis     Other Findings              Physical Exam    Airway  Mallampati: II  TM Distance: 4 - 6 cm  Neck ROM: normal range of motion   Mouth opening: Normal     Cardiovascular    Rhythm: regular  Rate: normal         Dental  No notable dental hx       Pulmonary  Breath sounds clear to auscultation               Abdominal         Other Findings            Anesthetic Plan    ASA: 2  Anesthesia type: spinal            Anesthetic plan and risks discussed with: Patient

## 2021-11-23 NOTE — ANESTHESIA POSTPROCEDURE EVALUATION
Procedure(s):  LEFT ANKLE WASHOUT WITH EXTERNAL FIXATION. spinal    Anesthesia Post Evaluation      Multimodal analgesia: multimodal analgesia used between 6 hours prior to anesthesia start to PACU discharge  Patient location during evaluation: bedside  Patient participation: complete - patient participated  Level of consciousness: awake  Pain management: adequate  Airway patency: patent  Anesthetic complications: no  Cardiovascular status: acceptable  Respiratory status: acceptable  Hydration status: acceptable        INITIAL Post-op Vital signs:   Vitals Value Taken Time   /56 11/23/21 0945   Temp 36.4 °C (97.5 °F) 11/23/21 0945   Pulse 70 11/23/21 0947   Resp 20 11/23/21 0947   SpO2 98 % 11/23/21 0950   Vitals shown include unvalidated device data.

## 2021-11-23 NOTE — PROGRESS NOTES
Physical Therapy Note:    Orders acknowledged, chart reviewed, discussed with RN. PT evaluation deferred. Pt is POD 0 s/p L ankle I&D and external fixation with PT order start date of today. Pt declining PT evaluation at this time, requesting to rest after eventful day. Pt LLE appropriately elevated and RN present to reinforce surgical dressing. Son reports anticipating pt will discharge to rehab for a few days before pt returns to [facility] for surgical stabilization of ankle fracture. Will continue to follow and proceed with PT evaluation when appropriate.     Zachariah Ayers, PT, DPT, Nely Montesinos

## 2021-11-23 NOTE — PROGRESS NOTES
Occupational Therapy    Acknowledge OT order and completed chart review. Patient is POD 0 from ankle fx repair.        Thank you,    Barbara Salgado, OT

## 2021-11-23 NOTE — ED TRIAGE NOTES
Pt. Douglas Millss in by EMS For fall down stairs, pt. Has swelling and skin tear noted to left ankle. No LOC or head injury.

## 2021-11-23 NOTE — ANESTHESIA PROCEDURE NOTES
Spinal Block    Start time: 11/23/2021 7:55 AM  End time: 11/23/2021 8:00 AM  Performed by: Hafsa Hernandez MD  Authorized by: Hafsa Hernandez MD     Pre-procedure:   Indications: primary anesthetic  Preanesthetic Checklist: patient identified, risks and benefits discussed, anesthesia consent, site marked, patient being monitored and timeout performed      Spinal Block:   Patient Position:  Seated        Location:  L3-4  Technique:  Single shot    Local Dose (mL):  3    Needle:   Needle Type:  Quincke  Needle Gauge:  22 G  Attempts:  1    Catheter at Skin Depth (cm):  4  Events: CSF confirmed, no blood with aspiration and no paresthesia        Assessment:  Insertion:  Uncomplicated  Patient tolerance:  Patient tolerated the procedure well with no immediate complications

## 2021-11-23 NOTE — OP NOTES
[facility]  OPERATIVE REPORT    Name:  Analia Gillette  MR#:  [id number]  :  1935  ACCOUNT #:  [de-identified]  DATE OF SERVICE:  2021    PREOPERATIVE DIAGNOSIS:  Left type 2 open bimalleolar ankle fracture. POSTOPERATIVE DIAGNOSIS:  Left type 2 open trimalleolar ankle fracture. PROCEDURES PERFORMED:  1. Left ankle open fracture irrigation and excisional debridement of skin, subcutaneous tissue and periosteum. 2.  Closed reduction and external fixation of type 2 open left trimalleolar ankle fracture. 3.  Independent interpretation of intraoperative fluoroscopy. SURGEON:  Eileen Pandya MD    ASSISTANT:  None. ANESTHESIA:  Spinal.    COMPLICATIONS:  None. SPECIMENS REMOVED:  None. IMPLANTS:  [place] large external fixator. ESTIMATED BLOOD LOSS:  Less than 5 mL. DRAINS:  None. FINDINGS:  Left type 2 open trimalleolar ankle fracture. The transverse medial ankle wound measured 3 cm. TOURNIQUET TIME:  HemaClear tourniquet for 35 minutes. INDICATIONS FOR PROCEDURE:  This is an 68-year-old female who suffered a ground-level fall and a type 2 open trimalleolar ankle fracture. I recommended urgent surgical intervention treatment with excisional debridement and irrigation of the skin, subcutaneous tissue and bone; as well as closure of the wound and external fixation of her ankle fracture. I discussed the risks of surgery which include, but not limited to, complications of anesthesia including death, pain, bleeding, infection, damage to surrounding structures, nonunion, malunion, DVT, PE, wound healing problems, ankle arthritis, continued pain, and the need for further surgery. The patient verbalized understanding and elected to proceed with surgical intervention. PROCEDURE:  The correct patient, extremity, and operation were identified in the preoperative holding area and I marked her left ankle.   The Anesthesia team took the patient back to the operating room, placed her on the operating room table, and provided a successful spinal anesthetic. The left lower extremity was then prepped and draped in the usual sterile fashion and a preoperative time-out was called. Again, the correct patient, extremity, and operation were identified, all parties were in agreement, and we proceeded with the operation  The patient received IV Ancef and gentamicin for treatment of her type 2 open fracture. A HemaClear tourniquet was then placed on the left lower extremity and the left lower extremity was exsanguinated and the tourniquet was used. Attention was turned to the open medial ankle wound which measured approximately 3 cm and was transverse over the medial malleolus. This wound was extended both proximally and distally and the wound was then copiously irrigated. A thorough excisional debridement of skin, subcutaneous tissue and periosteum was undertaken and the joint was copiously lavaged. The open fracture was also copiously lavaged and curetted of intervening hematoma and periosteum. The wound was then closed with 3-0 nylon suture. Two half pins were placed in the proximal tibia bicortically and confirmed to be in satisfactory position with fluoroscopic imaging in AP and lateral planes through two small stab incisions. The calcaneus pin was placed in the tuberosity from medial to lateral under fluoroscopic guidance. The pin-to-bar and bar-to-bar clamps were then placed. The external fixator was assembled. A longitudinal traction was placed on the ankle and fluoroscopic images of the ankle were independently interpreted by me in the AP, mortise and lateral planes which showed satisfactory reduction of the trimalleolar ankle fracture with restoration of the ankle mortise. The pin-to-bar and bar-to-bar clamps were tightened down. The kickstand was also placed to prevent heel sores.   Xeroform was placed over the pin sites and over the open wound that had been closed. The wounds were dressed. The tourniquet was dropped and the patient was awakened from anesthesia and taken to the recovery room in hemodynamically stable condition. All lap and sharp counts were correct at the end of case. POSTOPERATIVE CARE:  She will be nonweightbearing to the left lower extremity. She will start Lovenox 40 mg subcu daily for DVT prophylaxis starting on the morning of postop day 1. She will be nonweightbearing to the left lower extremity and she will be admitted back to the Hospitalist service. We will plan for followup in the . [facility], [phone], next Tuesday, 11/30/2021, for a wound check and plan for definitive removal of the external fixator and ORIF of her trimalleolar ankle fracture next Thursday if her wound is healing well.       Sky Bey MD      [id number]  D:  11/23/2021 9:11  T:  11/23/2021 16:46  JOB #:  [id number]

## 2021-11-23 NOTE — CONSULTS
[address]     Name:  Carly Hussein  MR#:  [id number]  :  1935  ACCOUNT #:  [de-identified]  DATE OF SERVICE:  2021      CONSULTING PHYSICIAN:  Vipin Rodriguez MD in Emergency Department. REASON FOR CONSULTATION:  Open left bimalleolar ankle fracture. HISTORY OF PRESENT ILLNESS:  This is an 70-year-old female who suffered a ground-level fall earlier this evening, was taken to [facility] Emergency Department for an open wound and x-ray showed a bimalleolar ankle fracture. She was washed out, given Ancef and gentamicin, dressed and splinted in the emergency department and admitted to the hospitalist service. I was called for management of her Orthopedic wounds. She complains of sharp stabbing left ankle pain, this started after a fall. Otherwise, she has no other musculoskeletal complaints. She denies history of blood clots and is not taking any anticoagulant. PAST MEDICAL HISTORY:  GERD and hypercholesterolemia. PAST SURGICAL HISTORY:  Breast biopsy, right shoulder surgery and [place] teeth extraction. SOCIAL HISTORY:  Nonsmoker and drinks alcohol weekly. FAMILY HISTORY:  Family history of hypertension, kidney disease and cancer. REVIEW OF SYSTEMS:  Positive for left ankle pain with open wound. Otherwise, 10-point review of systems is negative. PHYSICAL EXAMINATION:  VITAL SIGNS:  Temperature 97.8, pulse 70, blood pressure 118/54, respirations 17, oxygen saturation 100% on room air. GENERAL:  This is an elderly  female, in no acute distress. She is alert, cooperative on examination. HEENT:  Her head is normocephalic, atraumatic. Sclerae is anicteric. CHEST:  Breathing is unlabored. ABDOMEN:  Soft and nontender.   EXTREMITIES:  Focused musculoskeletal examination of the left lower extremity, she has a splint in place, but I examined pictures of her ankle, which shows approximately 2 cm transverse laceration directly over the medial malleolus. There is moderate swelling of the ankle. No other open wounds. She is able to dorsiflex and plantar flex her toes. Sensation is grossly intact to light touch and she has brisk capillary refill in the toes. Her extensor mechanisms are intact in her knee. Her thigh, calf, leg, foot, and gluteal compartments are soft and easily compressible. No pain with logroll of the hip. Right lower extremity evaluation, there is no pain with logroll of the hip. Thigh, calf, leg, gluteal and foot compartments are soft and easily compressible. She is able to dorsiflex and plantar flex her toes and her ankle. Bilateral upper extremity evaluation, no pain, crepitation, tenderness to palpation or deformities noted about bilateral clavicle, shoulders, arms, elbows, forearms, wrists, hands, or fingers. Positive gross motor and sensation in median, radial, ulnar nerve distributions with 2+ radial pulses. LABORATORY EVALUATION:  White blood cell count is 11.8, hemoglobin is 13.3, hematocrit is 40.7, platelets are 913. Sodium 139, potassium 4.0, chloride 108, CO2 26. BUN 18, creatinine 0.9, glucose is 130. X-ray evaluation shows a minimally displaced bimalleolar ankle fracture. ASSESSMENT AND PLAN:  An 60-year-old female with a type II open left bimalleolar ankle fracture. She has been admitted to the hospitalist service. She will remain n.p.o. She also has been washed out and given appropriate IV antibiotic coverage for open ankle fracture. She has been given Ancef and gentamicin 5 mg/kg. We will plan for surgery this morning for irrigation and excisional debridement of her type II open fracture with closure of the wound and external fixation and closed reduction.   I discussed the risks of surgery, which include but not limited to complications from anesthesia including death, pain, bleeding, infection, damage to surrounding structures, nonunion, malunion, DVT, PE, wound healing problems and need for further surgery. The patient verbalized understanding and elected to proceed.       Ashlee Dutta MD      [id number]  D:  11/23/2021 7:25  T:  11/23/2021 12:36  JOB #:  [id number]

## 2021-11-23 NOTE — BRIEF OP NOTE
Brief Postoperative Note    Patient: Etienne More  YOB: 1935  MRN: [id number]    Date of Procedure: 11/23/2021     Pre-Op Diagnosis:    Left type 2 open bimalleolar ankle fracture    Post-Op Diagnosis:     Left type 2 open Trimalleolar ankle fracture    Procedure(s):  1. LEFT ANKLE open fracture Irrigation and excisional debridement of skin, subcutaneous tissue and periosteum  2. Closed reduction and external fixation of type 2 open LEFT trimalleolar ankle fracture    3. Independent interpretation of intra-operative fluoroscopy    Surgeon(s): Geo Morgan MD    Surgical Assistant: None    Anesthesia: spinal    Estimated Blood Loss (mL): <4BD    Complications: None    Specimens: * No specimens in log *     Implants:   Implant Name Type Inv. Item Serial No.  Lot No. LRB No. Used Action   Keno Pins [id number]   N/A [employer] N/A Left 2 Implanted   6.0 Calc Pin   N/A [employer] NA Left 1 Implanted   EDDIE TO EDDIE COUPLING   N/A [employer] N/A Left 6 Implanted   5 H PIN CLAMP   N/A [employer] N/A Left 1 Implanted          1 Implanted     [place] large ex-fix    Drains: * No LDAs found *    Findings: LEFT type 2 open trimalleolar ankle fx. Transverse medial ankle Wound measured 3cm     TT: hemaclear thigh x 35min    Electronically Signed by Leanne Torrez MD on 11/23/2021 at 9:03 AM

## 2021-11-24 LAB
ALBUMIN SERPL-MCNC: 2.7 G/DL (ref 3.5–5)
ALBUMIN/GLOB SERPL: 1 {RATIO} (ref 1.1–2.2)
ALP SERPL-CCNC: 60 U/L (ref 45–117)
ALT SERPL-CCNC: 20 U/L (ref 12–78)
ANION GAP SERPL CALC-SCNC: 3 MMOL/L (ref 5–15)
AST SERPL-CCNC: 18 U/L (ref 15–37)
ATRIAL RATE: 69 BPM
BASOPHILS # BLD: 0 K/UL (ref 0–0.1)
BASOPHILS NFR BLD: 1 % (ref 0–1)
BILIRUB SERPL-MCNC: 0.4 MG/DL (ref 0.2–1)
BUN SERPL-MCNC: 14 MG/DL (ref 6–20)
BUN/CREAT SERPL: 15 (ref 12–20)
CALCIUM SERPL-MCNC: 8.2 MG/DL (ref 8.5–10.1)
CALCULATED P AXIS, ECG09: 70 DEGREES
CALCULATED R AXIS, ECG10: -8 DEGREES
CALCULATED T AXIS, ECG11: 46 DEGREES
CHLORIDE SERPL-SCNC: 111 MMOL/L (ref 97–108)
CO2 SERPL-SCNC: 27 MMOL/L (ref 21–32)
CREAT SERPL-MCNC: 0.93 MG/DL (ref 0.55–1.02)
DIAGNOSIS, 93000: NORMAL
DIFFERENTIAL METHOD BLD: ABNORMAL
EOSINOPHIL # BLD: 0.1 K/UL (ref 0–0.4)
EOSINOPHIL NFR BLD: 2 % (ref 0–7)
ERYTHROCYTE [DISTWIDTH] IN BLOOD BY AUTOMATED COUNT: 13.3 % (ref 11.5–14.5)
GLOBULIN SER CALC-MCNC: 2.7 G/DL (ref 2–4)
GLUCOSE BLD STRIP.AUTO-MCNC: 106 MG/DL (ref 65–117)
GLUCOSE BLD STRIP.AUTO-MCNC: 88 MG/DL (ref 65–117)
GLUCOSE BLD STRIP.AUTO-MCNC: 99 MG/DL (ref 65–117)
GLUCOSE SERPL-MCNC: 110 MG/DL (ref 65–100)
HCT VFR BLD AUTO: 32.3 % (ref 35–47)
HGB BLD-MCNC: 10.2 G/DL (ref 11.5–16)
IMM GRANULOCYTES # BLD AUTO: 0 K/UL (ref 0–0.04)
IMM GRANULOCYTES NFR BLD AUTO: 0 % (ref 0–0.5)
LYMPHOCYTES # BLD: 1.4 K/UL (ref 0.8–3.5)
LYMPHOCYTES NFR BLD: 20 % (ref 12–49)
MCH RBC QN AUTO: 28.7 PG (ref 26–34)
MCHC RBC AUTO-ENTMCNC: 31.6 G/DL (ref 30–36.5)
MCV RBC AUTO: 90.7 FL (ref 80–99)
MONOCYTES # BLD: 0.7 K/UL (ref 0–1)
MONOCYTES NFR BLD: 10 % (ref 5–13)
NEUTS SEG # BLD: 4.6 K/UL (ref 1.8–8)
NEUTS SEG NFR BLD: 67 % (ref 32–75)
NRBC # BLD: 0 K/UL (ref 0–0.01)
NRBC BLD-RTO: 0 PER 100 WBC
P-R INTERVAL, ECG05: 170 MS
PLATELET # BLD AUTO: 205 K/UL (ref 150–400)
PMV BLD AUTO: 9.1 FL (ref 8.9–12.9)
POTASSIUM SERPL-SCNC: 4.3 MMOL/L (ref 3.5–5.1)
PROT SERPL-MCNC: 5.4 G/DL (ref 6.4–8.2)
Q-T INTERVAL, ECG07: 400 MS
QRS DURATION, ECG06: 96 MS
QTC CALCULATION (BEZET), ECG08: 428 MS
RBC # BLD AUTO: 3.56 M/UL (ref 3.8–5.2)
SERVICE CMNT-IMP: NORMAL
SODIUM SERPL-SCNC: 141 MMOL/L (ref 136–145)
VENTRICULAR RATE, ECG03: 69 BPM
WBC # BLD AUTO: 6.9 K/UL (ref 3.6–11)

## 2021-11-24 PROCEDURE — 74011000250 HC RX REV CODE- 250: Performed by: PHYSICIAN ASSISTANT

## 2021-11-24 PROCEDURE — 97530 THERAPEUTIC ACTIVITIES: CPT

## 2021-11-24 PROCEDURE — 94760 N-INVAS EAR/PLS OXIMETRY 1: CPT

## 2021-11-24 PROCEDURE — 74011250636 HC RX REV CODE- 250/636: Performed by: PHYSICIAN ASSISTANT

## 2021-11-24 PROCEDURE — 97161 PT EVAL LOW COMPLEX 20 MIN: CPT

## 2021-11-24 PROCEDURE — 74011250637 HC RX REV CODE- 250/637: Performed by: HOSPITALIST

## 2021-11-24 PROCEDURE — 97535 SELF CARE MNGMENT TRAINING: CPT | Performed by: OCCUPATIONAL THERAPIST

## 2021-11-24 PROCEDURE — 65270000029 HC RM PRIVATE

## 2021-11-24 PROCEDURE — 97165 OT EVAL LOW COMPLEX 30 MIN: CPT | Performed by: OCCUPATIONAL THERAPIST

## 2021-11-24 PROCEDURE — 74011250637 HC RX REV CODE- 250/637: Performed by: ORTHOPAEDIC SURGERY

## 2021-11-24 PROCEDURE — 80053 COMPREHEN METABOLIC PANEL: CPT

## 2021-11-24 PROCEDURE — 74011000258 HC RX REV CODE- 258: Performed by: ORTHOPAEDIC SURGERY

## 2021-11-24 PROCEDURE — 36415 COLL VENOUS BLD VENIPUNCTURE: CPT

## 2021-11-24 PROCEDURE — 82962 GLUCOSE BLOOD TEST: CPT

## 2021-11-24 PROCEDURE — 74011250637 HC RX REV CODE- 250/637: Performed by: INTERNAL MEDICINE

## 2021-11-24 PROCEDURE — 74011250637 HC RX REV CODE- 250/637: Performed by: PHYSICIAN ASSISTANT

## 2021-11-24 PROCEDURE — 85025 COMPLETE CBC W/AUTO DIFF WBC: CPT

## 2021-11-24 PROCEDURE — 74011250636 HC RX REV CODE- 250/636: Performed by: ORTHOPAEDIC SURGERY

## 2021-11-24 PROCEDURE — 2709999900 HC NON-CHARGEABLE SUPPLY

## 2021-11-24 RX ADMIN — Medication 1 TABLET: at 16:04

## 2021-11-24 RX ADMIN — GENTAMICIN SULFATE 250 MG: 40 INJECTION, SOLUTION INTRAMUSCULAR; INTRAVENOUS at 08:35

## 2021-11-24 RX ADMIN — Medication 10 ML: at 07:02

## 2021-11-24 RX ADMIN — DOCUSATE SODIUM 50MG AND SENNOSIDES 8.6MG 1 TABLET: 8.6; 5 TABLET, FILM COATED ORAL at 08:25

## 2021-11-24 RX ADMIN — OXYCODONE 5 MG: 5 TABLET ORAL at 11:59

## 2021-11-24 RX ADMIN — WATER 2 G: 1 INJECTION INTRAMUSCULAR; INTRAVENOUS; SUBCUTANEOUS at 20:56

## 2021-11-24 RX ADMIN — WATER 2 G: 1 INJECTION INTRAMUSCULAR; INTRAVENOUS; SUBCUTANEOUS at 04:55

## 2021-11-24 RX ADMIN — Medication 10 ML: at 14:27

## 2021-11-24 RX ADMIN — Medication 5 ML: at 21:01

## 2021-11-24 RX ADMIN — PANTOPRAZOLE SODIUM 40 MG: 40 TABLET, DELAYED RELEASE ORAL at 07:02

## 2021-11-24 RX ADMIN — POLYETHYLENE GLYCOL 3350 17 G: 17 POWDER, FOR SOLUTION ORAL at 08:26

## 2021-11-24 RX ADMIN — ENOXAPARIN SODIUM 40 MG: 100 INJECTION SUBCUTANEOUS at 08:26

## 2021-11-24 RX ADMIN — MIRTAZAPINE 7.5 MG: 15 TABLET, FILM COATED ORAL at 21:01

## 2021-11-24 RX ADMIN — ACETAMINOPHEN 650 MG: 325 TABLET ORAL at 13:00

## 2021-11-24 RX ADMIN — STANDARDIZED SENNA CONCENTRATE 8.6 MG: 8.6 TABLET ORAL at 22:51

## 2021-11-24 RX ADMIN — ACETAMINOPHEN 650 MG: 325 TABLET ORAL at 02:28

## 2021-11-24 RX ADMIN — TRAMADOL HYDROCHLORIDE 50 MG: 50 TABLET, FILM COATED ORAL at 08:25

## 2021-11-24 RX ADMIN — Medication 1 TABLET: at 08:25

## 2021-11-24 RX ADMIN — SODIUM CHLORIDE 125 ML/HR: 9 INJECTION, SOLUTION INTRAVENOUS at 05:05

## 2021-11-24 RX ADMIN — WATER 2 G: 1 INJECTION INTRAMUSCULAR; INTRAVENOUS; SUBCUTANEOUS at 11:59

## 2021-11-24 RX ADMIN — Medication 1 TABLET: at 11:59

## 2021-11-24 RX ADMIN — ACETAMINOPHEN 650 MG: 325 TABLET ORAL at 07:02

## 2021-11-24 RX ADMIN — OXYCODONE 5 MG: 5 TABLET ORAL at 22:51

## 2021-11-24 RX ADMIN — DOCUSATE SODIUM 50MG AND SENNOSIDES 8.6MG 1 TABLET: 8.6; 5 TABLET, FILM COATED ORAL at 18:01

## 2021-11-24 NOTE — PROGRESS NOTES
Problem: Pain  Goal: *Control of Pain  Outcome: Progressing Towards Goal  Goal: *PALLIATIVE CARE:  Alleviation of Pain  Outcome: Progressing Towards Goal     Problem: Patient Education: Go to Patient Education Activity  Goal: Patient/Family Education  Outcome: Progressing Towards Goal     Problem: Falls - Risk of  Goal: *Absence of Falls  Description: Document   Fall Risk and appropriate interventions in the flowsheet. Outcome: Progressing Towards Goal  Note: Fall Risk Interventions:   Free of falls                               Problem: Patient Education: Go to Patient Education Activity  Goal: Patient/Family Education  Outcome: Progressing Towards Goal     Problem: Body Temperature -  Risk of, Imbalanced  Goal: *Absence of heat stress or hyperthermia signs and symptoms  Outcome: Progressing Towards Goal     Problem: Pressure Injury - Risk of  Goal: *Prevention of pressure injury  Description: Document Eligio Scale and appropriate interventions in the flowsheet.   Outcome: Progressing Towards Goal  Note: Pressure Injury Interventions:  Sensory Interventions: Minimize linen layers         Activity Interventions: PT/OT evaluation    Mobility Interventions: HOB 30 degrees or less    Nutrition Interventions: Document food/fluid/supplement intake                     Problem: Patient Education: Go to Patient Education Activity  Goal: Patient/Family Education  Outcome: Progressing Towards Goal

## 2021-11-24 NOTE — PROGRESS NOTES
Problem: Mobility Impaired (Adult and Pediatric)  Goal: *Acute Goals and Plan of Care (Insert Text)  Description: FUNCTIONAL STATUS PRIOR TO ADMISSION: Patient was independent and active without use of DME.    HOME SUPPORT PRIOR TO ADMISSION: The patient lived with  but did not require assist. Pt's  has dementia and pt is the caregiver. Physical Therapy Goals  Initiated 11/24/2021  1. Patient will move from supine to sit and sit to supine , scoot up and down, and roll side to side in bed with modified independence within 7 day(s). 2.  Patient will transfer from bed to chair and chair to bed with supervision/set-up using the least restrictive device within 7 day(s). 3.  Patient will perform sit to stand with supervision/set-up within 7 day(s). 4.  Patient will ambulate with moderate assist for 5 feet with the least restrictive device within 7 day(s). Outcome: Progressing Towards Goal   PHYSICAL THERAPY EVALUATION  Patient: Jim Ashley (24 y.o. female)  Date: 11/24/2021  Primary Diagnosis: Bimalleolar fracture of left ankle [S82.842A]  Procedure(s) (LRB):  LEFT ANKLE WASHOUT WITH EXTERNAL FIXATION (Left) 1 Day Post-Op   Precautions: NWB    ASSESSMENT  Based on the objective data described below, the patient presents with limited mobility 2/2 NWB restrictions following L ankle external fixation POD#1. Pt requires up to Mod A to SPT towards the L to transfer to the recliner chair. Performed second transfer to the R with Min A x 1. Unable to clear R foot to hop this day. Pt was highly independent and the caregiver for her  prior to fall down the steps leading to admission. Plan to discharge to SNF until second surgery to stabilize L ankle. Up in chair with pillows propping L LE and ice applied. Current Level of Function Impacting Discharge (mobility/balance): NWB awaiting 2nd surgery    Functional Outcome Measure:   The patient scored 4/28 on the Tinetti outcome measure which is indicative of high fall risk. Other factors to consider for discharge: NWB,  requires assistance     Patient will benefit from skilled therapy intervention to address the above noted impairments. PLAN :  Recommendations and Planned Interventions: bed mobility training, transfer training, gait training, therapeutic exercises, neuromuscular re-education, patient and family training/education, and therapeutic activities      Frequency/Duration: Patient will be followed by physical therapy:  daily to address goals. Recommendation for discharge: (in order for the patient to meet his/her long term goals)  Therapy up to 5 days/week in SNF setting    This discharge recommendation:  Has been made in collaboration with the attending provider and/or case management    IF patient discharges home will need the following DME: bedside commode, rolling walker, and wheelchair         SUBJECTIVE:   Patient stated I told myself two hands on the railing right before I fell.     OBJECTIVE DATA SUMMARY:   HISTORY:    Past Medical History:   Diagnosis Date    Arthritis     Gastrointestinal disorder     gerd    GERD (gastroesophageal reflux disease)     Ill-defined condition     cholesterol     Past Surgical History:   Procedure Laterality Date    COLONOSCOPY N/A 1/4/2019    Dr. Cory Paulino, no repeat recommended    HX BREAST BIOPSY  10-15 yrs ago    neg path, unsure which breast    HX COLONOSCOPY  01/01/2012    approx date, normal    HX ENDOSCOPY      no ulcers    HX ORTHOPAEDIC      right shoulder    HX WISDOM TEETH EXTRACTION         Personal factors and/or comorbidities impacting plan of care: arthritis    Home Situation  Home Environment: Private residence  # Steps to Enter: 4  Rails to Enter: Yes  Hand Rails : Bilateral (wide)  One/Two Story Residence: Other (Comment) (split level)  Living Alone: No  Support Systems: Spouse/Significant Other ( has dementia)  Patient Expects to be Discharged to[de-identified] Skilled nursing facility  Current DME Used/Available at Home: Sheriesther Barriosshaneka tristan, [address] chair, Grab bars  Tub or Shower Type: Shower    EXAMINATION/PRESENTATION/DECISION MAKING:   Critical Behavior:  Neurologic State: Alert  Orientation Level: Oriented X4  Cognition: Follows commands     Hearing:     Skin:    Edema:   Range Of Motion:  AROM: Generally decreased, functional                       Strength:    Strength: Generally decreased, functional                 Vision:      Functional Mobility:  Bed Mobility:  Rolling: Contact guard assistance  Supine to Sit: Contact guard assistance     Scooting: Minimum assistance  Transfers:  Sit to Stand: Minimum assistance  Stand to Sit: Minimum assistance  Stand Pivot Transfers: Moderate assistance     Bed to Chair: Moderate assistance              Balance:   Sitting: Intact  Standing: Impaired  Standing - Static: Fair  Ambulation/Gait Training:                       Left Side Weight Bearing: Non-weight bearing                                 Stairs: Therapeutic Exercises:       Functional Measure:  Tinetti test:    Sitting Balance: 1  Arises: 1  Attempts to Rise: 0  Immediate Standing Balance: 1  Standing Balance: 0  Nudged: 0  Eyes Closed: 0  Turn 360 Degrees - Continuous/Discontinuous: 0  Turn 360 Degrees - Steady/Unsteady: 0  Sitting Down: 1  Balance Score: 4 Balance total score  Indication of Gait: 0  R Step Length/Height: 0  L Step Length/Height: 0  R Foot Clearance: 0  L Foot Clearance: 0  Step Symmetry: 0  Step Continuity: 0  Path: 0  Trunk: 0  Walking Time: 0  Gait Score: 0 Gait total score  Total Score: 4/28 Overall total score         Tinetti Tool Score Risk of Falls  <19 = High Fall Risk  19-24 = Moderate Fall Risk  25-28 = Low Fall Risk  Tinetti ME. Performance-Oriented Assessment of Mobility Problems in Elderly Patients. Malcolm 66; [id number].  (Scoring Description: PT Bulletin Feb. 10, 1993)    Older adults: Jana Arriola et al, 2009; n = [address] [employer] elderly evaluated with ABC, KERRY, ADL, and IADL)  · Mean KERRY score for males aged 69-68 years = 26.21(3.40)  · Mean KERRY score for females age 69-68 years = 25.16(4.30)  · Mean KERRY score for males over 80 years = 23.29(6.02)  · Mean KERRY score for females over 80 years = 17.20(8.32)            Physical Therapy Evaluation Charge Determination   History Examination Presentation Decision-Making   MEDIUM  Complexity : 1-2 comorbidities / personal factors will impact the outcome/ POC  MEDIUM Complexity : 3 Standardized tests and measures addressing body structure, function, activity limitation and / or participation in recreation  LOW Complexity : Stable, uncomplicated  Other outcome measures Tinetti  LOW       Based on the above components, the patient evaluation is determined to be of the following complexity level: LOW     Pain Rating:  None reported    Activity Tolerance:   Fair    After treatment patient left in no apparent distress:   Sitting in chair, Heels elevated for pressure relief, Call bell within reach, and Caregiver / family present    COMMUNICATION/EDUCATION:   The patients plan of care was discussed with: Registered nurse. Fall prevention education was provided and the patient/caregiver indicated understanding., Patient/family have participated as able in goal setting and plan of care. , and Patient/family agree to work toward stated goals and plan of care.     Thank you for this referral.  Elina Tapia, PT   Time Calculation: 48 mins

## 2021-11-24 NOTE — PROGRESS NOTES
Problem: Falls - Risk of  Goal: *Absence of Falls  Description: Document Bard  Fall Risk and appropriate interventions in the flowsheet.   Outcome: Progressing Towards Goal  Note: Fall Risk Interventions: call light within reach

## 2021-11-24 NOTE — PROGRESS NOTES
Problem: Falls - Risk of  Goal: *Absence of Falls  Description: Document Shorty Sol Fall Risk and appropriate interventions in the flowsheet.   Outcome: Progressing Towards Goal  Note: Fall Risk Interventions:  Mobility Interventions: Patient to call before getting OOB    Mentation Interventions: Adequate sleep, hydration, pain control    Medication Interventions: Teach patient to arise slowly    Elimination Interventions: Call light in reach, Bed/chair exit alarm    History of Falls Interventions: Vital signs minimum Q4HRs X 24 hrs (comment for end date)

## 2021-11-24 NOTE — PROGRESS NOTES
Problem: Self Care Deficits Care Plan (Adult)  Goal: *Acute Goals and Plan of Care (Insert Text)  Description:   FUNCTIONAL STATUS PRIOR TO ADMISSION: Patient was independent and active without use of DME. 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 Occupational Therapy Goals  Initiated 11/24/2021  1. Patient will perform stand pivot transfer to and from bedside commode  with minimal assistance/contact guard assist and maintain NWB left LE within 7 day(s). 2.  Patient will perform all aspects of toileting with moderate assistance  within 7 day(s). 3.  Patient will participate in upper extremity therapeutic exercise/activities with supervision/set-up for 10 minutes within 7 day(s). 4.  Patient will stand for functional task > or = 1 minute with bilateral UE support and maintain NWB left LE within 7 day(s). Outcome: Not Met       OCCUPATIONAL THERAPY EVALUATION  Patient: Yg Trevino (89 y.o. female)  Date: 11/24/2021  Primary Diagnosis: Bimalleolar fracture of left ankle [S82.842A]  Procedure(s) (LRB):  LEFT ANKLE WASHOUT WITH EXTERNAL FIXATION (Left) 1 Day Post-Op   Precautions:   NWB left LE    ASSESSMENT  Based on the objective data described below, the patient presents with decreased ability to perform ADL's and transfers due to left LE with external fixator and NWB status. Demonstrated need for increased cues and instruction for stand pivot transfer technique and assist to maneuver rolling walker. Will continue to follow, noted plan for discharge to SNF and return for ORIF. Rolando Tineo Current Level of Function Impacting Discharge (ADLs/self-care): total assist LE ADL's, mod assist toileting, mod assist stand pivot transfer    Functional Outcome Measure:   The patient scored 50/100 on the Barthel Index outcome measure which is indicative of partially dependent. Other factors to consider for discharge: needs further sx     Patient will benefit from skilled therapy intervention to address the above noted impairments. PLAN :  Recommendations and Planned Interventions: self care training, functional mobility training, therapeutic exercise, balance training, therapeutic activities, endurance activities, patient education, home safety training, and family training/education    Frequency/Duration: Patient will be followed by occupational therapy 5 times a week to address goals. Recommendation for discharge: (in order for the patient to meet his/her long term goals)  Therapy up to 5 days/week in SNF setting    This discharge recommendation:  Has been made in collaboration with the attending provider and/or case management    IF patient discharges home will need the following DME: none       SUBJECTIVE:   Patient stated I always feel like I have to hold on with 2 hands.     OBJECTIVE DATA SUMMARY:   HISTORY:   Past Medical History:   Diagnosis Date    Arthritis     Gastrointestinal disorder     gerd    GERD (gastroesophageal reflux disease)     Ill-defined condition     cholesterol     Past Surgical History:   Procedure Laterality Date    COLONOSCOPY N/A 1/4/2019    Dr. Corlis Hatchet, no repeat recommended    HX BREAST BIOPSY  10-15 yrs ago    neg path, unsure which breast    HX COLONOSCOPY  01/01/2012    approx date, normal    HX ENDOSCOPY      no ulcers    HX ORTHOPAEDIC      right shoulder    HX WISDOM TEETH EXTRACTION         Expanded or extensive additional review of patient history:     Home Situation  Home Environment: Private residence  # Steps to Enter: 4  Rails to Enter: Yes  Hand Rails : Bilateral (wide)  One/Two Story Residence: Other (Comment) (split level)  Living Alone: No  Support Systems: Spouse/Significant Other ( has dementia)  Patient Expects to be Discharged to[de-identified] Skilled nursing facility  Current DME Used/Available at Home: [employer] [place], rolling, Shower chair, Grab bars  Tub or Shower Type: Shower    Hand dominance: Right    EXAMINATION OF PERFORMANCE DEFICITS:  Cognitive/Behavioral Status:  Neurologic State: Alert  Orientation Level: Oriented X4  Cognition: Appropriate decision making; Appropriate for age attention/concentration; Appropriate safety awareness; Follows commands  Perception: Appears intact  Perseveration: No perseveration noted  Safety/Judgement: Awareness of environment; Fall prevention; Home safety; Insight into deficits    Skin: left LE with external fixator    Edema: left LE    Hearing: Auditory  Auditory Impairment: None    Vision/Perceptual:                                Corrective Lenses: Reading glasses    Range of Motion:  AROM: Generally decreased, functional                         Strength:  Strength: Generally decreased, functional                Coordination:     Fine Motor Skills-Upper: Left Intact; Right Intact    Gross Motor Skills-Upper: Left Intact; Right Intact            Balance:  Sitting: Intact  Standing: Impaired  Standing - Static: Fair    Functional Mobility and Transfers for ADLs:  Bed Mobility:  Rolling: Contact guard assistance  Supine to Sit: Contact guard assistance  Scooting: Minimum assistance    Transfers:  Sit to Stand: Minimum assistance  Stand to Sit: Minimum assistance  Bed to Chair: Moderate assistance; Additional time; Adaptive equipment; Assist x1  Bathroom Mobility: Dependent/total assistance  Toilet Transfer : Moderate assistance; Additional time; Adaptive equipment; Assist x1 (pivot to commode)    ADL Assessment:  Feeding: Modified independent    Oral Facial Hygiene/Grooming: Setup         Upper Body Dressing: Setup    Lower Body Dressing: Total assistance; Maximum assistance    Toileting:  Moderate assistance                ADL Intervention and task modifications:     Educated on role of OT, instructed on use of rolling walker and positioning of hands, left LE for sit to stand and stand to sit transfers, instructed on technique to pivot to and from right to bedside commode and to bed    Patient instructed and indicated understanding the benefits of maintaining activity tolerance, functional mobility, and independence with self care tasks during acute stay  to ensure safe return home and to baseline. Encouraged patient to increase frequency and duration OOB, be out of bed for all meals, perform daily ADLs (as approved by RN/MD regarding bathing etc), and performing functional mobility to/from bedside commode          Cognitive Retraining  Safety/Judgement: Awareness of environment; Fall prevention; Home safety; Insight into deficits    Therapeutic Exercise: Instructed in right LE ankle pumps   Functional Measure:    Barthel Index:  Bathin  Bladder: 10  Bowels: 10  Groomin  Dressin  Feeding: 10  Mobility: 0  Stairs: 0  Toilet Use: 5  Transfer (Bed to Chair and Back): 5  Total: 50/100      The Barthel ADL Index: Guidelines  1. The index should be used as a record of what a patient does, not as a record of what a patient could do. 2. The main aim is to establish degree of independence from any help, physical or verbal, however minor and for whatever reason. 3. The need for supervision renders the patient not independent. 4. A patient's performance should be established using the best available evidence. Asking the patient, friends/relatives and nurses are the usual sources, but direct observation and common sense are also important. However direct testing is not needed. 5. Usually the patient's performance over the preceding 24-48 hours is important, but occasionally longer periods will be relevant. 6. Middle categories imply that the patient supplies over 50 per cent of the effort. 7. Use of aids to be independent is allowed.     Score Interpretation (from [address] [facility])    Independent   60-79 Minimally independent   40-59 Partially dependent   20-39 Very dependent   <20 Totally dependent     -Geovanna Stockton, Barthel, D.W. ([id number]). Functional evaluation: the Barthel Index. [address] ([address] [place] [address] (1997). The Barthel activities of daily living index: self-reporting versus actual performance in the old (> or = 75 years). Journal of [address] [place] [address] 45(7), [facility], HYUN, Roel Saldana., Monica Meeks. (1999). Measuring the change in disability after inpatient rehabilitation; comparison of the responsiveness of the Barthel Index and Functional Angelina Measure. Journal of Neurology, Neurosurgery, and Psychiatry, 66(4), 004-584. Kunal Guardado, [place].JENNA, JORGE Osullivan, & Carlene Gomez M.A. (2004) Assessment of post-stroke quality of life in cost-effectiveness studies: The usefulness of the Barthel Index and the EuroQoL-5D. Quality of Life Research, 15, 880-41         Occupational Therapy Evaluation Charge Determination   History Examination Decision-Making   LOW Complexity : Brief history review  MEDIUM Complexity : 3-5 performance deficits relating to physical, cognitive , or psychosocial skils that result in activity limitations and / or participation restrictions MEDIUM Complexity : Patient may present with comorbidities that affect occupational performnce. Miniml to moderate modification of tasks or assistance (eg, physical or verbal ) with assesment(s) is necessary to enable patient to complete evaluation       Based on the above components, the patient evaluation is determined to be of the following complexity level: LOW   Pain Rating:  No complaint    Activity Tolerance:   Fair    After treatment patient left in no apparent distress:    Supine in bed, Heels elevated for pressure relief, Call bell within reach, and Side rails x 3    COMMUNICATION/EDUCATION:   The patients plan of care was discussed with: Physical therapist and Registered nurse.      Home safety education was provided and the patient/caregiver indicated understanding. and Patient/family have participated as able in goal setting and plan of care. This patients plan of care is appropriate for delegation to [facility].     Thank you for this referral.  Gina Deluna, OTR/L  Time Calculation: 28 mins

## 2021-11-24 NOTE — PROGRESS NOTES
Hospitalist Progress Note    NAME: Dary Lucas   :  1935   MRN:  [id number]     Subjective:   Daily Progress Note: 2021 10:32 AM    \"I'm okay\"     Pt seen and examined. No complaints. Amenable to SNF though not thrilled she has to go but understands utility of such.      Pain controlled     Current Facility-Administered Medications   Medication Dose Route Frequency    sodium chloride (NS) flush 5-40 mL  5-40 mL IntraVENous Q8H    sodium chloride (NS) flush 5-40 mL  5-40 mL IntraVENous PRN    polyethylene glycol (MIRALAX) packet 17 g  17 g Oral DAILY PRN    senna (SENOKOT) tablet 8.6 mg  1 Tablet Oral DAILY PRN    promethazine (PHENERGAN) tablet 12.5 mg  12.5 mg Oral Q6H PRN    Or    ondansetron (ZOFRAN) injection 4 mg  4 mg IntraVENous Q6H PRN    morphine injection 2 mg  2 mg IntraVENous Q4H PRN    pantoprazole (PROTONIX) tablet 40 mg  40 mg Oral ACB    sodium chloride (NS) flush 5-40 mL  5-40 mL IntraVENous Q8H    sodium chloride (NS) flush 5-40 mL  5-40 mL IntraVENous PRN    naloxone (NARCAN) injection 0.4 mg  0.4 mg IntraVENous PRN    calcium-vitamin D (OS-OLGA LIDIA +D3) 500 mg-200 unit per tablet 1 Tablet  1 Tablet Oral TID WITH MEALS    senna-docusate (PERICOLACE) 8.6-50 mg per tablet 1 Tablet  1 Tablet Oral BID    polyethylene glycol (MIRALAX) packet 17 g  17 g Oral DAILY    [START ON 2021] bisacodyL (DULCOLAX) suppository 10 mg  10 mg Rectal DAILY PRN    enoxaparin (LOVENOX) injection 40 mg  40 mg SubCUTAneous DAILY    mirtazapine (REMERON) tablet 7.5 mg  7.5 mg Oral QHS    ceFAZolin (ANCEF) 2 g in sterile water (preservative free) 20 mL IV syringe  2 g IntraVENous Q8H    traMADoL (ULTRAM) tablet 50 mg  50 mg Oral Q6H PRN    oxyCODONE IR (ROXICODONE) tablet 5 mg  5 mg Oral Q4H PRN    acetaminophen (TYLENOL) tablet 650 mg  650 mg Oral Q6H    Or    acetaminophen (TYLENOL) suppository 650 mg  650 mg Rectal Q6H          Objective:     Visit Vitals  BP (!) 154/70 (BP 1 Location: Right arm, BP Patient Position: At rest)   Pulse 74   Temp 98.6 °F (37 °C)   Resp 18   Wt 70.3 kg (155 lb)   SpO2 95%   BMI 28.35 kg/m²      O2 Device: None (Room air)    Temp (24hrs), Av.2 °F (36.8 °C), Min:97.6 °F (36.4 °C), Max:98.6 °F (37 °C)        PHYSICAL EXAM:  Gen: NAD   HEENT: soft, NT   Cor: RRR. No MRG   Lung: CTA B/L. No WRR  Ab: soft NT ND   Skin: no rashes   LLE in external fixator      Data Review    Recent Results (from the past 24 hour(s))   GLUCOSE, POC    Collection Time: 21  9:03 PM   Result Value Ref Range    Glucose (POC) 125 (H) 65 - 117 mg/dL    Performed by Van Prieto (PCT)    METABOLIC PANEL, COMPREHENSIVE    Collection Time: 21  2:34 AM   Result Value Ref Range    Sodium 141 136 - 145 mmol/L    Potassium 4.3 3.5 - 5.1 mmol/L    Chloride 111 (H) 97 - 108 mmol/L    CO2 27 21 - 32 mmol/L    Anion gap 3 (L) 5 - 15 mmol/L    Glucose 110 (H) 65 - 100 mg/dL    BUN 14 6 - 20 MG/DL    Creatinine 0.93 0.55 - 1.02 MG/DL    BUN/Creatinine ratio 15 12 - 20      GFR est AA >60 >60 ml/min/1.73m2    GFR est non-AA 57 (L) >60 ml/min/1.73m2    Calcium 8.2 (L) 8.5 - 10.1 MG/DL    Bilirubin, total 0.4 0.2 - 1.0 MG/DL    ALT (SGPT) 20 12 - 78 U/L    AST (SGOT) 18 15 - 37 U/L    Alk.  phosphatase 60 45 - 117 U/L    Protein, total 5.4 (L) 6.4 - 8.2 g/dL    Albumin 2.7 (L) 3.5 - 5.0 g/dL    Globulin 2.7 2.0 - 4.0 g/dL    A-G Ratio 1.0 (L) 1.1 - 2.2     CBC WITH AUTOMATED DIFF    Collection Time: 21  2:34 AM   Result Value Ref Range    WBC 6.9 3.6 - 11.0 K/uL    RBC 3.56 (L) 3.80 - 5.20 M/uL    HGB 10.2 (L) 11.5 - 16.0 g/dL    HCT 32.3 (L) 35.0 - 47.0 %    MCV 90.7 80.0 - 99.0 FL    MCH 28.7 26.0 - 34.0 PG    MCHC 31.6 30.0 - 36.5 g/dL    RDW 13.3 11.5 - 14.5 %    PLATELET 443 344 - 721 K/uL    MPV 9.1 8.9 - 12.9 FL    NRBC 0.0 0  WBC    ABSOLUTE NRBC 0.00 0.00 - 0.01 K/uL    NEUTROPHILS 67 32 - 75 %    LYMPHOCYTES 20 12 - 49 %    MONOCYTES 10 5 - 13 % EOSINOPHILS 2 0 - 7 %    BASOPHILS 1 0 - 1 %    IMMATURE GRANULOCYTES 0 0.0 - 0.5 %    ABS. NEUTROPHILS 4.6 1.8 - 8.0 K/UL    ABS. LYMPHOCYTES 1.4 0.8 - 3.5 K/UL    ABS. MONOCYTES 0.7 0.0 - 1.0 K/UL    ABS. EOSINOPHILS 0.1 0.0 - 0.4 K/UL    ABS. BASOPHILS 0.0 0.0 - 0.1 K/UL    ABS. IMM. GRANS. 0.0 0.00 - 0.04 K/UL    DF AUTOMATED     GLUCOSE, POC    Collection Time: 11/24/21  6:37 AM   Result Value Ref Range    Glucose (POC) 106 65 - 117 mg/dL    Performed by Van Prieto (PCT)    GLUCOSE, POC    Collection Time: 11/24/21 11:47 AM   Result Value Ref Range    Glucose (POC) 88 65 - 117 mg/dL    Performed by Johann Cardona (CON)      No results found for this visit on 11/23/21. All Micro Results     Procedure Component Value Units Date/Time    COVID-19 RAPID TEST [[id number]] Collected: 11/23/21 0546    Order Status: Completed Specimen: Nasopharyngeal Updated: 11/23/21 0622     Specimen source Nasopharyngeal        COVID-19 rapid test Not detected        Comment: Rapid Abbott ID Now       Rapid NAAT:  The specimen is NEGATIVE for SARS-CoV-2, the novel coronavirus associated with COVID-19. Negative results should be treated as presumptive and, if inconsistent with clinical signs and symptoms or necessary for patient management, should be tested with an alternative molecular assay. Negative results do not preclude SARS-CoV-2 infection and should not be used as the sole basis for patient management decisions. This test has been authorized by the FDA under an Emergency Use Authorization (EUA) for use by authorized laboratories. Fact sheet for Healthcare Providers: ConventionUpdate.co.nz  Fact sheet for Patients: ConventionUpdate.co.nz       Methodology: Isothermal Nucleic Acid Amplification                Radiology reports and films for the last 24 hours have been reviewed. Assessment/Plan:   Bimalleolar # L ankle  from  mechanical fall.   S/p surgery 11/23/21  -ortho on board  -PT/OT   -SNF placement   -pending progress of reduction of inflammation; possible surgery ORIF on 12/2      GERD  -on PPI      BP elevated - no prior diagnosis of HTN   -monitor     Signed By: Heidi Kennedy MD     November 24, 2021       32 minutes  Lovenox for DVT prophylaxis   Dispo SNF

## 2021-11-24 NOTE — PROGRESS NOTES
11/24/2021  3:33 PM  Care Management Progress Note      ICD-10-CM ICD-9-CM    1. Type I or II open bimalleolar fracture of left ankle, initial encounter  S82.842B 824.5        RUR:  8%  Risk Level: [x]Low []Moderate []High  Value-based purchasing: [x] Yes [] No  Bundle patient: [] Yes [x] No   Specify:     Transition of care plan:  1. Discharge pending placement and auth. Ortho following. 2. SNF - Pt and pt's son provided SNF preferences as [place] [id number] [facility], [facility], and [facility]. CM received denials from all facilities, except [facility]. CM spoke with liaison with [facility] who reported they are OON with pt's insurance; however, the business office will run pt's insurance on Friday to check of OON benefits. Pt's son aware, and will review SNF listing for additional preferences. Pt's son will call and provide preferences as soon as possible. 3. Outpatient follow-up. 4. Transport need TBD.

## 2021-11-25 LAB — HGB BLD-MCNC: 10.6 G/DL (ref 11.5–16)

## 2021-11-25 PROCEDURE — 36415 COLL VENOUS BLD VENIPUNCTURE: CPT

## 2021-11-25 PROCEDURE — 65270000029 HC RM PRIVATE

## 2021-11-25 PROCEDURE — 74011250637 HC RX REV CODE- 250/637: Performed by: HOSPITALIST

## 2021-11-25 PROCEDURE — 74011250636 HC RX REV CODE- 250/636: Performed by: PHYSICIAN ASSISTANT

## 2021-11-25 PROCEDURE — 85018 HEMOGLOBIN: CPT

## 2021-11-25 PROCEDURE — 74011250636 HC RX REV CODE- 250/636: Performed by: ORTHOPAEDIC SURGERY

## 2021-11-25 PROCEDURE — 74011250637 HC RX REV CODE- 250/637: Performed by: ORTHOPAEDIC SURGERY

## 2021-11-25 PROCEDURE — 74011250637 HC RX REV CODE- 250/637: Performed by: INTERNAL MEDICINE

## 2021-11-25 PROCEDURE — 74011250637 HC RX REV CODE- 250/637: Performed by: PHYSICIAN ASSISTANT

## 2021-11-25 PROCEDURE — 74011000250 HC RX REV CODE- 250: Performed by: PHYSICIAN ASSISTANT

## 2021-11-25 RX ORDER — HYDRALAZINE HYDROCHLORIDE 20 MG/ML
10 INJECTION INTRAMUSCULAR; INTRAVENOUS
Status: DISCONTINUED | OUTPATIENT
Start: 2021-11-25 | End: 2021-12-03 | Stop reason: HOSPADM

## 2021-11-25 RX ORDER — POLYETHYLENE GLYCOL 3350 17 G/17G
17 POWDER, FOR SOLUTION ORAL 2 TIMES DAILY
Status: DISCONTINUED | OUTPATIENT
Start: 2021-11-25 | End: 2021-12-03 | Stop reason: HOSPADM

## 2021-11-25 RX ORDER — MAGNESIUM CITRATE
148 SOLUTION, ORAL ORAL
Status: COMPLETED | OUTPATIENT
Start: 2021-11-25 | End: 2021-11-25

## 2021-11-25 RX ADMIN — WATER 2 G: 1 INJECTION INTRAMUSCULAR; INTRAVENOUS; SUBCUTANEOUS at 05:46

## 2021-11-25 RX ADMIN — ACETAMINOPHEN 650 MG: 325 TABLET ORAL at 18:49

## 2021-11-25 RX ADMIN — Medication 1 TABLET: at 11:05

## 2021-11-25 RX ADMIN — MIRTAZAPINE 7.5 MG: 15 TABLET, FILM COATED ORAL at 21:46

## 2021-11-25 RX ADMIN — DOCUSATE SODIUM 50MG AND SENNOSIDES 8.6MG 1 TABLET: 8.6; 5 TABLET, FILM COATED ORAL at 08:10

## 2021-11-25 RX ADMIN — ACETAMINOPHEN 650 MG: 325 TABLET ORAL at 05:54

## 2021-11-25 RX ADMIN — DOCUSATE SODIUM 50MG AND SENNOSIDES 8.6MG 1 TABLET: 8.6; 5 TABLET, FILM COATED ORAL at 17:13

## 2021-11-25 RX ADMIN — ENOXAPARIN SODIUM 40 MG: 100 INJECTION SUBCUTANEOUS at 08:10

## 2021-11-25 RX ADMIN — MAGNESIUM CITRATE 148 ML: 1.75 LIQUID ORAL at 11:05

## 2021-11-25 RX ADMIN — Medication 10 ML: at 05:45

## 2021-11-25 RX ADMIN — Medication 10 ML: at 13:03

## 2021-11-25 RX ADMIN — Medication 1 TABLET: at 08:10

## 2021-11-25 RX ADMIN — PANTOPRAZOLE SODIUM 40 MG: 40 TABLET, DELAYED RELEASE ORAL at 05:54

## 2021-11-25 RX ADMIN — ACETAMINOPHEN 650 MG: 325 TABLET ORAL at 00:53

## 2021-11-25 RX ADMIN — Medication 10 ML: at 05:53

## 2021-11-25 RX ADMIN — POLYETHYLENE GLYCOL 3350 17 G: 17 POWDER, FOR SOLUTION ORAL at 08:10

## 2021-11-25 RX ADMIN — ACETAMINOPHEN 650 MG: 325 TABLET ORAL at 13:02

## 2021-11-25 RX ADMIN — Medication 1 TABLET: at 17:12

## 2021-11-25 NOTE — PROGRESS NOTES
Problem: Pain  Goal: *Control of Pain  Outcome: Progressing Towards Goal     Problem: Falls - Risk of  Goal: *Absence of Falls  Description: Document Celio Fall Risk and appropriate interventions in the flowsheet.   Outcome: Progressing Towards Goal  Note: Fall Risk Interventions:  Mobility Interventions: Patient to call before getting OOB    Mentation Interventions: Adequate sleep, hydration, pain control    Medication Interventions: Teach patient to arise slowly, Patient to call before getting OOB, Bed/chair exit alarm    Elimination Interventions: Call light in reach    History of Falls Interventions: Vital signs minimum Q4HRs X 24 hrs (comment for end date)

## 2021-11-25 NOTE — PROGRESS NOTES
Hospitalist Progress Note    NAME: Pam Cruz   :  1935   MRN:  [id number]     Subjective:   Daily Progress Note: 2021 10:32 AM    \"I'm okay\"     Pt seen and examined. Still awaiting SNF placement.  Has not had a BM but passing flatus     Current Facility-Administered Medications   Medication Dose Route Frequency    polyethylene glycol (MIRALAX) packet 17 g  17 g Oral BID    sodium chloride (NS) flush 5-40 mL  5-40 mL IntraVENous Q8H    sodium chloride (NS) flush 5-40 mL  5-40 mL IntraVENous PRN    polyethylene glycol (MIRALAX) packet 17 g  17 g Oral DAILY PRN    senna (SENOKOT) tablet 8.6 mg  1 Tablet Oral DAILY PRN    promethazine (PHENERGAN) tablet 12.5 mg  12.5 mg Oral Q6H PRN    Or    ondansetron (ZOFRAN) injection 4 mg  4 mg IntraVENous Q6H PRN    morphine injection 2 mg  2 mg IntraVENous Q4H PRN    pantoprazole (PROTONIX) tablet 40 mg  40 mg Oral ACB    sodium chloride (NS) flush 5-40 mL  5-40 mL IntraVENous Q8H    sodium chloride (NS) flush 5-40 mL  5-40 mL IntraVENous PRN    naloxone (NARCAN) injection 0.4 mg  0.4 mg IntraVENous PRN    calcium-vitamin D (OS-OLGA LIDIA +D3) 500 mg-200 unit per tablet 1 Tablet  1 Tablet Oral TID WITH MEALS    senna-docusate (PERICOLACE) 8.6-50 mg per tablet 1 Tablet  1 Tablet Oral BID    bisacodyL (DULCOLAX) suppository 10 mg  10 mg Rectal DAILY PRN    enoxaparin (LOVENOX) injection 40 mg  40 mg SubCUTAneous DAILY    mirtazapine (REMERON) tablet 7.5 mg  7.5 mg Oral QHS    traMADoL (ULTRAM) tablet 50 mg  50 mg Oral Q6H PRN    oxyCODONE IR (ROXICODONE) tablet 5 mg  5 mg Oral Q4H PRN    acetaminophen (TYLENOL) tablet 650 mg  650 mg Oral Q6H    Or    acetaminophen (TYLENOL) suppository 650 mg  650 mg Rectal Q6H          Objective:     Visit Vitals  /70 (BP 1 Location: Left upper arm, BP Patient Position: At rest)   Pulse 87   Temp 98.9 °F (37.2 °C)   Resp 18   Wt 70.3 kg (155 lb)   SpO2 95%   BMI 28.35 kg/m²      O2 Device: None (Room air)    Temp (24hrs), Av.2 °F (36.8 °C), Min:97.7 °F (36.5 °C), Max:98.9 °F (37.2 °C)        PHYSICAL EXAM:  Gen: NAD   HEENT: soft, NT   Cor: RRR. No MRG   Lung: CTA B/L. No WRR  Ab: soft NT ND   Skin: no rashes   LLE in external fixator      Data Review    Recent Results (from the past 24 hour(s))   GLUCOSE, POC    Collection Time: 21  4:28 PM   Result Value Ref Range    Glucose (POC) 99 65 - 117 mg/dL    Performed by Johann Cardona (CON)    HEMOGLOBIN    Collection Time: 21  1:00 AM   Result Value Ref Range    HGB 10.6 (L) 11.5 - 16.0 g/dL     No results found for this visit on 21. All Micro Results     Procedure Component Value Units Date/Time    COVID-19 RAPID TEST [[id number]] Collected: 21    Order Status: Completed Specimen: Nasopharyngeal Updated: 21     Specimen source Nasopharyngeal        COVID-19 rapid test Not detected        Comment: Rapid Abbott ID Now       Rapid NAAT:  The specimen is NEGATIVE for SARS-CoV-2, the novel coronavirus associated with COVID-19. Negative results should be treated as presumptive and, if inconsistent with clinical signs and symptoms or necessary for patient management, should be tested with an alternative molecular assay. Negative results do not preclude SARS-CoV-2 infection and should not be used as the sole basis for patient management decisions. This test has been authorized by the FDA under an Emergency Use Authorization (EUA) for use by authorized laboratories. Fact sheet for Healthcare Providers: kstattoo.com  Fact sheet for Patients: GraffitiGeo.com       Methodology: Isothermal Nucleic Acid Amplification                Radiology reports and films for the last 24 hours have been reviewed. Assessment/Plan:   Bimalleolar # L ankle from mechanical fall.   S/p surgery 21  -ortho on board  -PT/OT   -SNF placement pending   -pending progress of reduction of inflammation; possible surgery ORIF on 12/2   -pt without active medical problems; will ask ortho to assume as attendings      GERD  -on PPI      BP transiently elevated - no prior diagnosis of HTN. Likely 2/2 pain   -monitor.  Not requiring PRN's     Constipation   -senna/docusate/Miralax  -add Mg Citrate     Signed By: Alona Sosa MD     November 25, 2021       16 minutes  Lovenox for DVT prophylaxis   Dispo SNF

## 2021-11-25 NOTE — PROGRESS NOTES
Orthopaedic Progress Note  Post Op day: 1 Day Post-Op    November 24, 2021 8:56 PM     Patient: José Miguel Berger MRN: [id number]  SSN: [id number]    YOB: 1935  Age: 80 y.o. Sex: female      Admit date:  11/23/2021  Date of Surgery:  11/23/2021   Procedures:  Procedure(s):  LEFT ANKLE WASHOUT WITH EXTERNAL FIXATION  Admitting Physician:  Vi Josue MD   Surgeon:  Kunal Gerber) and Role:     * Ruel Man MD - Primary    Consulting Physician(s): Treatment Team: Attending Provider: Flor Tong MD; Consulting Provider: Ruel Man MD; Consulting Provider: DEANNA Calabrese; Care Manager: Jamaica White; Utilization Review: Shane Owens RN; Staff Nurse: Michelle Marley RN; Tech[facility]    SUBJECTIVE:     José Miguel Berger is a 80 y.o. female is 1 Day Post-Op s/p Procedure(s):  LEFT ANKLE WASHOUT WITH EXTERNAL FIXATION with an appropriate level of post-operative pain. She had some bloody drainage yesterday and was reinforced by RIO Fajardo. No complaints of nausea, vomiting, dizziness, lightheadedness, chest pain, or shortness of breath. OBJECTIVE:       Physical Exam:  General: Alert, cooperative, no distress. Respiratory: Respirations unlabored  Neurological:  Neurovascular exam within normal limits. Motor: + DF/PF. Musculoskeletal: Calves soft, supple, non-tender upon palpation. Dressing/Wound:  Ex fix in place. There is bloody drainage on the pillow. Dressings removed. The anterior medial laceration without perinicisional blistering. No wound dehiscence. There is bloody drainage from the central laceration. The pin sites are all clean without drainage. No erythema. Moderate foot edema. +BCR of all digits. + DP pulse. SILT all digits of foot. SILT of lateral and medial foot.               Vital Signs:      Patient Vitals for the past 8 hrs:   BP Temp Pulse Resp SpO2   11/24/21 1932 (!) 117/57 98 °F (36.7 °C) 93 18 91 %   21 1620 113/60 -- 76 18 91 %                                          Temp (24hrs), Av.1 °F (36.7 °C), Min:97.6 °F (36.4 °C), Max:98.6 °F (37 °C)      Labs:        Recent Labs     21  0234   HCT 32.3*   HGB 10.2*     Lab Results   Component Value Date/Time    Sodium 141 2021 02:34 AM    Potassium 4.3 2021 02:34 AM    Chloride 111 (H) 2021 02:34 AM    CO2 27 2021 02:34 AM    Glucose 110 (H) 2021 02:34 AM    BUN 14 2021 02:34 AM    Creatinine 0.93 2021 02:34 AM    Calcium 8.2 (L) 2021 02:34 AM       PT/OT:                Patient mobility  Bed Mobility Training  Rolling: Contact guard assistance  Supine to Sit: Contact guard assistance  Scooting: Minimum assistance  Transfer Training  Sit to Stand: Minimum assistance  Stand to Sit: Minimum assistance  Bed to Chair: Moderate assistance, Additional time, Adaptive equipment, Assist x1          Weight Bearing Status  Left Side Weight Bearing: Non-weight bearing        ASSESSMENT / PLAN:   Principal Problem:    Bimalleolar fracture of left ankle (2021)    Active Problems:    Pure hypercholesterolemia (2016)      Gastroesophageal reflux disease without esophagitis (2016)      Chronic bilateral low back pain without sciatica (2016)      Subclinical hypothyroidism (2018)          A: 1. S/P I and D left ankle for Type 2 open trimalleolar ankle fracture   2. S/P closed reduction and external fixation of type 2 open left trimalleolar ankle fracture    P: 1. PT/OT: NWB LLE. Elevation in all positions. 2. DVT ppx: Lovenox 40 mg SC daily. 3. Antibiotics: complete 48 hours of Ancef for open fracture. Will not need to be DC on PO abx. 4. Plan for followup with Dr. Toya Denton next Wednesday. Appt has been made. Plan for ORIF once improvement in skin/edema. 5. DISPO: SNF  6. Analgesics: Tylenol.       Signed By:  Zeinab Resendez, [address] [place] [address] [facility]

## 2021-11-25 NOTE — PROGRESS NOTES
Orthopedic NP Progress Note  Post Op day: 2 Days Post-Op    November 25, 2021 10:48 AM     Etienne More    Attending Physician: Treatment Team: Attending Provider: Mendy Grace MD; Consulting Provider: Geo Morgan MD; Consulting Provider: DEANNA Price; Care Manager: Tristan Méndez; Utilization Review: Roshan Hernandez RN     Vital Signs:    Patient Vitals for the past 8 hrs:   BP Temp Pulse Resp SpO2   11/25/21 0752 115/64 97.9 °F (36.6 °C) 84 18 95 %   11/25/21 0412 114/68 97.7 °F (36.5 °C) 80 18 90 %          Intake/Output:  No intake/output data recorded. 11/23 1901 - 11/25 0700  In: -   Out: 700 [Urine:700]    Pain Control:   Pain Assessment  Pain Scale 1: Numeric (0 - 10)  Pain Intensity 1: 4  Pain Onset 1: ankle  Pain Location 1: Leg  Pain Orientation 1: Left  Pain Description 1: Aching  Pain Intervention(s) 1: Medication (see MAR)    LAB:    Recent Labs     11/25/21  0100 11/24/21  0234 11/24/21  0234   HCT  --   --  32.3*   HGB 10.6*   < > 10.2*    < > = values in this interval not displayed. Lab Results   Component Value Date/Time    Sodium 141 11/24/2021 02:34 AM    Potassium 4.3 11/24/2021 02:34 AM    Chloride 111 (H) 11/24/2021 02:34 AM    CO2 27 11/24/2021 02:34 AM    Glucose 110 (H) 11/24/2021 02:34 AM    BUN 14 11/24/2021 02:34 AM    Creatinine 0.93 11/24/2021 02:34 AM    Calcium 8.2 (L) 11/24/2021 02:34 AM       Subjective:  Etienne More is a 80 y.o. female s/p a  Procedure(s):  LEFT ANKLE WASHOUT WITH EXTERNAL FIXATION   Procedure(s):  LEFT ANKLE WASHOUT WITH EXTERNAL FIXATION. Tolerating diet. Resting in bed with LLE elevated - no pain. Has been up to [facility], does report no BM since admission        Objective: General: alert, cooperative, no distress. Gastrointestinal:  Soft, non-tender. Neuro/Vascular: CNS Intact. Sensation stable. Brisk cap refill, + pulses UE/LE  Musculoskeletal:  +ROM UE/LE with ex fix to ankle, +ROM of toes on L, NVI . Skin: Incision - clean, dry and intact. No significant erythema or swelling.     Dressing:+ bloody drainage     [place] - n  Drain - n       PT/OT:   Gait:                      Assessment:    s/p Procedure(s):  LEFT ANKLE WASHOUT WITH EXTERNAL FIXATION    Principal Problem:    Bimalleolar fracture of left ankle (11/23/2021)    Active Problems:    Pure hypercholesterolemia (12/9/2016)      Gastroesophageal reflux disease without esophagitis (12/9/2016)      Chronic bilateral low back pain without sciatica (12/9/2016)      Subclinical hypothyroidism (6/21/2018)         Plan:   -  Continue PT/OT - NWB LLE, elevation at all times   -  Continue established methods of pain control  -  VTE Prophylaxes - TEDS &/or SCDs with Lovenox 40mg daily   -  IV ancef for 48 hours completed  -  Pin care and dressing change completed by me during rounds, will follow up in AM      Discharge To:  SNF, awaiting auth     Signed By: Sky Ibarra NP    Orthopedic Nurse Practitioner

## 2021-11-26 PROCEDURE — 74011250637 HC RX REV CODE- 250/637: Performed by: ORTHOPAEDIC SURGERY

## 2021-11-26 PROCEDURE — 97535 SELF CARE MNGMENT TRAINING: CPT

## 2021-11-26 PROCEDURE — 97110 THERAPEUTIC EXERCISES: CPT

## 2021-11-26 PROCEDURE — 74011250637 HC RX REV CODE- 250/637: Performed by: PHYSICIAN ASSISTANT

## 2021-11-26 PROCEDURE — 74011250637 HC RX REV CODE- 250/637: Performed by: INTERNAL MEDICINE

## 2021-11-26 PROCEDURE — 74011250636 HC RX REV CODE- 250/636: Performed by: ORTHOPAEDIC SURGERY

## 2021-11-26 PROCEDURE — 74011250637 HC RX REV CODE- 250/637: Performed by: HOSPITALIST

## 2021-11-26 PROCEDURE — 65270000029 HC RM PRIVATE

## 2021-11-26 PROCEDURE — 97530 THERAPEUTIC ACTIVITIES: CPT

## 2021-11-26 PROCEDURE — 2709999900 HC NON-CHARGEABLE SUPPLY

## 2021-11-26 RX ADMIN — ENOXAPARIN SODIUM 40 MG: 100 INJECTION SUBCUTANEOUS at 08:52

## 2021-11-26 RX ADMIN — ACETAMINOPHEN 650 MG: 325 TABLET ORAL at 11:44

## 2021-11-26 RX ADMIN — Medication 10 ML: at 06:49

## 2021-11-26 RX ADMIN — Medication 1 TABLET: at 08:53

## 2021-11-26 RX ADMIN — POLYETHYLENE GLYCOL 3350 17 G: 17 POWDER, FOR SOLUTION ORAL at 17:22

## 2021-11-26 RX ADMIN — DOCUSATE SODIUM 50MG AND SENNOSIDES 8.6MG 1 TABLET: 8.6; 5 TABLET, FILM COATED ORAL at 08:52

## 2021-11-26 RX ADMIN — ACETAMINOPHEN 650 MG: 325 TABLET ORAL at 17:23

## 2021-11-26 RX ADMIN — Medication 10 ML: at 01:10

## 2021-11-26 RX ADMIN — ACETAMINOPHEN 650 MG: 325 TABLET ORAL at 01:10

## 2021-11-26 RX ADMIN — Medication 10 ML: at 11:45

## 2021-11-26 RX ADMIN — MIRTAZAPINE 7.5 MG: 15 TABLET, FILM COATED ORAL at 22:34

## 2021-11-26 RX ADMIN — DOCUSATE SODIUM 50MG AND SENNOSIDES 8.6MG 1 TABLET: 8.6; 5 TABLET, FILM COATED ORAL at 17:22

## 2021-11-26 RX ADMIN — POLYETHYLENE GLYCOL 3350 17 G: 17 POWDER, FOR SOLUTION ORAL at 08:53

## 2021-11-26 RX ADMIN — Medication 1 TABLET: at 17:22

## 2021-11-26 RX ADMIN — PANTOPRAZOLE SODIUM 40 MG: 40 TABLET, DELAYED RELEASE ORAL at 06:43

## 2021-11-26 RX ADMIN — Medication 1 TABLET: at 11:44

## 2021-11-26 RX ADMIN — Medication 10 ML: at 22:38

## 2021-11-26 RX ADMIN — ACETAMINOPHEN 650 MG: 325 TABLET ORAL at 06:43

## 2021-11-26 NOTE — PROGRESS NOTES
Problem: Self Care Deficits Care Plan (Adult)  Goal: *Acute Goals and Plan of Care (Insert Text)  Description:   FUNCTIONAL STATUS PRIOR TO ADMISSION: Patient was independent and active without use of DME. 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 Occupational Therapy Goals  Initiated 11/24/2021  1. Patient will perform stand pivot transfer to and from bedside commode  with minimal assistance/contact guard assist and maintain NWB left LE within 7 day(s). 2.  Patient will perform all aspects of toileting with moderate assistance  within 7 day(s). 3.  Patient will participate in upper extremity therapeutic exercise/activities with supervision/set-up for 10 minutes within 7 day(s). 4.  Patient will stand for functional task > or = 1 minute with bilateral UE support and maintain NWB left LE within 7 day(s). Outcome: Progressing Towards Goal   OCCUPATIONAL THERAPY TREATMENT  Patient: Nirmala Espinal (75 y.o. female)  Date: 11/26/2021  Diagnosis: Bimalleolar fracture of left ankle [S82.842A]   Bimalleolar fracture of left ankle  Procedure(s) (LRB):  LEFT ANKLE WASHOUT WITH EXTERNAL FIXATION (Left) 3 Days Post-Op  Precautions: NWB  Chart, occupational therapy assessment, plan of care, and goals were reviewed. ASSESSMENT  Patient continues with skilled OT services and is progressing towards goals. Pt used bath cloths for UB and for LE's with set-up. She doffed/ donned gown. Pt needs moderate assist for transfer to [facility] hopping and cues for safety. Pt able to doff/don R sock cross legged method. Pt engaged with UE exercises and instructed to perform 3 x a day as able. Current Level of Function Impacting Discharge (ADLs):  Moderate assist to transfer to [facility], able to doff/don sock    Other factors to consider for discharge: PLAN :  Patient continues to benefit from skilled intervention to address the above impairments. Continue treatment per established plan of care to address goals. Recommend with staff: out of bed for ADL's, there ex, there act, meals    Recommend next OT session: cont towards goals    Recommendation for discharge: (in order for the patient to meet his/her long term goals)  Therapy up to 5 days/week in SNF setting    This discharge recommendation:  Has not yet been discussed the attending provider and/or case management    IF patient discharges home will need the following DME:        SUBJECTIVE:   Patient stated People tell me I am in good shape.     OBJECTIVE DATA SUMMARY:   Cognitive/Behavioral Status:  Neurologic State: Alert  Orientation Level: Oriented X4  Cognition: Follows commands             Functional Mobility and Transfers for ADLs:  Bed Mobility:  Rolling: Contact guard assistance  Supine to Sit: Contact guard assistance  Scooting: Minimum assistance    Transfers:  Sit to Stand: Minimum assistance; Assist x1     Bed to Chair: Assist x2; Minimum assistance    Balance:  Sitting: Intact  Standing: Impaired;  With support  Standing - Static: Fair    ADL Intervention:            Upper Body Bathing  Bathing Assistance: Stand-by assistance  Position Performed: Seated in chair    Lower Body Bathing  Lower Body : Stand-by assistance  Position Performed: Seated in chair    [address]: Stand-by assistance  [facility] Gown: Stand-by assistance    Lower Body Dressing Assistance  Socks: Modified independent (right sock)  Leg Crossed Method Used: Yes  Position Performed: Seated in chair              Therapeutic Exercises:     EXERCISE   Sets   Reps   Active Active Assist   Passive   Comments   Shoulder shrugs 2 10 [x]           []           []              Shoulder flex/ext 2 10 [x]           []           []              Chest presses 2 10 [x]           []           [] Shoulder ab/add 2 10 [x]           []           []              Forearm supination/pronation 2 10 [x]           []           []                 []           []           []                 []           []           []                 []           []           []                 []           []           []                 []           []           []                 []           []           []                   Activity Tolerance:   Good    After treatment patient left in no apparent distress:   Sitting in chair    COMMUNICATION/COLLABORATION:   The patients plan of care was discussed with: Occupational therapist and Registered nurse.      BELLA Cho/L  Time Calculation: 25 mins

## 2021-11-26 NOTE — PROGRESS NOTES
Son plans to call me back to give me options but is in a meeting currentkly so will call me back.     Vane Durbin

## 2021-11-26 NOTE — PROGRESS NOTES
Orthopedic NP Progress Note  Post Op day: 3 Days Post-Op    November 26, 2021 10:48 AM     Philippe Thomas    Attending Physician: Treatment Team: Attending Provider: Josefa Falcon MD; Consulting Provider: Mariano Morales MD; Consulting Provider: DEANNA Nguyen; Care Manager: Zachary Lomeli; Utilization Review: Prashant Kwong RN     Vital Signs:    Patient Vitals for the past 8 hrs:   BP Temp Pulse Resp SpO2   11/26/21 0817 136/77 98.7 °F (37.1 °C) 73 16 94 %          Intake/Output:  11/26 0701 - 11/26 1900  In: 240 [P.O.:240]  Out: 170 [NHBJD:462]  11/24 1901 - 11/26 0700  In: 500 [P.O.:500]  Out: 700 [Urine:700]    Pain Control:   Pain Assessment  Pain Scale 1: Numeric (0 - 10)  Pain Intensity 1: 3  Pain Onset 1: post op  Pain Location 1: Ankle  Pain Orientation 1: Left  Pain Description 1: Aching  Pain Intervention(s) 1: Relaxation technique, Repositioned    LAB:    Recent Labs     11/25/21  0100 11/24/21  0234 11/24/21  0234   HCT  --   --  32.3*   HGB 10.6*   < > 10.2*    < > = values in this interval not displayed. Lab Results   Component Value Date/Time    Sodium 141 11/24/2021 02:34 AM    Potassium 4.3 11/24/2021 02:34 AM    Chloride 111 (H) 11/24/2021 02:34 AM    CO2 27 11/24/2021 02:34 AM    Glucose 110 (H) 11/24/2021 02:34 AM    BUN 14 11/24/2021 02:34 AM    Creatinine 0.93 11/24/2021 02:34 AM    Calcium 8.2 (L) 11/24/2021 02:34 AM       Subjective:  Philippe Thomas is a 80 y.o. female s/p a  Procedure(s):  LEFT ANKLE WASHOUT WITH EXTERNAL FIXATION   Procedure(s):  LEFT ANKLE WASHOUT WITH EXTERNAL FIXATION. Tolerating diet. Resting in bed with LLE elevated - no pain. Has been up to [facility], up in chair and pain is well managed        Objective: General: alert, cooperative, no distress. Gastrointestinal:  Soft, non-tender. Neuro/Vascular: CNS Intact. Sensation stable.  Brisk cap refill, + pulses UE/LE  Musculoskeletal:  +ROM UE/LE with ex fix to ankle, +ROM of toes on L, NVI . Skin: Incision - clean, dry and intact. No significant erythema or swelling.     Dressing:+ bloody drainage     [place] - n  Drain - n       PT/OT:   Gait:                      Assessment:    s/p Procedure(s):  LEFT ANKLE WASHOUT WITH EXTERNAL FIXATION    Principal Problem:    Bimalleolar fracture of left ankle (11/23/2021)    Active Problems:    Pure hypercholesterolemia (12/9/2016)      Gastroesophageal reflux disease without esophagitis (12/9/2016)      Chronic bilateral low back pain without sciatica (12/9/2016)      Subclinical hypothyroidism (6/21/2018)         Plan:   -  Continue PT/OT - NWB LLE, elevation at all times   -  Continue established methods of pain control  -  VTE Prophylaxes - TEDS &/or SCDs with Lovenox 40mg daily   -  IV ancef for 48 hours completed  -  Pin care and dressing change completed by me during rounds, will follow up in AM      Discharge To:  SNF, awaiting auth     Signed By: Tyler Bell NP    Orthopedic Nurse Practitioner

## 2021-11-26 NOTE — PROGRESS NOTES
CARE MANAGEMENT   DAILY PROGRESS NOTE        NAME:   Remi Aguillon   :     1935   MRN:     [id number]     RUR:  8%  Risk Level:  Low  Value-based purchasing:  Yes   Bundle patient:  No    Transition of care plan:  1. Referral sent to [facility], [facility]. Alexandre was supposed to verify out of network benefits today. 2. CM was asked to follow up on SNF referral. CM left a message for [place] at [facility], [facility] ([phone]) and Jannetta Claude ([phone]). CM waiting on return call. CM received a message from Pt's son, Awilda Suárez, asking for an update and wanting to discuss alternate placements. CM returned call but Awilda Suárez did not answer. CM left a message. Waiting on return call. 3. Outpatient follow up.   4. Transportation: TBD      _____________________________________  AGUIAL Valladares - Care Management  2021   11:36 AM

## 2021-11-26 NOTE — PROGRESS NOTES
Problem: Pain  Goal: *Control of Pain  Outcome: Progressing Towards Goal  Goal: *PALLIATIVE CARE:  Alleviation of Pain  Outcome: Progressing Towards Goal     Problem: Falls - Risk of  Goal: *Absence of Falls  Description: Document Celio Fall Risk and appropriate interventions in the flowsheet. Outcome: Progressing Towards Goal  Note: Fall Risk Interventions:  Mobility Interventions: Bed/chair exit alarm, Strengthening exercises (ROM-active/passive), Utilize walker, cane, or other assistive device    Mentation Interventions: Bed/chair exit alarm, Adequate sleep, hydration, pain control, More frequent rounding, Increase mobility, Toileting rounds    Medication Interventions: Bed/chair exit alarm, Patient to call before getting OOB, Teach patient to arise slowly    Elimination Interventions: Bed/chair exit alarm, Call light in reach, Toilet paper/wipes in reach, Toileting schedule/hourly rounds    History of Falls Interventions: Bed/chair exit alarm         Problem: Pressure Injury - Risk of  Goal: *Prevention of pressure injury  Description: Document Eligio Scale and appropriate interventions in the flowsheet.   Outcome: Progressing Towards Goal  Note: Pressure Injury Interventions:  Sensory Interventions: Float heels, Minimize linen layers         Activity Interventions: Increase time out of bed, PT/OT evaluation    Mobility Interventions: Float heels, HOB 30 degrees or less, PT/OT evaluation    Nutrition Interventions: Document food/fluid/supplement intake

## 2021-11-26 NOTE — PROGRESS NOTES
I contacted [facility] to check on bed placement. I left a message with Adeline Viveros in admissions. Son,Musa is supposed to call back with alternative placement options.     Fredrick Booth

## 2021-11-26 NOTE — PROGRESS NOTES
[facility] called to tell me they cannot accept pt @ this time  Son gave me other options:  [facility]  These referrals have been sent through the cc link. I updated pt.'s son.     Gloria Phipps

## 2021-11-26 NOTE — PROGRESS NOTES
Problem: Pain  Goal: *Control of Pain  Outcome: Progressing Towards Goal     Problem: Falls - Risk of  Goal: *Absence of Falls  Description: Document Celio Fall Risk and appropriate interventions in the flowsheet.   Outcome: Progressing Towards Goal  Note: Fall Risk Interventions:  Mobility Interventions: Bed/chair exit alarm, Communicate number of staff needed for ambulation/transfer, Patient to call before getting OOB, PT Consult for mobility concerns, PT Consult for assist device competence, Strengthening exercises (ROM-active/passive), Utilize walker, cane, or other assistive device    Mentation Interventions: Adequate sleep, hydration, pain control, More frequent rounding    Medication Interventions: Assess postural VS orthostatic hypotension, Bed/chair exit alarm, Patient to call before getting OOB, Teach patient to arise slowly, Utilize gait belt for transfers/ambulation    Elimination Interventions: Bed/chair exit alarm, Call light in reach, Patient to call for help with toileting needs, Toileting schedule/hourly rounds    History of Falls Interventions: Bed/chair exit alarm

## 2021-11-26 NOTE — PROGRESS NOTES
Hospitalist Progress Note    NAME: Etienne More   :  1935   MRN:  [id number]     Subjective:   Daily Progress Note: 2021 10:32 AM    \"I'm okay\"     Pt seen and examined. Still awaiting SNF placement. Did have a BM on .  Feels improved     Current Facility-Administered Medications   Medication Dose Route Frequency    polyethylene glycol (MIRALAX) packet 17 g  17 g Oral BID    hydrALAZINE (APRESOLINE) 20 mg/mL injection 10 mg  10 mg IntraVENous Q6H PRN    sodium chloride (NS) flush 5-40 mL  5-40 mL IntraVENous Q8H    sodium chloride (NS) flush 5-40 mL  5-40 mL IntraVENous PRN    polyethylene glycol (MIRALAX) packet 17 g  17 g Oral DAILY PRN    senna (SENOKOT) tablet 8.6 mg  1 Tablet Oral DAILY PRN    promethazine (PHENERGAN) tablet 12.5 mg  12.5 mg Oral Q6H PRN    Or    ondansetron (ZOFRAN) injection 4 mg  4 mg IntraVENous Q6H PRN    morphine injection 2 mg  2 mg IntraVENous Q4H PRN    pantoprazole (PROTONIX) tablet 40 mg  40 mg Oral ACB    sodium chloride (NS) flush 5-40 mL  5-40 mL IntraVENous Q8H    sodium chloride (NS) flush 5-40 mL  5-40 mL IntraVENous PRN    naloxone (NARCAN) injection 0.4 mg  0.4 mg IntraVENous PRN    calcium-vitamin D (OS-OLGA LIDIA +D3) 500 mg-200 unit per tablet 1 Tablet  1 Tablet Oral TID WITH MEALS    senna-docusate (PERICOLACE) 8.6-50 mg per tablet 1 Tablet  1 Tablet Oral BID    bisacodyL (DULCOLAX) suppository 10 mg  10 mg Rectal DAILY PRN    enoxaparin (LOVENOX) injection 40 mg  40 mg SubCUTAneous DAILY    mirtazapine (REMERON) tablet 7.5 mg  7.5 mg Oral QHS    traMADoL (ULTRAM) tablet 50 mg  50 mg Oral Q6H PRN    oxyCODONE IR (ROXICODONE) tablet 5 mg  5 mg Oral Q4H PRN    acetaminophen (TYLENOL) tablet 650 mg  650 mg Oral Q6H    Or    acetaminophen (TYLENOL) suppository 650 mg  650 mg Rectal Q6H          Objective:     Visit Vitals  /77 (BP 1 Location: Left arm, BP Patient Position: At rest)   Pulse 73   Temp 98.7 °F (37.1 °C) Resp 16   Wt 70.3 kg (155 lb)   SpO2 94%   BMI 28.35 kg/m²      O2 Device: None (Room air)    Temp (24hrs), Av.5 °F (36.9 °C), Min:98.1 °F (36.7 °C), Max:98.9 °F (37.2 °C)        PHYSICAL EXAM:  Gen: NAD   HEENT: soft, NT   Cor: RRR. No MRG   Lung: CTA B/L. No WRR  Ab: soft NT ND   Skin: no rashes   LLE in external fixator  Dressing with mild blood (later changed by ortho PA)       Data Review    No results found for this or any previous visit (from the past 24 hour(s)). No results found for this visit on 21. All Micro Results     Procedure Component Value Units Date/Time    COVID-19 RAPID TEST [[id number]] Collected: 21 0546    Order Status: Completed Specimen: Nasopharyngeal Updated: 21     Specimen source Nasopharyngeal        COVID-19 rapid test Not detected        Comment: Rapid Abbott ID Now       Rapid NAAT:  The specimen is NEGATIVE for SARS-CoV-2, the novel coronavirus associated with COVID-19. Negative results should be treated as presumptive and, if inconsistent with clinical signs and symptoms or necessary for patient management, should be tested with an alternative molecular assay. Negative results do not preclude SARS-CoV-2 infection and should not be used as the sole basis for patient management decisions. This test has been authorized by the FDA under an Emergency Use Authorization (EUA) for use by authorized laboratories. Fact sheet for Healthcare Providers: ConventionUpdate.co.nz  Fact sheet for Patients: ConventionUpdate.co.nz       Methodology: Isothermal Nucleic Acid Amplification                Radiology reports and films for the last 24 hours have been reviewed. Assessment/Plan:   Bimalleolar # L ankle from mechanical fall.   S/p surgery 21  -ortho on board  -PT/OT   -SNF placement pending   -pending progress of reduction of inflammation; possible surgery ORIF on ; possible that pt will be in house until that time considering delays with placement. Will d/w ortho      GERD  -on PPI      BP transiently elevated - no prior diagnosis of HTN. Likely 2/2 pain   -monitor.  Not requiring PRN's     Constipation - resolved     Signed By: Shawn Tamayo MD     November 26, 2021       20 minutes  Lovenox for DVT prophylaxis   Dispo SNF

## 2021-11-26 NOTE — PROGRESS NOTES
Problem: Mobility Impaired (Adult and Pediatric)  Goal: *Acute Goals and Plan of Care (Insert Text)  Description: FUNCTIONAL STATUS PRIOR TO ADMISSION: Patient was independent and active without use of DME.    HOME SUPPORT PRIOR TO ADMISSION: The patient lived with  but did not require assist. Pt's  has dementia and pt is the caregiver. Physical Therapy Goals  Initiated 11/24/2021  1. Patient will move from supine to sit and sit to supine , scoot up and down, and roll side to side in bed with modified independence within 7 day(s). 2.  Patient will transfer from bed to chair and chair to bed with supervision/set-up using the least restrictive device within 7 day(s). 3.  Patient will perform sit to stand with supervision/set-up within 7 day(s). 4.  Patient will ambulate with moderate assist for 5 feet with the least restrictive device within 7 day(s). Outcome: Progressing Towards Goal   PHYSICAL THERAPY TREATMENT  Patient: Ivan Foss (89 y.o. female)  Date: 11/26/2021  Diagnosis: Bimalleolar fracture of left ankle [S82.842A]   Bimalleolar fracture of left ankle  Procedure(s) (LRB):  LEFT ANKLE WASHOUT WITH EXTERNAL FIXATION (Left) 3 Days Post-Op  Precautions: NWB; elevate L LE  Chart, physical therapy assessment, plan of care and goals were reviewed. ASSESSMENT  Patient continues with skilled PT services and is progressing towards goals. Patient agrees to PT this morning with request for hygiene items after activity. Patient able to perform bed mobility CGA; transfers min A x 1-2 person assistance; gait: able to take small hops min A x 2 with RW 2-3 steps to get from bed to recliner chair. Patient compliant with NWB status L LE. Patient  was assisted with LE to be in elevated position with pillow prop once in recliner chair. Set up with tray table and hygiene items. PT suggesting sneaker for R foot to decrease impact through R LE during hopping.   Patient will benefit from rehab at discharge. Current Level of Function Impacting Discharge (mobility/balance): min A x 1-2 for transfers/gait    Other factors to consider for discharge: lives with spouse who needs assistance. He will not be able to provide physical assistance. PLAN :  Patient continues to benefit from skilled intervention to address the above impairments. Continue treatment per established plan of care. to address goals. Recommendation for discharge: (in order for the patient to meet his/her long term goals)  Therapy up to 5 days/week in SNF setting    This discharge recommendation:  Has not yet been discussed the attending provider and/or case management    IF patient discharges home will need the following DME: to be determined (TBD)       SUBJECTIVE:   Patient stated I am doing ok.     OBJECTIVE DATA SUMMARY:   Critical Behavior:  Neurologic State: Alert  Orientation Level: Oriented X4  Cognition: Follows commands  Safety/Judgement: Awareness of environment, Fall prevention, Home safety, Insight into deficits  Functional Mobility Training:  Bed Mobility:  Rolling: Contact guard assistance  Supine to Sit: Contact guard assistance     Scooting: Minimum assistance        Transfers:  Sit to Stand: Minimum assistance; Assist x1  Stand to Sit: Minimum assistance        Bed to Chair: Assist x2; Minimum assistance                    Balance:  Sitting: Intact  Standing: Impaired;  With support  Standing - Static: Fair  Ambulation/Gait Training:                       Left Side Weight Bearing: Non-weight bearing                               Stairs:NT               Therapeutic Exercises:   Seated: quad sets; glut sets; SLR flexion x 10 each LE    Pain Rating:  Denies pain at this time    Activity Tolerance:   Good    After treatment patient left in no apparent distress:   Sitting in chair, Heels elevated for pressure relief, and Call bell within reach    COMMUNICATION/COLLABORATION:   The patients plan of care was discussed with: Registered nurse.      Quirino Bajwa DPT   Time Calculation: 20 mins

## 2021-11-27 LAB
CHOLEST SERPL-MCNC: 204 MG/DL
FERRITIN SERPL-MCNC: 120 NG/ML (ref 26–388)
FOLATE SERPL-MCNC: 42.4 NG/ML (ref 5–21)
HAPTOGLOB SERPL-MCNC: 288 MG/DL (ref 30–200)
HDLC SERPL-MCNC: 58 MG/DL
HDLC SERPL: 3.5 {RATIO} (ref 0–5)
IRON SATN MFR SERPL: 13 % (ref 20–50)
IRON SERPL-MCNC: 32 UG/DL (ref 35–150)
LDH SERPL L TO P-CCNC: 178 U/L (ref 81–246)
LDLC SERPL CALC-MCNC: 119.2 MG/DL (ref 0–100)
RETICS # AUTO: 0.08 M/UL (ref 0.02–0.08)
RETICS/RBC NFR AUTO: 1.9 % (ref 0.7–2.1)
TIBC SERPL-MCNC: 246 UG/DL (ref 250–450)
TRIGL SERPL-MCNC: 134 MG/DL (ref ?–150)
TSH SERPL DL<=0.05 MIU/L-ACNC: 8.57 UIU/ML (ref 0.36–3.74)
VIT B12 SERPL-MCNC: 738 PG/ML (ref 193–986)
VLDLC SERPL CALC-MCNC: 26.8 MG/DL

## 2021-11-27 PROCEDURE — 83010 ASSAY OF HAPTOGLOBIN QUANT: CPT

## 2021-11-27 PROCEDURE — 97530 THERAPEUTIC ACTIVITIES: CPT

## 2021-11-27 PROCEDURE — 84443 ASSAY THYROID STIM HORMONE: CPT

## 2021-11-27 PROCEDURE — 83540 ASSAY OF IRON: CPT

## 2021-11-27 PROCEDURE — 82728 ASSAY OF FERRITIN: CPT

## 2021-11-27 PROCEDURE — 97110 THERAPEUTIC EXERCISES: CPT

## 2021-11-27 PROCEDURE — 65270000029 HC RM PRIVATE

## 2021-11-27 PROCEDURE — 83615 LACTATE (LD) (LDH) ENZYME: CPT

## 2021-11-27 PROCEDURE — 74011250637 HC RX REV CODE- 250/637: Performed by: PHYSICIAN ASSISTANT

## 2021-11-27 PROCEDURE — 74011250636 HC RX REV CODE- 250/636: Performed by: ORTHOPAEDIC SURGERY

## 2021-11-27 PROCEDURE — 82607 VITAMIN B-12: CPT

## 2021-11-27 PROCEDURE — 80061 LIPID PANEL: CPT

## 2021-11-27 PROCEDURE — 85045 AUTOMATED RETICULOCYTE COUNT: CPT

## 2021-11-27 PROCEDURE — 82746 ASSAY OF FOLIC ACID SERUM: CPT

## 2021-11-27 PROCEDURE — 36415 COLL VENOUS BLD VENIPUNCTURE: CPT

## 2021-11-27 PROCEDURE — 74011250637 HC RX REV CODE- 250/637: Performed by: HOSPITALIST

## 2021-11-27 PROCEDURE — 74011250636 HC RX REV CODE- 250/636: Performed by: INTERNAL MEDICINE

## 2021-11-27 PROCEDURE — 74011250637 HC RX REV CODE- 250/637: Performed by: ORTHOPAEDIC SURGERY

## 2021-11-27 PROCEDURE — 74011250637 HC RX REV CODE- 250/637: Performed by: INTERNAL MEDICINE

## 2021-11-27 RX ADMIN — ACETAMINOPHEN 650 MG: 325 TABLET ORAL at 18:23

## 2021-11-27 RX ADMIN — POLYETHYLENE GLYCOL 3350 17 G: 17 POWDER, FOR SOLUTION ORAL at 10:03

## 2021-11-27 RX ADMIN — Medication 1 TABLET: at 12:49

## 2021-11-27 RX ADMIN — DOCUSATE SODIUM 50MG AND SENNOSIDES 8.6MG 1 TABLET: 8.6; 5 TABLET, FILM COATED ORAL at 18:23

## 2021-11-27 RX ADMIN — Medication 10 ML: at 22:00

## 2021-11-27 RX ADMIN — DOCUSATE SODIUM 50MG AND SENNOSIDES 8.6MG 1 TABLET: 8.6; 5 TABLET, FILM COATED ORAL at 10:00

## 2021-11-27 RX ADMIN — ACETAMINOPHEN 650 MG: 325 TABLET ORAL at 12:49

## 2021-11-27 RX ADMIN — MIRTAZAPINE 7.5 MG: 15 TABLET, FILM COATED ORAL at 22:04

## 2021-11-27 RX ADMIN — POLYETHYLENE GLYCOL 3350 17 G: 17 POWDER, FOR SOLUTION ORAL at 18:00

## 2021-11-27 RX ADMIN — Medication 10 ML: at 13:38

## 2021-11-27 RX ADMIN — ENOXAPARIN SODIUM 40 MG: 100 INJECTION SUBCUTANEOUS at 10:01

## 2021-11-27 RX ADMIN — Medication 1 TABLET: at 18:23

## 2021-11-27 RX ADMIN — Medication 10 ML: at 08:43

## 2021-11-27 RX ADMIN — TRAMADOL HYDROCHLORIDE 50 MG: 50 TABLET, FILM COATED ORAL at 12:52

## 2021-11-27 RX ADMIN — POLYETHYLENE GLYCOL 3350 17 G: 17 POWDER, FOR SOLUTION ORAL at 10:02

## 2021-11-27 RX ADMIN — Medication 10 ML: at 22:05

## 2021-11-27 RX ADMIN — ACETAMINOPHEN 650 MG: 325 TABLET ORAL at 02:43

## 2021-11-27 RX ADMIN — MORPHINE SULFATE 2 MG: 2 INJECTION, SOLUTION INTRAMUSCULAR; INTRAVENOUS at 10:01

## 2021-11-27 RX ADMIN — ACETAMINOPHEN 650 MG: 325 TABLET ORAL at 10:00

## 2021-11-27 RX ADMIN — Medication 10 ML: at 06:44

## 2021-11-27 RX ADMIN — Medication 1 TABLET: at 10:00

## 2021-11-27 RX ADMIN — TRAMADOL HYDROCHLORIDE 50 MG: 50 TABLET, FILM COATED ORAL at 02:48

## 2021-11-27 NOTE — PROGRESS NOTES
Problem: Mobility Impaired (Adult and Pediatric)  Goal: *Acute Goals and Plan of Care (Insert Text)  Description: FUNCTIONAL STATUS PRIOR TO ADMISSION: Patient was independent and active without use of DME.    HOME SUPPORT PRIOR TO ADMISSION: The patient lived with  but did not require assist. Pt's  has dementia and pt is the caregiver. Physical Therapy Goals  Initiated 11/24/2021  1. Patient will move from supine to sit and sit to supine , scoot up and down, and roll side to side in bed with modified independence within 7 day(s). 2.  Patient will transfer from bed to chair and chair to bed with supervision/set-up using the least restrictive device within 7 day(s). 3.  Patient will perform sit to stand with supervision/set-up within 7 day(s). 4.  Patient will ambulate with moderate assist for 5 feet with the least restrictive device within 7 day(s). Note:   PHYSICAL THERAPY TREATMENT  Patient: Cleopatra Beltran (89 y.o. female)  Date: 11/27/2021  Diagnosis: Bimalleolar fracture of left ankle [S82.842A]   Bimalleolar fracture of left ankle  Procedure(s) (LRB):  LEFT ANKLE WASHOUT WITH EXTERNAL FIXATION (Left) 4 Days Post-Op  Precautions: NWB  Chart, physical therapy assessment, plan of care and goals were reviewed. ASSESSMENT  Patient continues with skilled PT services and is progressing towards goals. Patient received sitting at bedside commode, able to transfer to chair. Returned later (after eating lunch) able to perform LE exercise. Only PROM on left LE and AROM on Right. Gave handout for LE on the Right LE only. Patient awaiting SNF placement and then surgical fixation. Current Level of Function Impacting Discharge (mobility/balance): MIN A with RW, able to maintain NWB    Other factors to consider for discharge:          PLAN :  Patient continues to benefit from skilled intervention to address the above impairments.   Continue treatment per established plan of care.  to address goals. Recommendation for discharge: (in order for the patient to meet his/her long term goals)  Therapy up to 5 days/week in SNF setting    This discharge recommendation:  A follow-up discussion with the attending provider and/or case management is planned    IF patient discharges home will need the following DME: to be determined (TBD)       SUBJECTIVE:   Patient stated Janey Vincent was wondering if you would come back.     OBJECTIVE DATA SUMMARY:   Critical Behavior:  Neurologic State: Alert  Orientation Level: Appropriate for age, Oriented X4  Cognition: Follows commands, Appropriate decision making  Safety/Judgement: Awareness of environment, Fall prevention, Home safety, Insight into deficits  Functional Mobility Training:  Bed Mobility:                    Transfers:  Sit to Stand: Minimum assistance; Assist x1; Additional time  Stand to Sit: Minimum assistance; Assist x1; Additional time        Bed to Chair: Minimum assistance; Assist x1; Additional time                    Balance:     Ambulation/Gait Training:                       Left Side Weight Bearing: Non-weight bearing                       Therapeutic Exercises:   Supine: ankle pumps, quad sets, heel slides, hip ABD AROM on Right LE  Left LE: hip ABD, heel slides AAROM  2 sets of 10  Pain Rating:  Premedicated prior to session    Activity Tolerance:   Good    After treatment patient left in no apparent distress:   Supine in bed, Call bell within reach, and Bed / chair alarm activated    COMMUNICATION/COLLABORATION:   The patients plan of care was discussed with: Registered nurse.      Chemo Chandra PT, DPT   Time Calculation: 15 mins

## 2021-11-27 NOTE — PROGRESS NOTES
11/27/2021  11:42 AM  Care Management Progress Note      ICD-10-CM ICD-9-CM    1. Type I or II open bimalleolar fracture of left ankle, initial encounter  S82.842B 824.5        RUR:  8%  Risk Level: [x]Low []Moderate []High  Value-based purchasing: [x] Yes [] No  Bundle patient: [] Yes [x] No   Specify:     Transition of care plan:  1. Discharge pending placement and auth. Ortho following. 2. SNF - SNF referrals pending for [facility] and [facility]. Awaiting responses. Auth will be required. 3. Outpatient follow-up. 4. Transport need TBD.

## 2021-11-27 NOTE — PROGRESS NOTES
Orthopaedic Progress Note  Post Op day: 4 Days Post-Op    2021 11:32 AM     Patient: Wilmar Almanzar MRN: [id number]  SSN: [id number]    YOB: 1935  Age: 80 y.o. Sex: female      Admit date:  2021  Date of Surgery:  2021   Procedures:  Procedure(s):  LEFT ANKLE WASHOUT WITH EXTERNAL FIXATION  Admitting Physician:  Tere Nayak MD   Surgeon:  Kali Springer) and Role:     * Floyd Hernandez MD - Primary    Consulting Physician(s): Treatment Team: Attending Provider: Guilherme Lofton MD; Consulting Provider: Floyd Hernandez MD; Consulting Provider: DEANNA Agosto; Care Manager: Chet Restrepo; Utilization Review: Harley Paige RN; Care Manager: Natalie Call    SUBJECTIVE:     Wilmar Almanzar is a 80 y.o. female is 4 Days Post-Op s/p Procedure(s):  LEFT ANKLE WASHOUT WITH EXTERNAL FIXATION, Pt tolerating exfix well. Managing up with walker without difficulty . OBJECTIVE:       Physical Exam:  General: Alert, cooperative, no distress. Respiratory: Respirations unlabored  Neurological:  Neurovascular exam within normal limits. Motor: + DF/PF. Musculoskeletal: Ex fix to left LE. Removed all bandages. Wound on anterior shin with nylon sutures intact. Oozing bright red blood. No other open wounds. All pin sits clean/ no erythema. No signs of infection. Cleansed all pin sites, applied xeroform, island dressing, kerlex and ace wraps. Pt tolerated well. Cap refill brisk. Foot is sensate. Calves soft, supple, non-tender upon palpation.                         Vital Signs:      Patient Vitals for the past 8 hrs:   BP Temp Pulse Resp SpO2   21 0757 (!) 145/80 98.2 °F (36.8 °C) 76 16 93 %                                          Temp (24hrs), Av.2 °F (36.8 °C), Min:97.8 °F (36.6 °C), Max:98.6 °F (37 °C)      Labs:        Recent Labs     21  0100   HGB 10.6*     Lab Results   Component Value Date/Time Sodium 141 11/24/2021 02:34 AM    Potassium 4.3 11/24/2021 02:34 AM    Chloride 111 (H) 11/24/2021 02:34 AM    CO2 27 11/24/2021 02:34 AM    Glucose 110 (H) 11/24/2021 02:34 AM    BUN 14 11/24/2021 02:34 AM    Creatinine 0.93 11/24/2021 02:34 AM    Calcium 8.2 (L) 11/24/2021 02:34 AM       PT/OT:                Patient mobility  Bed Mobility Training  Rolling: Contact guard assistance  Supine to Sit: Contact guard assistance  Scooting: Minimum assistance  Transfer Training  Sit to Stand: Minimum assistance, Assist x1  Stand to Sit: Minimum assistance  Bed to Chair: Assist x2, Minimum assistance          Weight Bearing Status  Left Side Weight Bearing: Non-weight bearing        ASSESSMENT / PLAN:   Principal Problem:    Bimalleolar fracture of left ankle (11/23/2021)    Active Problems:    Pure hypercholesterolemia (12/9/2016)      Gastroesophageal reflux disease without esophagitis (12/9/2016)      Chronic bilateral low back pain without sciatica (12/9/2016)      Subclinical hypothyroidism (6/21/2018)         Left LE bimalleolar ankle fracture:    -  Continue PT/OT - NWB LLE, elevation at all times   -  Continue established methods of pain control  -  VTE Prophylaxes - TEDS &/or SCDs with Lovenox 40mg daily   -  IV ancef for 48 hours completed  -  Pin care and dressing change completed by me during rounds, will follow up in AM        Discharge To:  SNF, awaiting auth                Signed By:  Fatimah Medeiros NP    Orthopedic Surgery   [address] [place] [address]

## 2021-11-27 NOTE — PROGRESS NOTES
[facility]    Medical Progress Note      NAME: Dahiana Andrews   :  1935  MRM:  [id number]    Date/Time of service 2021  11:54 AM          Assessment and Plan:     Bimalleolar L ankle fracture / Chronic bilateral low back pain without sciatica - Fx POA, due from mechanical fall that would not have damaged normal bone. Ortho consulted and S/p surgery 21. Appreciate PT/OT. SNF placement pending. Will need ORIF on ; in house or from SNF. Discharge delayed due to lack of SNF bed. Pain control with PO oxycodone, ultram or prn IV morphine    Anemia - Check serologies. Hypercholesterolemia - Not on meds, check panel    Insomnia - Prn remeron     GERD - PPI      BP transiently elevated - Due to Pain. Not HTN      Constipation - resolved with miralax       Subjective:     Chief Complaint:  Ankle pain    ROS:  (bold if positive, if negative)    Tolerating some PT  Tolerating Diet        Objective:     Last 24hrs VS reviewed since prior progress note.  Most recent are:    Visit Vitals  BP (!) 145/80   Pulse 76   Temp 98.2 °F (36.8 °C)   Resp 16   Wt 71.8 kg (158 lb 4.8 oz)   SpO2 93%   BMI 28.95 kg/m²     SpO2 Readings from Last 6 Encounters:   21 93%   10/12/21 95%   10/06/20 95%   09/15/20 98%   19 97%   19 100%            Intake/Output Summary (Last 24 hours) at 2021 0935  Last data filed at 2021 0801  Gross per 24 hour   Intake 1130 ml   Output 950 ml   Net 180 ml        Physical Exam:    Gen:  Well-developed, well-nourished, in no acute distress  HEENT:  Pink conjunctivae, PERRL, hearing intact to voice, moist mucous membranes  Neck:  Supple, without masses, thyroid non-tender  Resp:  No accessory muscle use, clear breath sounds without wheezes rales or rhonchi  Card:  No murmurs, normal S1, S2 without thrills, bruits or peripheral edema  Abd:  Soft, non-tender, non-distended, normoactive bowel sounds are present, no mass  Lymph:  No cervical or inguinal adenopathy  Musc:  No cyanosis or clubbing  Skin:  No rashes or ulcers, skin turgor is good  Neuro:  Cranial nerves are grossly intact, no focal motor weakness, follows commands appropriately  Psych:  Good insight, oriented to person, place and time, alert    Telemetry reviewed:   normal sinus rhythm  __________________________________________________________________  Medications Reviewed: (see below)  Medications:     Current Facility-Administered Medications   Medication Dose Route Frequency    polyethylene glycol (MIRALAX) packet 17 g  17 g Oral BID    hydrALAZINE (APRESOLINE) 20 mg/mL injection 10 mg  10 mg IntraVENous Q6H PRN    sodium chloride (NS) flush 5-40 mL  5-40 mL IntraVENous Q8H    sodium chloride (NS) flush 5-40 mL  5-40 mL IntraVENous PRN    polyethylene glycol (MIRALAX) packet 17 g  17 g Oral DAILY PRN    senna (SENOKOT) tablet 8.6 mg  1 Tablet Oral DAILY PRN    promethazine (PHENERGAN) tablet 12.5 mg  12.5 mg Oral Q6H PRN    Or    ondansetron (ZOFRAN) injection 4 mg  4 mg IntraVENous Q6H PRN    morphine injection 2 mg  2 mg IntraVENous Q4H PRN    pantoprazole (PROTONIX) tablet 40 mg  40 mg Oral ACB    sodium chloride (NS) flush 5-40 mL  5-40 mL IntraVENous Q8H    sodium chloride (NS) flush 5-40 mL  5-40 mL IntraVENous PRN    naloxone (NARCAN) injection 0.4 mg  0.4 mg IntraVENous PRN    calcium-vitamin D (OS-OLGA LIDIA +D3) 500 mg-200 unit per tablet 1 Tablet  1 Tablet Oral TID WITH MEALS    senna-docusate (PERICOLACE) 8.6-50 mg per tablet 1 Tablet  1 Tablet Oral BID    bisacodyL (DULCOLAX) suppository 10 mg  10 mg Rectal DAILY PRN    enoxaparin (LOVENOX) injection 40 mg  40 mg SubCUTAneous DAILY    mirtazapine (REMERON) tablet 7.5 mg  7.5 mg Oral QHS    traMADoL (ULTRAM) tablet 50 mg  50 mg Oral Q6H PRN    oxyCODONE IR (ROXICODONE) tablet 5 mg  5 mg Oral Q4H PRN    acetaminophen (TYLENOL) tablet 650 mg  650 mg Oral Q6H    Or    acetaminophen (TYLENOL) suppository 650 mg  650 mg Rectal Q6H        Lab Data Reviewed: (see below)  Lab Review:     Recent Labs     11/25/21  0100   HGB 10.6*     No results for input(s): NA, K, CL, CO2, GLU, BUN, CREA, CA, MG, PHOS, ALB, TBIL, TBILI, ALT, INR, INREXT in the last 72 hours. No lab exists for component: SGOT  Lab Results   Component Value Date/Time    Glucose (POC) 99 11/24/2021 04:28 PM    Glucose (POC) 88 11/24/2021 11:47 AM    Glucose (POC) 106 11/24/2021 06:37 AM    Glucose (POC) 125 (H) 11/23/2021 09:03 PM    Glucose (POC) 104 (H) 10/12/2021 01:14 PM    Glucose (POC) 136 (H) 10/06/2020 03:01 PM     No results for input(s): PH, PCO2, PO2, HCO3, FIO2 in the last 72 hours. No results for input(s): INR, INREXT in the last 72 hours. All Micro Results     Procedure Component Value Units Date/Time    COVID-19 RAPID TEST [[id number]] Collected: 11/23/21 0546    Order Status: Completed Specimen: Nasopharyngeal Updated: 11/23/21 0622     Specimen source Nasopharyngeal        COVID-19 rapid test Not detected        Comment: Rapid Abbott ID Now       Rapid NAAT:  The specimen is NEGATIVE for SARS-CoV-2, the novel coronavirus associated with COVID-19. Negative results should be treated as presumptive and, if inconsistent with clinical signs and symptoms or necessary for patient management, should be tested with an alternative molecular assay. Negative results do not preclude SARS-CoV-2 infection and should not be used as the sole basis for patient management decisions. This test has been authorized by the FDA under an Emergency Use Authorization (EUA) for use by authorized laboratories.    Fact sheet for Healthcare Providers: ConventionUpdate.co.nz  Fact sheet for Patients: ConventionUpdate.co.nz       Methodology: Isothermal Nucleic Acid Amplification               Other pertinent lab: none    Total time spent with patient: 30 Minutes I personally reviewed chart, notes, data and current medications in the medical record. I have personally examined and treated the patient at bedside during this period.                  Care Plan discussed with: Patient, Nursing Staff, Consultant/Specialist and >50% of time spent in counseling and coordination of care    Discussed:  Care Plan and D/C Planning    Prophylaxis:  H2B/PPI    Disposition:  SNF/LTC           ___________________________________________________    Attending Physician: Gina Guerrero MD

## 2021-11-27 NOTE — PROGRESS NOTES
Problem: Pain  Goal: *Control of Pain  Outcome: Progressing Towards Goal  Goal: *PALLIATIVE CARE:  Alleviation of Pain  Outcome: Progressing Towards Goal     Problem: Patient Education: Go to Patient Education Activity  Goal: Patient/Family Education  Outcome: Progressing Towards Goal     Problem: Falls - Risk of  Goal: *Absence of Falls  Description: Document Hermann Zheng Fall Risk and appropriate interventions in the flowsheet. Outcome: Progressing Towards Goal  Note: Fall Risk Interventions:  Mobility Interventions: Bed/chair exit alarm, Strengthening exercises (ROM-active/passive), Utilize walker, cane, or other assistive device    Mentation Interventions: Bed/chair exit alarm, Adequate sleep, hydration, pain control, More frequent rounding, Increase mobility, Toileting rounds    Medication Interventions: Bed/chair exit alarm, Patient to call before getting OOB, Teach patient to arise slowly    Elimination Interventions: Bed/chair exit alarm, Call light in reach, Toilet paper/wipes in reach, Toileting schedule/hourly rounds    History of Falls Interventions: Bed/chair exit alarm         Problem: Patient Education: Go to Patient Education Activity  Goal: Patient/Family Education  Outcome: Progressing Towards Goal     Problem:  Body Temperature -  Risk of, Imbalanced  Goal: *Absence of heat stress or hyperthermia signs and symptoms  Outcome: Progressing Towards Goal     Problem: Patient Education: Go to Patient Education Activity  Goal: Patient/Family Education  Outcome: Progressing Towards Goal     Problem: Patient Education: Go to Patient Education Activity  Goal: Patient/Family Education  Outcome: Progressing Towards Goal     Problem: Patient Education: Go to Patient Education Activity  Goal: Patient/Family Education  Outcome: Progressing Towards Goal

## 2021-11-28 PROCEDURE — 97110 THERAPEUTIC EXERCISES: CPT

## 2021-11-28 PROCEDURE — 74011250637 HC RX REV CODE- 250/637: Performed by: INTERNAL MEDICINE

## 2021-11-28 PROCEDURE — 74011250637 HC RX REV CODE- 250/637: Performed by: HOSPITALIST

## 2021-11-28 PROCEDURE — 65270000029 HC RM PRIVATE

## 2021-11-28 PROCEDURE — 74011250636 HC RX REV CODE- 250/636: Performed by: ORTHOPAEDIC SURGERY

## 2021-11-28 PROCEDURE — 74011250637 HC RX REV CODE- 250/637: Performed by: ORTHOPAEDIC SURGERY

## 2021-11-28 PROCEDURE — 74011250637 HC RX REV CODE- 250/637: Performed by: PHYSICIAN ASSISTANT

## 2021-11-28 RX ADMIN — ENOXAPARIN SODIUM 40 MG: 100 INJECTION SUBCUTANEOUS at 09:21

## 2021-11-28 RX ADMIN — ACETAMINOPHEN 650 MG: 325 TABLET ORAL at 06:05

## 2021-11-28 RX ADMIN — Medication 10 ML: at 22:45

## 2021-11-28 RX ADMIN — ACETAMINOPHEN 650 MG: 325 TABLET ORAL at 15:27

## 2021-11-28 RX ADMIN — ACETAMINOPHEN 650 MG: 325 TABLET ORAL at 18:37

## 2021-11-28 RX ADMIN — Medication 1 TABLET: at 17:24

## 2021-11-28 RX ADMIN — Medication 10 ML: at 15:28

## 2021-11-28 RX ADMIN — DOCUSATE SODIUM 50MG AND SENNOSIDES 8.6MG 1 TABLET: 8.6; 5 TABLET, FILM COATED ORAL at 09:21

## 2021-11-28 RX ADMIN — PANTOPRAZOLE SODIUM 40 MG: 40 TABLET, DELAYED RELEASE ORAL at 06:06

## 2021-11-28 RX ADMIN — Medication 1 TABLET: at 15:27

## 2021-11-28 RX ADMIN — POLYETHYLENE GLYCOL 3350 17 G: 17 POWDER, FOR SOLUTION ORAL at 09:21

## 2021-11-28 RX ADMIN — Medication 10 ML: at 15:27

## 2021-11-28 RX ADMIN — MIRTAZAPINE 7.5 MG: 15 TABLET, FILM COATED ORAL at 22:45

## 2021-11-28 RX ADMIN — DOCUSATE SODIUM 50MG AND SENNOSIDES 8.6MG 1 TABLET: 8.6; 5 TABLET, FILM COATED ORAL at 17:24

## 2021-11-28 RX ADMIN — Medication 10 ML: at 05:44

## 2021-11-28 RX ADMIN — Medication 1 TABLET: at 09:21

## 2021-11-28 RX ADMIN — Medication 10 ML: at 06:00

## 2021-11-28 NOTE — PROGRESS NOTES
11/28/2021  11:42 AM  Care Management Progress Note      ICD-10-CM ICD-9-CM    1. Type I or II open bimalleolar fracture of left ankle, initial encounter  S82.842B 824.5        RUR:  8%  Risk Level: [x]Low []Moderate []High  Value-based purchasing: [x] Yes [] No  Bundle patient: [] Yes [x] No   Specify:     Transition of care plan:  1. Discharge pending placement and auth. Ortho following. Surgery planned for next week (OP vs INP). 2. SNF - SNF referrals pending for [facility], [address] [place] [address], and [facility]. Awaiting responses. Auth will be required. 3. Outpatient follow-up. 4. Transport need TBD.

## 2021-11-28 NOTE — PROGRESS NOTES
Spiritual Care Assessment/Progress Note  [address]      NAME: Wilmar Almanzar      MRN: [id number]  AGE: 80 y.o. SEX: female  Amish Affiliation: Buddhism   Language: English     11/28/2021     Total Time (in minutes): 10     Spiritual Assessment begun in [facility] POST SURG ORT 2 through conversation with:         []Patient        [] Family    [] Friend(s)        Reason for Consult: Initial visit     Spiritual beliefs: (Please include comment if needed)     [] Identifies with a alphonso tradition:         [] Supported by a alphonso community:            [] Claims no spiritual orientation:           [] Seeking spiritual identity:                [] Adheres to an individual form of spirituality:           [x] Not able to assess:                           Identified resources for coping:      [] Prayer                               [] Music                  [] Guided Imagery     [] Family/friends                 [] Pet visits     [] Devotional reading                         [x] Unknown     [] Other:                                               Interventions offered during this visit: (See comments for more details)    Patient Interventions: Initial visit           Plan of Care:     [] Support spiritual and/or cultural needs    [] Support AMD and/or advance care planning process      [] Support grieving process   [] Coordinate Rites and/or Rituals    [] Coordination with community clergy   [] No spiritual needs identified at this time   [] Detailed Plan of Care below (See Comments)  [] Make referral to Music Therapy  [] Make referral to Pet Therapy     [] Make referral to Addiction services  [] Make referral to [facility]  [] Make referral to Spiritual Care Partner  [] No future visits requested        [x] Contact Spiritual Care for further referrals     Attempted visit for initial spiritual assessment. Unable to visit patient at this time. Pt was sleeping and did not awake. No family present.  Pt's chart was consulted.   Chaplain Clark MDiv, MS, [facility]

## 2021-11-28 NOTE — PROGRESS NOTES
Problem: Mobility Impaired (Adult and Pediatric)  Goal: *Acute Goals and Plan of Care (Insert Text)  Description: FUNCTIONAL STATUS PRIOR TO ADMISSION: Patient was independent and active without use of DME.    HOME SUPPORT PRIOR TO ADMISSION: The patient lived with  but did not require assist. Pt's  has dementia and pt is the caregiver. Physical Therapy Goals  Initiated 11/24/2021  1. Patient will move from supine to sit and sit to supine , scoot up and down, and roll side to side in bed with modified independence within 7 day(s). 2.  Patient will transfer from bed to chair and chair to bed with supervision/set-up using the least restrictive device within 7 day(s). 3.  Patient will perform sit to stand with supervision/set-up within 7 day(s). 4.  Patient will ambulate with moderate assist for 5 feet with the least restrictive device within 7 day(s). Outcome: Progressing Towards Goal   PHYSICAL THERAPY TREATMENT  Patient: Yeimy Hayes (73 y.o. female)  Date: 11/28/2021  Diagnosis: Bimalleolar fracture of left ankle [S82.842A]   Bimalleolar fracture of left ankle  Procedure(s) (LRB):  LEFT ANKLE WASHOUT WITH EXTERNAL FIXATION (Left) 5 Days Post-Op  Precautions: NWB  Chart, physical therapy assessment, plan of care and goals were reviewed. ASSESSMENT  Patient continues with skilled PT services and is progressing towards goals. Deferred transfer to the chair this day as pt reports she just got to/from [facility] and this writer noted increased drainage from ace wrap at pt's heel. RN aware. Linens changed and elevated LLE. Pt performed HEP with verbal cueing and all bed mobility at a SUP level. Agreeable to transfer to chair with nursing for dinner after dressing is reinforced.      Current Level of Function Impacting Discharge (mobility/balance): awaiting SNF bed    Other factors to consider for discharge: return to OR for ORIF         PLAN :  Patient continues to benefit from skilled intervention to address the above impairments. Continue treatment per established plan of care. to address goals. Recommendation for discharge: (in order for the patient to meet his/her long term goals)  Therapy up to 5 days/week in SNF setting    This discharge recommendation:  Has been made in collaboration with the attending provider and/or case management    IF patient discharges home will need the following DME: rolling walker and wheelchair       SUBJECTIVE:   Patient stated I don't have much time to do all the exercises. They keep me busy here.     OBJECTIVE DATA SUMMARY:   Critical Behavior:  Neurologic State: Alert  Orientation Level: Oriented X4  Cognition: Follows commands, Appropriate decision making  Safety/Judgement: Awareness of environment, Fall prevention, Home safety, Insight into deficits  Functional Mobility Training:  Bed Mobility:  Rolling: Supervision        Scooting: Supervision        Transfers:                                   Balance:     Ambulation/Gait Training:                                           Activity Tolerance:   Good    After treatment patient left in no apparent distress:   Supine in bed, Heels elevated for pressure relief, Call bell within reach, and Side rails x 3    COMMUNICATION/COLLABORATION:   The patients plan of care was discussed with: Registered nurse.      Elda Zamudio, PT   Time Calculation: 10 mins

## 2021-11-28 NOTE — PROGRESS NOTES
[facility]    Medical Progress Note      NAME: José Miguel Berger   :  1935  MRM:  [id number]    Date/Time of service 2021  11:54 AM          Assessment and Plan:     Bimalleolar L ankle fracture / Chronic bilateral low back pain without sciatica - Fx POA, due from mechanical fall that would not have damaged normal bone. Ortho consulted and S/p surgery 21. Appreciate PT/OT. SNF placement pending. Will need ORIF on ; in house or from SNF. Discharge delayed due to lack of SNF bed. Pain control with PO oxycodone, ultram or prn IV morphine    Anemia - Somewhat low iron on serologies. DC on PO iron    Hypercholesterolemia - Not on meds,  and trig 119 on panel    Insomnia - Prn remeron     GERD - PPI      BP transiently elevated - Due to Pain. Not HTN      Constipation - resolved with miralax       Subjective:     Chief Complaint:  Ankle pain    ROS:  (bold if positive, if negative)    Tolerating some PT  Tolerating Diet        Objective:     Last 24hrs VS reviewed since prior progress note.  Most recent are:    Visit Vitals  /70 (BP 1 Location: Right upper arm, BP Patient Position: At rest)   Pulse 69   Temp 98.2 °F (36.8 °C)   Resp 17   Wt 71.8 kg (158 lb 4.8 oz)   SpO2 95%   BMI 28.95 kg/m²     SpO2 Readings from Last 6 Encounters:   21 95%   10/12/21 95%   10/06/20 95%   09/15/20 98%   19 97%   19 100%            Intake/Output Summary (Last 24 hours) at 2021 1045  Last data filed at 2021 0919  Gross per 24 hour   Intake 1080 ml   Output --   Net 1080 ml        Physical Exam:    Gen:  Well-developed, well-nourished, in no acute distress  HEENT:  Pink conjunctivae, PERRL, hearing intact to voice, moist mucous membranes  Neck:  Supple, without masses, thyroid non-tender  Resp:  No accessory muscle use, clear breath sounds without wheezes rales or rhonchi  Card:  No murmurs, normal S1, S2 without thrills, bruits or peripheral edema  Abd:  Soft, non-tender, non-distended, normoactive bowel sounds are present, no mass  Lymph:  No cervical or inguinal adenopathy  Musc:  No cyanosis or clubbing  Skin:  No rashes or ulcers, skin turgor is good  Neuro:  Cranial nerves are grossly intact, no focal motor weakness, follows commands appropriately  Psych:  Good insight, oriented to person, place and time, alert    Telemetry reviewed:   normal sinus rhythm  __________________________________________________________________  Medications Reviewed: (see below)  Medications:     Current Facility-Administered Medications   Medication Dose Route Frequency    polyethylene glycol (MIRALAX) packet 17 g  17 g Oral BID    hydrALAZINE (APRESOLINE) 20 mg/mL injection 10 mg  10 mg IntraVENous Q6H PRN    sodium chloride (NS) flush 5-40 mL  5-40 mL IntraVENous Q8H    sodium chloride (NS) flush 5-40 mL  5-40 mL IntraVENous PRN    polyethylene glycol (MIRALAX) packet 17 g  17 g Oral DAILY PRN    senna (SENOKOT) tablet 8.6 mg  1 Tablet Oral DAILY PRN    promethazine (PHENERGAN) tablet 12.5 mg  12.5 mg Oral Q6H PRN    Or    ondansetron (ZOFRAN) injection 4 mg  4 mg IntraVENous Q6H PRN    morphine injection 2 mg  2 mg IntraVENous Q4H PRN    pantoprazole (PROTONIX) tablet 40 mg  40 mg Oral ACB    sodium chloride (NS) flush 5-40 mL  5-40 mL IntraVENous Q8H    sodium chloride (NS) flush 5-40 mL  5-40 mL IntraVENous PRN    naloxone (NARCAN) injection 0.4 mg  0.4 mg IntraVENous PRN    calcium-vitamin D (OS-OLGA LIDIA +D3) 500 mg-200 unit per tablet 1 Tablet  1 Tablet Oral TID WITH MEALS    senna-docusate (PERICOLACE) 8.6-50 mg per tablet 1 Tablet  1 Tablet Oral BID    bisacodyL (DULCOLAX) suppository 10 mg  10 mg Rectal DAILY PRN    enoxaparin (LOVENOX) injection 40 mg  40 mg SubCUTAneous DAILY    mirtazapine (REMERON) tablet 7.5 mg  7.5 mg Oral QHS    traMADoL (ULTRAM) tablet 50 mg  50 mg Oral Q6H PRN    oxyCODONE IR (ROXICODONE) tablet 5 mg  5 mg Oral Q4H PRN  acetaminophen (TYLENOL) tablet 650 mg  650 mg Oral Q6H        Lab Data Reviewed: (see below)  Lab Review:     No results for input(s): WBC, HGB, HCT, PLT, HGBEXT, HCTEXT, PLTEXT, HGBEXT, HCTEXT, PLTEXT in the last 72 hours. No results for input(s): NA, K, CL, CO2, GLU, BUN, CREA, CA, MG, PHOS, ALB, TBIL, TBILI, ALT, INR, INREXT, INREXT in the last 72 hours. No lab exists for component: SGOT  Lab Results   Component Value Date/Time    Glucose (POC) 99 11/24/2021 04:28 PM    Glucose (POC) 88 11/24/2021 11:47 AM    Glucose (POC) 106 11/24/2021 06:37 AM    Glucose (POC) 125 (H) 11/23/2021 09:03 PM    Glucose (POC) 104 (H) 10/12/2021 01:14 PM    Glucose (POC) 136 (H) 10/06/2020 03:01 PM     No results for input(s): PH, PCO2, PO2, HCO3, FIO2 in the last 72 hours. No results for input(s): INR, INREXT, INREXT in the last 72 hours. All Micro Results     Procedure Component Value Units Date/Time    COVID-19 RAPID TEST [[id number]] Collected: 11/23/21 0546    Order Status: Completed Specimen: Nasopharyngeal Updated: 11/23/21 0622     Specimen source Nasopharyngeal        COVID-19 rapid test Not detected        Comment: Rapid Abbott ID Now       Rapid NAAT:  The specimen is NEGATIVE for SARS-CoV-2, the novel coronavirus associated with COVID-19. Negative results should be treated as presumptive and, if inconsistent with clinical signs and symptoms or necessary for patient management, should be tested with an alternative molecular assay. Negative results do not preclude SARS-CoV-2 infection and should not be used as the sole basis for patient management decisions. This test has been authorized by the FDA under an Emergency Use Authorization (EUA) for use by authorized laboratories.    Fact sheet for Healthcare Providers: kstattoo.com  Fact sheet for Patients: kstattoo.com       Methodology: Isothermal Nucleic Acid Amplification               Other pertinent lab: none    Total time spent with patient: 30 Minutes I personally reviewed chart, notes, data and current medications in the medical record. I have personally examined and treated the patient at bedside during this period.                  Care Plan discussed with: Patient, Nursing Staff, Consultant/Specialist and >50% of time spent in counseling and coordination of care    Discussed:  Care Plan and D/C Planning    Prophylaxis:  H2B/PPI    Disposition:  SNF/LTC           ___________________________________________________    Attending Physician: Michelle Melendez MD

## 2021-11-28 NOTE — PROGRESS NOTES
Progress note:    Pt resting in bed. Dressing in left lower extremity with bandages saturated with serosanguinous drainage. VSS  Alert and oriented x 3  Breathing even/ nonlabored  Removed ace on LLE / saturated at heel. Anterior wound without drainage. Sutures intact. Lateral foot pin site with significant drainage (serosangious) . Performed pin care/ bulky dressings/ ace. PT remains NVI. Left LE bimalleolar ankle fracture:     -  Continue PT/OT - NWB LLE, elevation at all times   -  Continue established methods of pain control  -  VTE Prophylaxes - TEDS &/or SCDs with Lovenox 40mg daily   -  IV ancef for 48 hours completed  -  Pin care and dressing change completed by me during rounds.   - Ortho to reassess in am.       Discharge To:  SNF, awaiting Santosh Pascual NP

## 2021-11-29 PROCEDURE — 97110 THERAPEUTIC EXERCISES: CPT

## 2021-11-29 PROCEDURE — 74011250637 HC RX REV CODE- 250/637: Performed by: INTERNAL MEDICINE

## 2021-11-29 PROCEDURE — 65270000029 HC RM PRIVATE

## 2021-11-29 PROCEDURE — 74011250637 HC RX REV CODE- 250/637: Performed by: PHYSICIAN ASSISTANT

## 2021-11-29 PROCEDURE — 74011250637 HC RX REV CODE- 250/637: Performed by: ORTHOPAEDIC SURGERY

## 2021-11-29 PROCEDURE — 97535 SELF CARE MNGMENT TRAINING: CPT

## 2021-11-29 PROCEDURE — 74011250636 HC RX REV CODE- 250/636: Performed by: ORTHOPAEDIC SURGERY

## 2021-11-29 PROCEDURE — 74011250637 HC RX REV CODE- 250/637: Performed by: HOSPITALIST

## 2021-11-29 RX ADMIN — DOCUSATE SODIUM 50MG AND SENNOSIDES 8.6MG 1 TABLET: 8.6; 5 TABLET, FILM COATED ORAL at 09:00

## 2021-11-29 RX ADMIN — ENOXAPARIN SODIUM 40 MG: 100 INJECTION SUBCUTANEOUS at 09:00

## 2021-11-29 RX ADMIN — ACETAMINOPHEN 650 MG: 325 TABLET ORAL at 06:49

## 2021-11-29 RX ADMIN — Medication 10 ML: at 14:56

## 2021-11-29 RX ADMIN — Medication 10 ML: at 22:29

## 2021-11-29 RX ADMIN — ACETAMINOPHEN 650 MG: 325 TABLET ORAL at 12:31

## 2021-11-29 RX ADMIN — ACETAMINOPHEN 650 MG: 325 TABLET ORAL at 19:56

## 2021-11-29 RX ADMIN — Medication 1 TABLET: at 09:00

## 2021-11-29 RX ADMIN — ACETAMINOPHEN 650 MG: 325 TABLET ORAL at 00:45

## 2021-11-29 RX ADMIN — Medication 1 TABLET: at 12:31

## 2021-11-29 RX ADMIN — PANTOPRAZOLE SODIUM 40 MG: 40 TABLET, DELAYED RELEASE ORAL at 06:49

## 2021-11-29 RX ADMIN — Medication 10 ML: at 06:49

## 2021-11-29 RX ADMIN — Medication 10 ML: at 22:30

## 2021-11-29 RX ADMIN — MIRTAZAPINE 7.5 MG: 15 TABLET, FILM COATED ORAL at 22:30

## 2021-11-29 RX ADMIN — Medication 1 TABLET: at 17:12

## 2021-11-29 RX ADMIN — POLYETHYLENE GLYCOL 3350 17 G: 17 POWDER, FOR SOLUTION ORAL at 09:00

## 2021-11-29 NOTE — PROGRESS NOTES
Nutrition Assessment     Type and Reason for Visit: Initial, RD nutrition re-screen/LOS    Nutrition Recommendations/Plan:   1. Continue Regular diet  2. Added Ensure HP BID for wound healing  3. Monitor next surgery 12/2? Nutrition Assessment:     Pt Bimalleolar fracture of left ankle [Z07.669B] Pt  has a past medical history of Arthritis, Gastrointestinal disorder, GERD (gastroesophageal reflux disease), HLD. Pt screened for LOS. Pt had GLF with left ankle fracture. Followed by ortho - pt is POD 6 for left ankle washout with external fixation. Pt was active prior to this. No wt changes PTA. PO is currently good with intakes >75% on average. Pt now likely scheduled for second surgery on 12/2 per IDR. Added Ensure HP BID for increased protein needs pt needs from first surgery and will need for wound healing. No reported n/v, c/d. NKFA. No chew/swallow difficulties PTA. Monitor next surgery and wound healing. BG mostly controlled. No A1c. Other labs reviewed. Patient Vitals for the past 168 hrs:   % Diet Eaten   11/29/21 1200 51 - 75%   11/29/21 0904 76 - 100%   11/29/21 0841 51 - 75%   11/28/21 1756 51 - 75%   11/28/21 1256 26 - 50%   11/28/21 0919 76 - 100%   11/27/21 1843 76 - 100%   11/27/21 1159 76 - 100%   11/27/21 0801 51 - 75%   11/26/21 1819 51 - 75%   11/26/21 1500 51 - 75%   11/26/21 1039 51 - 75%   11/26/21 0647 1 - 25%   11/23/21 1345 1 - 25%     Wt Readings from Last 30 Encounters:   11/26/21 71.8 kg (158 lb 4.8 oz)   10/12/21 66.7 kg (147 lb 1.6 oz)   10/06/20 67.1 kg (147 lb 14.4 oz)   09/15/20 68 kg (150 lb)   06/25/19 68.4 kg (150 lb 12.8 oz)   01/04/19 68 kg (150 lb)   12/18/18 68 kg (150 lb)   06/21/18 69.9 kg (154 lb)   11/28/17 71.6 kg (157 lb 12.8 oz)   10/31/17 69.8 kg (153 lb 14.4 oz)   10/17/17 70.3 kg (155 lb)   04/18/17 71.1 kg (156 lb 12.8 oz)   12/09/16 72.1 kg (159 lb)   10/31/16 72.4 kg (159 lb 9.8 oz)          Malnutrition Assessment:  Malnutrition Status:  At risk for malnutrition (specify)     Estimated Daily Nutrient Needs:  Energy (kcal):  9514 (BMRx1. 3)  Protein (g):  86       Fluid (ml/day):  1500 for elderly    Nutrition Related Findings:         Current Nutrition Therapies:  ADULT DIET Regular    Anthropometric Measures:  · Height:  5' 2\" (157.5 cm)  · Current Body Wt:  71.8 kg (158 lb 4.6 oz)  · BMI: 28.9    Nutrition Diagnosis:   · Increased nutrient needs related to increased demand for energy/nutrients as evidenced by wounds      Nutrition Intervention:  Food and/or Nutrient Delivery: Continue current diet, Start oral nutrition supplement  Nutrition Education and Counseling: No recommendations at this time  Coordination of Nutrition Care: Continue to monitor while inpatient, Interdisciplinary rounds    Goals:  PO >50% of meals and 1-2 ONS per day within 3-5 days       Nutrition Monitoring and Evaluation:   Behavioral-Environmental Outcomes: None identified  Food/Nutrient Intake Outcomes: Food and nutrient intake, Supplement intake  Physical Signs/Symptoms Outcomes: Biochemical data, Weight, Skin    Discharge Planning:    Continue oral nutrition supplement, Continue current diet     Electronically signed by Anushka Barrera RD on 11/29/2021    Contact Number: [phone]

## 2021-11-29 NOTE — PROGRESS NOTES
Orthopaedic Progress Note  Post Op day: 6 Days Post-Op    November 29, 2021 2:37 PM     Patient: Karli Bar MRN: [id number]  SSN: [id number]    YOB: 1935  Age: 80 y.o. Sex: female      Admit date:  11/23/2021  Date of Surgery:  11/23/2021   Procedures:  Procedure(s):  LEFT ANKLE WASHOUT WITH EXTERNAL FIXATION  Admitting Physician:  Keron Amaya MD   Surgeon:  Oral Brown) and Role:     * Dedra Bethea MD - Primary    Consulting Physician(s): Treatment Team: Attending Provider: Antonio Szymanski MD; Consulting Provider: Dedra Bethea MD; Consulting Provider: DEANNA Granados; Care Manager: Nara Salazar; Utilization Review: Caroline Segura RN; Care Manager: Kelli Weinberg Primary Nurse: Jenna De La Rosa RN; Staff Nurse: Neli Soto RN    SUBJECTIVE:     Karli Bar is a 80 y.o. female is 6 Days Post-Op s/p Procedure(s):  LEFT ANKLE WASHOUT WITH EXTERNAL FIXATION with an appropriate level of post-operative pain. No complaints of nausea, vomiting, dizziness, lightheadedness, chest pain, or shortness of breath. OBJECTIVE:       Physical Exam:  General: Alert, cooperative, no distress. Respiratory: Respirations unlabored  Neurological:  Neurovascular exam within normal limits. Motor: + DF/PF. Musculoskeletal: Calves soft, supple, non-tender upon palpation. Dressing/Wound:  Proximal pin site with slight maceration without ulceration. The other pin sites are clean and dry. The anterior wound has dried blood on it. There is no drainage from the anterior wound. Skin has dimpled and wrinkled. No edema in the distal tibia or foot. BCR of all digits. SILT foot. +DP pulse.                     Vital Signs:      Patient Vitals for the past 8 hrs:   BP Temp Pulse Resp SpO2   11/29/21 1131 (!) 149/77 97.4 °F (36.3 °C) 74 18 95 %   11/29/21 0841 126/69 98 °F (36.7 °C) 77 18 95 %                                          Temp (24hrs), Av °F (36.7 °C), Min:97.4 °F (36.3 °C), Max:98.5 °F (36.9 °C)      Labs:      No results for input(s): HCT, HGB, INR, HCTEXT, HGBEXT, INREXT in the last 72 hours. Lab Results   Component Value Date/Time    Sodium 141 2021 02:34 AM    Potassium 4.3 2021 02:34 AM    Chloride 111 (H) 2021 02:34 AM    CO2 27 2021 02:34 AM    Glucose 110 (H) 2021 02:34 AM    BUN 14 2021 02:34 AM    Creatinine 0.93 2021 02:34 AM    Calcium 8.2 (L) 2021 02:34 AM       PT/OT:        Gait  Ambulation - Level of Assistance: Minimal assistance  Distance (ft): 3 Feet (ft)  Assistive Device: Gait belt, Walker, rolling       Patient mobility  Bed Mobility Training  Rolling: Supervision  Supine to Sit: Contact guard assistance  Scooting: Supervision  Transfer Training  Sit to Stand: Contact guard assistance  Stand to Sit: Minimum assistance  Bed to Chair: Minimum assistance      Gait Training  Assistive Device: Gait belt, Walker, rolling  Ambulation - Level of Assistance: Minimal assistance  Distance (ft): 3 Feet (ft)   Weight Bearing Status  Left Side Weight Bearing: Non-weight bearing        ASSESSMENT / PLAN:   Principal Problem:    Bimalleolar fracture of left ankle (2021)    Active Problems:    Pure hypercholesterolemia (2016)      Gastroesophageal reflux disease without esophagitis (2016)      Chronic bilateral low back pain without sciatica (2016)      Subclinical hypothyroidism (2018)         A: 1. S/P external fixator for Type 2 open bimalleolar fracture POD 6    P: 1. Continue with daily wound care and pin site care. Avoid excessive oil dressing to proximal tibial pin site due to maceration. 2. Plan for ORIF Thursday with Dr. Parish Palacio if skin continues to improve. If she remains inpatient through Wednesday we will evaluate Wednesday am and make determination for removal of ex fix and ORIF on Thursday. 3. DVT ppx: Lovenox 40 mg SC daily. SCDs  4. Orthopedics to follow  5. Pain controlled on current regimen.           Signed By:  Sera Maxwell, [address] [facility] [address]

## 2021-11-29 NOTE — PROGRESS NOTES
[facility]    Medical Progress Note      NAME: Belinda Puente   :  1935  MRM:  [id number]    Date/Time of service 2021  11:54 AM          Assessment and Plan:     Bimalleolar L ankle fracture / Chronic bilateral low back pain without sciatica - Fx POA, due from mechanical fall that would not have damaged normal bone. Ortho consulted and S/p surgery 21. Appreciate PT/OT. SNF placement pending, but that was so delayed that now she should stay, to have ORIF on . Up until then, her discharge delayed due to lack of SNF bed. Pain control with PO oxycodone, ultram or prn IV morphine    Anemia - Somewhat low iron on serologies. DC on PO iron    Hypercholesterolemia - Not on meds,  and trig 119 on panel    Insomnia - Prn remeron     GERD - PPI      BP transiently elevated - Due to Pain. Not HTN      Constipation - resolved with miralax       Subjective:     Chief Complaint:  Ankle pain mild    ROS:  (bold if positive, if negative)    Tolerating some PT  Tolerating Diet        Objective:     Last 24hrs VS reviewed since prior progress note.  Most recent are:    Visit Vitals  /69 (BP 1 Location: Left upper arm, BP Patient Position: At rest)   Pulse 77   Temp 98 °F (36.7 °C)   Resp 18   Wt 71.8 kg (158 lb 4.8 oz)   SpO2 95%   BMI 28.95 kg/m²     SpO2 Readings from Last 6 Encounters:   21 95%   10/12/21 95%   10/06/20 95%   09/15/20 98%   19 97%   19 100%            Intake/Output Summary (Last 24 hours) at 2021 1107  Last data filed at 2021 0841  Gross per 24 hour   Intake 960 ml   Output --   Net 960 ml        Physical Exam:    Gen:  Well-developed, well-nourished, in no acute distress  HEENT:  Pink conjunctivae, PERRL, hearing intact to voice, moist mucous membranes  Neck:  Supple, without masses, thyroid non-tender  Resp:  No accessory muscle use, clear breath sounds without wheezes rales or rhonchi  Card:  No murmurs, normal S1, S2 without thrills, bruits or peripheral edema  Abd:  Soft, non-tender, non-distended, normoactive bowel sounds are present, no mass  Lymph:  No cervical or inguinal adenopathy  Musc:  No cyanosis or clubbing  Skin:  No rashes or ulcers, skin turgor is good  Neuro:  Cranial nerves are grossly intact, no focal motor weakness, follows commands appropriately  Psych:  Good insight, oriented to person, place and time, alert    Telemetry reviewed:   normal sinus rhythm  __________________________________________________________________  Medications Reviewed: (see below)  Medications:     Current Facility-Administered Medications   Medication Dose Route Frequency    polyethylene glycol (MIRALAX) packet 17 g  17 g Oral BID    hydrALAZINE (APRESOLINE) 20 mg/mL injection 10 mg  10 mg IntraVENous Q6H PRN    sodium chloride (NS) flush 5-40 mL  5-40 mL IntraVENous Q8H    sodium chloride (NS) flush 5-40 mL  5-40 mL IntraVENous PRN    polyethylene glycol (MIRALAX) packet 17 g  17 g Oral DAILY PRN    senna (SENOKOT) tablet 8.6 mg  1 Tablet Oral DAILY PRN    promethazine (PHENERGAN) tablet 12.5 mg  12.5 mg Oral Q6H PRN    Or    ondansetron (ZOFRAN) injection 4 mg  4 mg IntraVENous Q6H PRN    morphine injection 2 mg  2 mg IntraVENous Q4H PRN    pantoprazole (PROTONIX) tablet 40 mg  40 mg Oral ACB    sodium chloride (NS) flush 5-40 mL  5-40 mL IntraVENous Q8H    sodium chloride (NS) flush 5-40 mL  5-40 mL IntraVENous PRN    naloxone (NARCAN) injection 0.4 mg  0.4 mg IntraVENous PRN    calcium-vitamin D (OS-OLGA LIDIA +D3) 500 mg-200 unit per tablet 1 Tablet  1 Tablet Oral TID WITH MEALS    senna-docusate (PERICOLACE) 8.6-50 mg per tablet 1 Tablet  1 Tablet Oral BID    bisacodyL (DULCOLAX) suppository 10 mg  10 mg Rectal DAILY PRN    enoxaparin (LOVENOX) injection 40 mg  40 mg SubCUTAneous DAILY    mirtazapine (REMERON) tablet 7.5 mg  7.5 mg Oral QHS    traMADoL (ULTRAM) tablet 50 mg  50 mg Oral Q6H PRN    oxyCODONE IR (ROXICODONE) tablet 5 mg  5 mg Oral Q4H PRN    acetaminophen (TYLENOL) tablet 650 mg  650 mg Oral Q6H        Lab Data Reviewed: (see below)  Lab Review:     No results for input(s): WBC, HGB, HCT, PLT, HGBEXT, HCTEXT, PLTEXT, HGBEXT, HCTEXT, PLTEXT in the last 72 hours. No results for input(s): NA, K, CL, CO2, GLU, BUN, CREA, CA, MG, PHOS, ALB, TBIL, TBILI, ALT, INR, INREXT, INREXT in the last 72 hours. No lab exists for component: SGOT  Lab Results   Component Value Date/Time    Glucose (POC) 99 11/24/2021 04:28 PM    Glucose (POC) 88 11/24/2021 11:47 AM    Glucose (POC) 106 11/24/2021 06:37 AM    Glucose (POC) 125 (H) 11/23/2021 09:03 PM    Glucose (POC) 104 (H) 10/12/2021 01:14 PM    Glucose (POC) 136 (H) 10/06/2020 03:01 PM     No results for input(s): PH, PCO2, PO2, HCO3, FIO2 in the last 72 hours. No results for input(s): INR, INREXT, INREXT in the last 72 hours. All Micro Results     Procedure Component Value Units Date/Time    COVID-19 RAPID TEST [[id number]] Collected: 11/23/21 0546    Order Status: Completed Specimen: Nasopharyngeal Updated: 11/23/21 0622     Specimen source Nasopharyngeal        COVID-19 rapid test Not detected        Comment: Rapid Abbott ID Now       Rapid NAAT:  The specimen is NEGATIVE for SARS-CoV-2, the novel coronavirus associated with COVID-19. Negative results should be treated as presumptive and, if inconsistent with clinical signs and symptoms or necessary for patient management, should be tested with an alternative molecular assay. Negative results do not preclude SARS-CoV-2 infection and should not be used as the sole basis for patient management decisions. This test has been authorized by the FDA under an Emergency Use Authorization (EUA) for use by authorized laboratories.    Fact sheet for Healthcare Providers: kstattoo.com  Fact sheet for Patients: kstattoo.com       Methodology: Isothermal Nucleic Acid Amplification               Other pertinent lab: none    Total time spent with patient: 30 Minutes I personally reviewed chart, notes, data and current medications in the medical record. I have personally examined and treated the patient at bedside during this period.                  Care Plan discussed with: Patient, Nursing Staff, Consultant/Specialist and >50% of time spent in counseling and coordination of care    Discussed:  Care Plan and D/C Planning    Prophylaxis:  H2B/PPI    Disposition:  SNF/LTC           ___________________________________________________    Attending Physician: Angelica Johns MD

## 2021-11-29 NOTE — PROGRESS NOTES
Problem: Mobility Impaired (Adult and Pediatric)  Goal: *Acute Goals and Plan of Care (Insert Text)  Description: FUNCTIONAL STATUS PRIOR TO ADMISSION: Patient was independent and active without use of DME.    HOME SUPPORT PRIOR TO ADMISSION: The patient lived with  but did not require assist. Pt's  has dementia and pt is the caregiver. Physical Therapy Goals  Initiated 11/24/2021  1. Patient will move from supine to sit and sit to supine , scoot up and down, and roll side to side in bed with modified independence within 7 day(s). 2.  Patient will transfer from bed to chair and chair to bed with supervision/set-up using the least restrictive device within 7 day(s). 3.  Patient will perform sit to stand with supervision/set-up within 7 day(s). 4.  Patient will ambulate with moderate assist for 5 feet with the least restrictive device within 7 day(s). Outcome: Progressing Towards Goal   PHYSICAL THERAPY TREATMENT  Patient: Cleopatra Beltran (83 y.o. female)  Date: 11/29/2021  Diagnosis: Bimalleolar fracture of left ankle [S82.842A]   Bimalleolar fracture of left ankle  Procedure(s) (LRB):  LEFT ANKLE WASHOUT WITH EXTERNAL FIXATION (Left) 6 Days Post-Op  Precautions: NWB  Chart, physical therapy assessment, plan of care and goals were reviewed. ASSESSMENT  Patient continues with skilled PT services and is progressing towards goals. Patient very motivated to increase exercises. Awaiting SNF. Current Level of Function Impacting Discharge (mobility/balance): Received up on bedside commode and nursing present. Patient able to perform all hygiene needs after bowel movement. She stood and took a few steps with rolling walker and min assist to recliner. Needs cues to stay close to walker and reach back for chair. Slightly impulsive. Maintains NWB status well. Performed UE and LE exercises  with verbal instruction, while seated in chair with LLE elevated.      Other factors to consider for discharge: none         PLAN :  Patient continues to benefit from skilled intervention to address the above impairments. Continue treatment per established plan of care. to address goals. Recommendation for discharge: (in order for the patient to meet his/her long term goals)  Therapy up to 5 days/week in SNF setting    This discharge recommendation:  Has not yet been discussed the attending provider and/or case management    IF patient discharges home will need the following DME: none       SUBJECTIVE:   Patient stated I played pickle ball before all this happened; have you ever played? Kylie Evans    OBJECTIVE DATA SUMMARY:   Critical Behavior:  Neurologic State: Alert  Orientation Level: Oriented X4  Cognition: Appropriate decision making, Appropriate for age attention/concentration, Appropriate safety awareness, Follows commands  Safety/Judgement: Awareness of environment, Fall prevention, Home safety, Insight into deficits  Functional Mobility Training:                      Transfers:  Sit to Stand: Contact guard assistance  Stand to Sit: Minimum assistance        Bed to Chair: Minimum assistance                    Balance:  Sitting: Intact  Standing: Impaired; With support  Standing - Static: Fair  Standing - Dynamic : Constant support  Ambulation/Gait Training:  Distance (ft): 3 Feet (ft)  Assistive Device: Gait belt; Walker, rolling  Ambulation - Level of Assistance: Minimal assistance              Left Side Weight Bearing: Non-weight bearing                                         Therapeutic Exercises:   UE shoulder flexion and bicep curls; RLE SLR, hip abduction, heel slides, LLE hip abduction, SLR  Pain Ratin/10 (nurse in room medicating patient)    Activity Tolerance:   Good    After treatment patient left in no apparent distress:   Sitting in chair and Call bell within reach    COMMUNICATION/COLLABORATION:   The patients plan of care was discussed with: Registered nurseTricia Vazquez Davian Gutierrez, PT   Time Calculation: 18 mins

## 2021-11-29 NOTE — PROGRESS NOTES
Transition of care plan - RUR 7%:  1. CM following  2. Ortho following - will evaluate on Wednesday for possible surgery on Thursday. 3. PT/OT following - recommend SNF  4. SNF referrals pending with [facility] [address], and [facility]. Awaiting responses. Will need insurance auth prior to placement.   5. Transport at d/c HANK Dorsey LCSW

## 2021-11-29 NOTE — PROGRESS NOTES
Problem: Pain  Goal: *Control of Pain  Outcome: Progressing Towards Goal  Goal: *PALLIATIVE CARE:  Alleviation of Pain  Outcome: Progressing Towards Goal     Problem: Patient Education: Go to Patient Education Activity  Goal: Patient/Family Education  Outcome: Progressing Towards Goal     Problem: Falls - Risk of  Goal: *Absence of Falls  Description: Document Renuka Blood Fall Risk and appropriate interventions in the flowsheet. Outcome: Progressing Towards Goal  Note: Fall Risk Interventions:  Mobility Interventions: Bed/chair exit alarm, Patient to call before getting OOB, Utilize walker, cane, or other assistive device, Utilize gait belt for transfers/ambulation    Mentation Interventions: Adequate sleep, hydration, pain control, Bed/chair exit alarm, Increase mobility, Room close to nurse's station    Medication Interventions: Bed/chair exit alarm, Patient to call before getting OOB, Teach patient to arise slowly    Elimination Interventions: Bed/chair exit alarm, Call light in reach, Patient to call for help with toileting needs    History of Falls Interventions: Bed/chair exit alarm, Room close to nurse's station, Utilize gait belt for transfer/ambulation         Problem: Patient Education: Go to Patient Education Activity  Goal: Patient/Family Education  Outcome: Progressing Towards Goal     Problem: Body Temperature -  Risk of, Imbalanced  Goal: *Absence of heat stress or hyperthermia signs and symptoms  Outcome: Progressing Towards Goal     Problem: Pressure Injury - Risk of  Goal: *Prevention of pressure injury  Description: Document Eligio Scale and appropriate interventions in the flowsheet.   Outcome: Progressing Towards Goal  Note: Pressure Injury Interventions:  Sensory Interventions: Assess changes in LOC, Keep linens dry and wrinkle-free, Maintain/enhance activity level         Activity Interventions: Increase time out of bed, PT/OT evaluation    Mobility Interventions: HOB 30 degrees or less, PT/OT evaluation    Nutrition Interventions: Document food/fluid/supplement intake, Offer support with meals,snacks and hydration    Friction and Shear Interventions: HOB 30 degrees or less, Lift sheet, Minimize layers                Problem: Patient Education: Go to Patient Education Activity  Goal: Patient/Family Education  Outcome: Progressing Towards Goal     Problem: Patient Education: Go to Patient Education Activity  Goal: Patient/Family Education  Outcome: Progressing Towards Goal     Problem: Patient Education: Go to Patient Education Activity  Goal: Patient/Family Education  Outcome: Progressing Towards Goal

## 2021-11-29 NOTE — PROGRESS NOTES
Problem: Self Care Deficits Care Plan (Adult)  Goal: *Acute Goals and Plan of Care (Insert Text)  Description:   FUNCTIONAL STATUS PRIOR TO ADMISSION: Patient was independent and active without use of DME. 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 Occupational Therapy Goals  Initiated 11/24/2021  1. Patient will perform stand pivot transfer to and from bedside commode  with minimal assistance/contact guard assist and maintain NWB left LE within 7 day(s). 2.  Patient will perform all aspects of toileting with moderate assistance  within 7 day(s). 3.  Patient will participate in upper extremity therapeutic exercise/activities with supervision/set-up for 10 minutes within 7 day(s). 4.  Patient will stand for functional task > or = 1 minute with bilateral UE support and maintain NWB left LE within 7 day(s). Outcome: Progressing Towards Goal   OCCUPATIONAL THERAPY TREATMENT  Patient: Belinda Puente (58 y.o. female)  Date: 11/29/2021  Diagnosis: Bimalleolar fracture of left ankle [S82.842A]   Bimalleolar fracture of left ankle  Procedure(s) (LRB):  LEFT ANKLE WASHOUT WITH EXTERNAL FIXATION (Left) 6 Days Post-Op  Precautions: NWB  Chart, occupational therapy assessment, plan of care, and goals were reviewed. ASSESSMENT  Patient continues with skilled OT services and is progressing towards goals. Pt transfers to [facility] assist x 1 maintaining NWB, and able to manage her own bladder hygiene. Reviewed UE exercises to perform. Current Level of Function Impacting Discharge (ADLs): min assist transfer to [facility], mod I bladder hygiene    Other factors to consider for discharge:          PLAN :  Patient continues to benefit from skilled intervention to address the above impairments.   Continue treatment per established plan of care to address goals. Recommend with staff: out of bed to chair for ADL's, there ex, there act, meals    Recommend next OT session: cont towards goals    Recommendation for discharge: (in order for the patient to meet his/her long term goals)  Therapy up to 5 days/week in SNF setting or an intensive home health therapy program    This discharge recommendation:  Has not yet been discussed the attending provider and/or case management    IF patient discharges home will need the following DME:        SUBJECTIVE:   Patient stated I was up a long time this morning.     OBJECTIVE DATA SUMMARY:   Cognitive/Behavioral Status:  Neurologic State: Alert  Orientation Level: Oriented X4  Cognition: Appropriate decision making; Appropriate safety awareness; Follows commands             Functional Mobility and Transfers for ADLs:  Bed Mobility:   Mod I    Transfers:  Sit to Stand: Contact guard assistance  Functional Transfers  Toilet Transfer : Minimum assistance  Adaptive Equipment: Bedside commode; Walker (comment)  Bed to Chair: Minimum assistance    Balance:  Sitting: Intact  Standing: Impaired;  With support  Standing - Static: Fair  Standing - Dynamic : Constant support    ADL Intervention:       Toileting  Toileting Assistance: Contact guard assistance  Bladder Hygiene: Contact guard assistance         Therapeutic Exercises:   Reviewed UE exercises to perform as able      Activity Tolerance:   Good    After treatment patient left in no apparent distress:   Supine in bed and Call bell within reach    COMMUNICATION/COLLABORATION:   The patients plan of care was discussed with: Occupational therapist.     LAURA Montilla  Time Calculation: 12 mins

## 2021-11-30 LAB
ERYTHROCYTE [DISTWIDTH] IN BLOOD BY AUTOMATED COUNT: 13.4 % (ref 11.5–14.5)
HCT VFR BLD AUTO: 39.1 % (ref 35–47)
HGB BLD-MCNC: 12.3 G/DL (ref 11.5–16)
MCH RBC QN AUTO: 29 PG (ref 26–34)
MCHC RBC AUTO-ENTMCNC: 31.5 G/DL (ref 30–36.5)
MCV RBC AUTO: 92.2 FL (ref 80–99)
NRBC # BLD: 0 K/UL (ref 0–0.01)
NRBC BLD-RTO: 0 PER 100 WBC
PLATELET # BLD AUTO: 312 K/UL (ref 150–400)
PMV BLD AUTO: 9.4 FL (ref 8.9–12.9)
RBC # BLD AUTO: 4.24 M/UL (ref 3.8–5.2)
WBC # BLD AUTO: 5.7 K/UL (ref 3.6–11)

## 2021-11-30 PROCEDURE — 74011250636 HC RX REV CODE- 250/636: Performed by: ORTHOPAEDIC SURGERY

## 2021-11-30 PROCEDURE — 74011250637 HC RX REV CODE- 250/637: Performed by: HOSPITALIST

## 2021-11-30 PROCEDURE — 97535 SELF CARE MNGMENT TRAINING: CPT

## 2021-11-30 PROCEDURE — 97116 GAIT TRAINING THERAPY: CPT

## 2021-11-30 PROCEDURE — 65270000029 HC RM PRIVATE

## 2021-11-30 PROCEDURE — 74011250637 HC RX REV CODE- 250/637: Performed by: PHYSICIAN ASSISTANT

## 2021-11-30 PROCEDURE — 97530 THERAPEUTIC ACTIVITIES: CPT

## 2021-11-30 PROCEDURE — 74011250637 HC RX REV CODE- 250/637: Performed by: INTERNAL MEDICINE

## 2021-11-30 PROCEDURE — 74011250637 HC RX REV CODE- 250/637: Performed by: ORTHOPAEDIC SURGERY

## 2021-11-30 PROCEDURE — 36415 COLL VENOUS BLD VENIPUNCTURE: CPT

## 2021-11-30 PROCEDURE — 85027 COMPLETE CBC AUTOMATED: CPT

## 2021-11-30 PROCEDURE — 97110 THERAPEUTIC EXERCISES: CPT

## 2021-11-30 RX ADMIN — Medication 1 TABLET: at 13:10

## 2021-11-30 RX ADMIN — ACETAMINOPHEN 650 MG: 325 TABLET ORAL at 01:37

## 2021-11-30 RX ADMIN — ACETAMINOPHEN 650 MG: 325 TABLET ORAL at 18:01

## 2021-11-30 RX ADMIN — PANTOPRAZOLE SODIUM 40 MG: 40 TABLET, DELAYED RELEASE ORAL at 06:30

## 2021-11-30 RX ADMIN — Medication 1 TABLET: at 08:33

## 2021-11-30 RX ADMIN — Medication 1 TABLET: at 17:10

## 2021-11-30 RX ADMIN — Medication 10 ML: at 13:13

## 2021-11-30 RX ADMIN — ACETAMINOPHEN 650 MG: 325 TABLET ORAL at 13:10

## 2021-11-30 RX ADMIN — ENOXAPARIN SODIUM 40 MG: 100 INJECTION SUBCUTANEOUS at 08:34

## 2021-11-30 RX ADMIN — ACETAMINOPHEN 650 MG: 325 TABLET ORAL at 06:05

## 2021-11-30 RX ADMIN — MIRTAZAPINE 7.5 MG: 15 TABLET, FILM COATED ORAL at 21:52

## 2021-11-30 RX ADMIN — Medication 5 ML: at 21:52

## 2021-11-30 RX ADMIN — Medication 10 ML: at 06:31

## 2021-11-30 NOTE — PROGRESS NOTES
[facility]    Medical Progress Note      NAME: Belinda Puente   :  1935  MRM:  [id number]    Date/Time of service 2021  11:08 AM          Assessment and Plan:     Bimalleolar L ankle fracture / Chronic bilateral low back pain without sciatica - Fx POA, due from mechanical fall that would not have damaged normal bone. Ortho consulted and S/p surgery 21. Appreciate PT/OT. SNF placement pending, but that was delayed so long that now she should stay to have ORIF on . Up until then, her discharge delayed due to lack of SNF bed. Pain control with PO oxycodone, ultram or prn IV morphine    Anemia - Somewhat low iron on serologies. DC on PO iron    Hypercholesterolemia - Not on meds,  and trig 119 on panel    Insomnia - Prn remeron     GERD - PPI      BP transiently elevated - Due to Pain. Not HTN      Constipation - resolved with miralax       Subjective:     Chief Complaint:  Ankle pain mild, awaiting surgery on Thursday    ROS:  (bold if positive, if negative)    Tolerating some PT  Tolerating Diet        Objective:     Last 24hrs VS reviewed since prior progress note.  Most recent are:    Visit Vitals  /72 (BP 1 Location: Right upper arm, BP Patient Position: At rest)   Pulse 69   Temp 97.8 °F (36.6 °C)   Resp 17   Ht 5' 2\" (1.575 m)   Wt 71.8 kg (158 lb 4.8 oz)   SpO2 95%   BMI 28.95 kg/m²     SpO2 Readings from Last 6 Encounters:   21 95%   10/12/21 95%   10/06/20 95%   09/15/20 98%   19 97%   19 100%            Intake/Output Summary (Last 24 hours) at 2021 1108  Last data filed at 2021 0606  Gross per 24 hour   Intake 1480 ml   Output --   Net 1480 ml        Physical Exam:    Gen:  Well-developed, well-nourished, in no acute distress  HEENT:  Pink conjunctivae, PERRL, hearing intact to voice, moist mucous membranes  Neck:  Supple, without masses, thyroid non-tender  Resp:  No accessory muscle use, clear breath sounds without wheezes rales or rhonchi  Card:  No murmurs, normal S1, S2 without thrills, bruits or peripheral edema  Abd:  Soft, non-tender, non-distended, normoactive bowel sounds are present, no mass  Lymph:  No cervical or inguinal adenopathy  Musc:  No cyanosis or clubbing  Skin:  No rashes or ulcers, skin turgor is good  Neuro:  Cranial nerves are grossly intact, no focal motor weakness, follows commands appropriately  Psych:  Good insight, oriented to person, place and time, alert    Telemetry reviewed:   normal sinus rhythm  __________________________________________________________________  Medications Reviewed: (see below)  Medications:     Current Facility-Administered Medications   Medication Dose Route Frequency    polyethylene glycol (MIRALAX) packet 17 g  17 g Oral BID    hydrALAZINE (APRESOLINE) 20 mg/mL injection 10 mg  10 mg IntraVENous Q6H PRN    sodium chloride (NS) flush 5-40 mL  5-40 mL IntraVENous Q8H    sodium chloride (NS) flush 5-40 mL  5-40 mL IntraVENous PRN    polyethylene glycol (MIRALAX) packet 17 g  17 g Oral DAILY PRN    senna (SENOKOT) tablet 8.6 mg  1 Tablet Oral DAILY PRN    promethazine (PHENERGAN) tablet 12.5 mg  12.5 mg Oral Q6H PRN    Or    ondansetron (ZOFRAN) injection 4 mg  4 mg IntraVENous Q6H PRN    morphine injection 2 mg  2 mg IntraVENous Q4H PRN    pantoprazole (PROTONIX) tablet 40 mg  40 mg Oral ACB    sodium chloride (NS) flush 5-40 mL  5-40 mL IntraVENous Q8H    sodium chloride (NS) flush 5-40 mL  5-40 mL IntraVENous PRN    naloxone (NARCAN) injection 0.4 mg  0.4 mg IntraVENous PRN    calcium-vitamin D (OS-OLGA LIDIA +D3) 500 mg-200 unit per tablet 1 Tablet  1 Tablet Oral TID WITH MEALS    senna-docusate (PERICOLACE) 8.6-50 mg per tablet 1 Tablet  1 Tablet Oral BID    bisacodyL (DULCOLAX) suppository 10 mg  10 mg Rectal DAILY PRN    enoxaparin (LOVENOX) injection 40 mg  40 mg SubCUTAneous DAILY    mirtazapine (REMERON) tablet 7.5 mg  7.5 mg Oral QHS    traMADoL (ULTRAM) tablet 50 mg  50 mg Oral Q6H PRN    oxyCODONE IR (ROXICODONE) tablet 5 mg  5 mg Oral Q4H PRN    acetaminophen (TYLENOL) tablet 650 mg  650 mg Oral Q6H        Lab Data Reviewed: (see below)  Lab Review:     Recent Labs     11/30/21  0403   WBC 5.7   HGB 12.3   HCT 39.1        No results for input(s): NA, K, CL, CO2, GLU, BUN, CREA, CA, MG, PHOS, ALB, TBIL, TBILI, ALT, INR, INREXT, INREXT in the last 72 hours. No lab exists for component: SGOT  Lab Results   Component Value Date/Time    Glucose (POC) 99 11/24/2021 04:28 PM    Glucose (POC) 88 11/24/2021 11:47 AM    Glucose (POC) 106 11/24/2021 06:37 AM    Glucose (POC) 125 (H) 11/23/2021 09:03 PM    Glucose (POC) 104 (H) 10/12/2021 01:14 PM    Glucose (POC) 136 (H) 10/06/2020 03:01 PM     No results for input(s): PH, PCO2, PO2, HCO3, FIO2 in the last 72 hours. No results for input(s): INR, INREXT, INREXT in the last 72 hours. All Micro Results     Procedure Component Value Units Date/Time    COVID-19 RAPID TEST [[id number]] Collected: 11/23/21 0546    Order Status: Completed Specimen: Nasopharyngeal Updated: 11/23/21 0622     Specimen source Nasopharyngeal        COVID-19 rapid test Not detected        Comment: Rapid Abbott ID Now       Rapid NAAT:  The specimen is NEGATIVE for SARS-CoV-2, the novel coronavirus associated with COVID-19. Negative results should be treated as presumptive and, if inconsistent with clinical signs and symptoms or necessary for patient management, should be tested with an alternative molecular assay. Negative results do not preclude SARS-CoV-2 infection and should not be used as the sole basis for patient management decisions. This test has been authorized by the FDA under an Emergency Use Authorization (EUA) for use by authorized laboratories.    Fact sheet for Healthcare Providers: kstattoo.com  Fact sheet for Patients: kstattoo.com Methodology: Isothermal Nucleic Acid Amplification               Other pertinent lab: none    Total time spent with patient: 30 Minutes I personally reviewed chart, notes, data and current medications in the medical record. I have personally examined and treated the patient at bedside during this period.                  Care Plan discussed with: Patient, Nursing Staff, Consultant/Specialist and >50% of time spent in counseling and coordination of care    Discussed:  Care Plan and D/C Planning    Prophylaxis:  H2B/PPI    Disposition:  SNF/LTC           ___________________________________________________    Attending Physician: Ezio Samayoa MD

## 2021-11-30 NOTE — PROGRESS NOTES
Problem: Mobility Impaired (Adult and Pediatric)  Goal: *Acute Goals and Plan of Care (Insert Text)  Description: FUNCTIONAL STATUS PRIOR TO ADMISSION: Patient was independent and active without use of DME.    HOME SUPPORT PRIOR TO ADMISSION: The patient lived with  but did not require assist. Pt's  has dementia and pt is the caregiver. Physical Therapy Goals  Initiated 11/24/2021  1. Patient will move from supine to sit and sit to supine , scoot up and down, and roll side to side in bed with modified independence within 7 day(s). 2.  Patient will transfer from bed to chair and chair to bed with supervision/set-up using the least restrictive device within 7 day(s). 3.  Patient will perform sit to stand with supervision/set-up within 7 day(s). 4.  Patient will ambulate with moderate assist for 5 feet with the least restrictive device within 7 day(s). Note:   PHYSICAL THERAPY TREATMENT  Patient: Milo High (79 y.o. female)  Date: 11/30/2021  Diagnosis: Bimalleolar fracture of left ankle [S82.842A]   Bimalleolar fracture of left ankle  Procedure(s) (LRB):  LEFT ANKLE WASHOUT WITH EXTERNAL FIXATION (Left) 7 Days Post-Op  Precautions: NWB  Chart, physical therapy assessment, plan of care and goals were reviewed. ASSESSMENT  Patient continues with skilled PT services. Pt siting up in chair with legs elevated on arrival of PT. Pt CGA sit to stand. Pt ambulated 6ft with RW CGA NWB on left. Pt moves slowly with external fixator on left LE. Pt left sitting. Pt progressing well with tranfers and limited ambulation. Continue goals. PLAN :  Patient continues to benefit from skilled intervention to address the above impairments. Continue treatment per established plan of care. to address goals.     Recommendation for discharge: (in order for the patient to meet his/her long term goals)  Therapy up to 5 days/week in SNF setting    This discharge recommendation:  Has been made in collaboration with the attending provider and/or case management    IF patient discharges home will need the following DME: rolling walker       SUBJECTIVE:       OBJECTIVE DATA SUMMARY:   Critical Behavior:  Neurologic State: Alert  Orientation Level: Oriented X4  Cognition: Follows commands  Safety/Judgement: Awareness of environment, Fall prevention, Home safety, Insight into deficits  Functional Mobility Training:    Transfers:  Sit to Stand: Contact guard assistance  Stand to Sit: Contact guard assistance                             Balance:  Sitting: Intact  Standing: With support  Standing - Static: Good  Ambulation/Gait Training:  Distance (ft): 6 Feet (ft)     Ambulation - Level of Assistance: Contact guard assistance                                  Activity Tolerance:   Good    After treatment patient left in no apparent distress:   Sitting in chair    COMMUNICATION/COLLABORATION:   The patients plan of care was discussed with: Physical therapist.     Jorge Warner PTA   Time Calculation: 23 mins

## 2021-11-30 NOTE — PROGRESS NOTES
Fall Risk Interventions:    Mobility Interventions: Bed/chair exit alarm         Mentation Interventions: Adequate sleep, hydration, pain control         Medication Interventions: Patient to call before getting OOB         Elimination Interventions: Bed/chair exit alarm, Call light in reach         History of Falls Interventions: Bed/chair exit alarm

## 2021-11-30 NOTE — PROGRESS NOTES
Problem: Self Care Deficits Care Plan (Adult)  Goal: *Acute Goals and Plan of Care (Insert Text)  Description:   FUNCTIONAL STATUS PRIOR TO ADMISSION: Patient was independent and active without use of DME. 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 Occupational Therapy Goals  Initiated 11/24/2021  1. Patient will perform stand pivot transfer to and from bedside commode  with minimal assistance/contact guard assist and maintain NWB left LE within 7 day(s). 2.  Patient will perform all aspects of toileting with moderate assistance  within 7 day(s). 3.  Patient will participate in upper extremity therapeutic exercise/activities with supervision/set-up for 10 minutes within 7 day(s). 4.  Patient will stand for functional task > or = 1 minute with bilateral UE support and maintain NWB left LE within 7 day(s). Outcome: Progressing Towards Goal   OCCUPATIONAL THERAPY TREATMENT  Patient: Karli Bar (5 y.o. female)  Date: 11/30/2021  Diagnosis: Bimalleolar fracture of left ankle [S82.842A]   Bimalleolar fracture of left ankle  Procedure(s) (LRB):  LEFT ANKLE WASHOUT WITH EXTERNAL FIXATION (Left) 7 Days Post-Op  Precautions: NWB  Chart, occupational therapy assessment, plan of care, and goals were reviewed. ASSESSMENT  Patient continues with skilled OT services and is progressing towards goals. Pt transfers with contact guard bed to chair. She is able to sponge bathe upper body and LE's with set-up as well as doff/don gown. She was issued yellow band for UE exercises. Pt left with L LE elevated in chair.     Current Level of Function Impacting Discharge (ADLs): Set-up to bathe/dress UB, grooming    Other factors to consider for discharge:          PLAN :  Patient continues to benefit from skilled intervention to address the above impairments. Continue treatment per established plan of care to address goals. Recommend with staff: out of bed for ADL's, there ex, there act, meals    Recommend next OT session: cont towards goals    Recommendation for discharge: (in order for the patient to meet his/her long term goals)  Therapy up to 5 days/week in SNF setting    This discharge recommendation:  Has not yet been discussed the attending provider and/or case management    IF patient discharges home will need the following DME:       SUBJECTIVE:   Patient stated It does feel good to be doing something.  and with use of yellow band    OBJECTIVE DATA SUMMARY:   Cognitive/Behavioral Status:  Neurologic State: Alert  Orientation Level: Oriented X4  Cognition: Follows commands             Functional Mobility and Transfers for ADLs:  Bed Mobility:       Transfers:  Sit to Stand: Contact guard assistance  Functional Transfers  Toilet Transfer : Contact guard assistance  Adaptive Equipment: Bedside commode; Walker (comment)       Balance:  Sitting: Intact  Standing: Impaired    ADL Intervention:       Grooming  Washing Face: Set-up    Upper Body Bathing  Bathing Assistance: Set-up  Position Performed: Seated edge of bed    Lower Body Bathing  Lower Body : Set-up  Position Performed: Seated edge of bed    Upper Body Dressing Assistance  Dressing Assistance: [id number] Gown: Set-up                   Therapeutic Exercises:     EXERCISE   Sets   Reps   Active Active Assist   Passive   Comments   Shoulder horizontal ab/add 1 10 [x]           []           []           Yellow band   Chest presses 1 10 [x]           []           []           Yellow band   Elbow flex/ext 1 10 [x]           []           []           Yellow band      []           []           []                 []           []           []                 []           []           []                 []           []           []                 []           []           [] []           []           []                 []           []           []                 []           []           []                   Activity Tolerance:   Good    After treatment patient left in no apparent distress:   Sitting in chair and Call bell within reach    COMMUNICATION/COLLABORATION:   The patients plan of care was discussed with: Physical therapy assistant, Occupational therapist, and Registered nurse.      LAURA Cho  Time Calculation: 27 mins

## 2021-11-30 NOTE — PROGRESS NOTES
11/30/2021  2:12 PM  Transition of care plan   RUR 7%  LOS 7 Days  1. CM following  2. Ortho following - will evaluate on Wednesday for possible ORIF on Thursday. 3. PT/OT following  4. CM spoke w/ pt's son Stu Fairchild, preferred facility is [facility] and pt is accepted, will need  PT/OT notes and insurance pre-auth   5. DC when stable to [facility]  5.  Transport at d/c TBD, likely TRINI Medina, [place]

## 2021-11-30 NOTE — PROGRESS NOTES
Orthopedic NP Progress Note  Post Op day: 7 Days Post-Op    November 30, 2021 10:52 AM     Luis Bowman    Attending Physician: Treatment Team: Attending Provider: Alisa Sarkar MD; Consulting Provider: Dinorah Cohn MD; Consulting Provider: DEANNA Pierce; Care Manager: Ruperto Bill; Utilization Review: Kira Yi RN; Care Manager: Jin Aldrich     Vital Signs:    Patient Vitals for the past 8 hrs:   BP Temp Pulse Resp SpO2   11/30/21 0800 115/72 97.8 °F (36.6 °C) 69 17 95 %   11/30/21 0753 131/66 97.8 °F (36.6 °C) 69 16 96 %   11/30/21 0310 117/68 98 °F (36.7 °C) 64 18 96 %          Intake/Output:  No intake/output data recorded. 11/28 1901 - 11/30 0700  In: 2920 [P.O.:1720]  Out: -     Pain Control:   Pain Assessment  Pain Scale 1: Numeric (0 - 10)  Pain Intensity 1: 0  Pain Onset 1: post op  Pain Location 1: Ankle  Pain Orientation 1: Left  Pain Description 1: Sore  Pain Intervention(s) 1: Medication (see MAR)    LAB:    Recent Labs     11/30/21  0403   HCT 39.1   HGB 12.3     Lab Results   Component Value Date/Time    Sodium 141 11/24/2021 02:34 AM    Potassium 4.3 11/24/2021 02:34 AM    Chloride 111 (H) 11/24/2021 02:34 AM    CO2 27 11/24/2021 02:34 AM    Glucose 110 (H) 11/24/2021 02:34 AM    BUN 14 11/24/2021 02:34 AM    Creatinine 0.93 11/24/2021 02:34 AM    Calcium 8.2 (L) 11/24/2021 02:34 AM       Subjective:  Luis Bowman is a 80 y.o. female s/p a  Procedure(s):  LEFT ANKLE WASHOUT WITH EXTERNAL FIXATION   Procedure(s):  LEFT ANKLE WASHOUT WITH EXTERNAL FIXATION. Tolerating diet. Pain is well managed        Objective: General: alert, cooperative, no distress. Neuro/Vascular: CNS Intact. Sensation stable. Brisk cap refill, 2+ pulses UE/LE  Musculoskeletal:  +ROM UE/LE with ex fix to LLE . Skin: Incision - clean, dry and intact. No significant erythema or swelling.     Dressing: clean, dry, and intact    [place] - n  Drain - n       PT/OT:   Gait: Gait  Ambulation - Level of Assistance: Minimal assistance  Distance (ft): 3 Feet (ft)  Assistive Device: Gait belt, Walker, rolling                   Assessment:    s/p Procedure(s):  LEFT ANKLE WASHOUT WITH EXTERNAL FIXATION    Principal Problem:    Bimalleolar fracture of left ankle (11/23/2021)    Active Problems:    Pure hypercholesterolemia (12/9/2016)      Gastroesophageal reflux disease without esophagitis (12/9/2016)      Chronic bilateral low back pain without sciatica (12/9/2016)      Subclinical hypothyroidism (6/21/2018)         Plan:   -  Continue PT/OT - NWB LLE, elevation at all times   -  Continue established methods of pain control  -  VTE Prophylaxes - TEDS &/or SCDs with Lovenox 40mg daily   -  IV ancef for 48 hours completed  -  Plan for ORIF Thursday with Dr. Tonia Domínguez  -  Pin care and dressing change completed by me during rounds, avoiding xeroform to pin sites due to maceration, will follow up in AM       Signed By: Kentrell Moreira NP    Orthopedic Nurse Practitioner

## 2021-11-30 NOTE — PROGRESS NOTES
Problem: Falls - Risk of  Goal: *Absence of Falls  Description: Document Rome Brooks Fall Risk and appropriate interventions in the flowsheet.   Outcome: Progressing Towards Goal  Note: Fall Risk Interventions:  Mobility Interventions: Bed/chair exit alarm    Mentation Interventions: Adequate sleep, hydration, pain control, Bed/chair exit alarm    Medication Interventions: Bed/chair exit alarm, Evaluate medications/consider consulting pharmacy    Elimination Interventions: Bed/chair exit alarm    History of Falls Interventions: Bed/chair exit alarm, Consult care management for discharge planning    Problem: Patient Education: Go to Patient Education Activity  Goal: Patient/Family Education  Outcome: Progressing Towards Goal

## 2021-11-30 NOTE — INTERDISCIPLINARY ROUNDS
Rounded on patient for Leader Rounding on the unit. Patient is alert and oriented x4 and in no acute distress at this time. Opportunities for questions and comments addressed at this time.    Radha Rojas PT,DPT,NCS

## 2021-12-01 ENCOUNTER — ANESTHESIA EVENT (OUTPATIENT)
Dept: SURGERY | Age: 86
DRG: 494 | End: 2021-12-01
Payer: MEDICARE

## 2021-12-01 LAB
ALBUMIN SERPL-MCNC: 2.8 G/DL (ref 3.5–5)
ANION GAP SERPL CALC-SCNC: 4 MMOL/L (ref 5–15)
BASOPHILS # BLD: 0.1 K/UL (ref 0–0.1)
BASOPHILS NFR BLD: 1 % (ref 0–1)
BUN SERPL-MCNC: 13 MG/DL (ref 6–20)
BUN/CREAT SERPL: 18 (ref 12–20)
CALCIUM SERPL-MCNC: 8.8 MG/DL (ref 8.5–10.1)
CHLORIDE SERPL-SCNC: 106 MMOL/L (ref 97–108)
CO2 SERPL-SCNC: 28 MMOL/L (ref 21–32)
CREAT SERPL-MCNC: 0.74 MG/DL (ref 0.55–1.02)
DIFFERENTIAL METHOD BLD: ABNORMAL
EOSINOPHIL # BLD: 0.2 K/UL (ref 0–0.4)
EOSINOPHIL NFR BLD: 3 % (ref 0–7)
ERYTHROCYTE [DISTWIDTH] IN BLOOD BY AUTOMATED COUNT: 13.2 % (ref 11.5–14.5)
GLUCOSE SERPL-MCNC: 96 MG/DL (ref 65–100)
HCT VFR BLD AUTO: 38.5 % (ref 35–47)
HGB BLD-MCNC: 12.3 G/DL (ref 11.5–16)
IMM GRANULOCYTES # BLD AUTO: 0 K/UL (ref 0–0.04)
IMM GRANULOCYTES NFR BLD AUTO: 1 % (ref 0–0.5)
LYMPHOCYTES # BLD: 1.4 K/UL (ref 0.8–3.5)
LYMPHOCYTES NFR BLD: 22 % (ref 12–49)
MAGNESIUM SERPL-MCNC: 2.4 MG/DL (ref 1.6–2.4)
MCH RBC QN AUTO: 29.4 PG (ref 26–34)
MCHC RBC AUTO-ENTMCNC: 31.9 G/DL (ref 30–36.5)
MCV RBC AUTO: 91.9 FL (ref 80–99)
MONOCYTES # BLD: 0.6 K/UL (ref 0–1)
MONOCYTES NFR BLD: 10 % (ref 5–13)
NEUTS SEG # BLD: 4 K/UL (ref 1.8–8)
NEUTS SEG NFR BLD: 63 % (ref 32–75)
NRBC # BLD: 0 K/UL (ref 0–0.01)
NRBC BLD-RTO: 0 PER 100 WBC
PHOSPHATE SERPL-MCNC: 3.4 MG/DL (ref 2.6–4.7)
PLATELET # BLD AUTO: 330 K/UL (ref 150–400)
PMV BLD AUTO: 8.9 FL (ref 8.9–12.9)
POTASSIUM SERPL-SCNC: 3.8 MMOL/L (ref 3.5–5.1)
RBC # BLD AUTO: 4.19 M/UL (ref 3.8–5.2)
SODIUM SERPL-SCNC: 138 MMOL/L (ref 136–145)
T4 FREE SERPL-MCNC: 1.1 NG/DL (ref 0.8–1.5)
TSH SERPL DL<=0.05 MIU/L-ACNC: 4.62 UIU/ML (ref 0.36–3.74)
WBC # BLD AUTO: 6.3 K/UL (ref 3.6–11)

## 2021-12-01 PROCEDURE — 84443 ASSAY THYROID STIM HORMONE: CPT

## 2021-12-01 PROCEDURE — 74011250637 HC RX REV CODE- 250/637: Performed by: ORTHOPAEDIC SURGERY

## 2021-12-01 PROCEDURE — 97116 GAIT TRAINING THERAPY: CPT

## 2021-12-01 PROCEDURE — 85025 COMPLETE CBC W/AUTO DIFF WBC: CPT

## 2021-12-01 PROCEDURE — 74011250636 HC RX REV CODE- 250/636: Performed by: PHYSICIAN ASSISTANT

## 2021-12-01 PROCEDURE — 36415 COLL VENOUS BLD VENIPUNCTURE: CPT

## 2021-12-01 PROCEDURE — 97530 THERAPEUTIC ACTIVITIES: CPT

## 2021-12-01 PROCEDURE — 80069 RENAL FUNCTION PANEL: CPT

## 2021-12-01 PROCEDURE — 74011250637 HC RX REV CODE- 250/637: Performed by: PHYSICIAN ASSISTANT

## 2021-12-01 PROCEDURE — 84439 ASSAY OF FREE THYROXINE: CPT

## 2021-12-01 PROCEDURE — 83735 ASSAY OF MAGNESIUM: CPT

## 2021-12-01 PROCEDURE — 74011250637 HC RX REV CODE- 250/637: Performed by: INTERNAL MEDICINE

## 2021-12-01 PROCEDURE — 74011250637 HC RX REV CODE- 250/637: Performed by: HOSPITALIST

## 2021-12-01 PROCEDURE — 65270000029 HC RM PRIVATE

## 2021-12-01 RX ORDER — ENOXAPARIN SODIUM 100 MG/ML
40 INJECTION SUBCUTANEOUS DAILY
Status: COMPLETED | OUTPATIENT
Start: 2021-12-01 | End: 2021-12-01

## 2021-12-01 RX ADMIN — ACETAMINOPHEN 650 MG: 325 TABLET ORAL at 06:03

## 2021-12-01 RX ADMIN — PANTOPRAZOLE SODIUM 40 MG: 40 TABLET, DELAYED RELEASE ORAL at 06:30

## 2021-12-01 RX ADMIN — ACETAMINOPHEN 650 MG: 325 TABLET ORAL at 15:09

## 2021-12-01 RX ADMIN — Medication 10 ML: at 15:09

## 2021-12-01 RX ADMIN — Medication 10 ML: at 06:04

## 2021-12-01 RX ADMIN — TRAMADOL HYDROCHLORIDE 50 MG: 50 TABLET, FILM COATED ORAL at 20:08

## 2021-12-01 RX ADMIN — MIRTAZAPINE 7.5 MG: 15 TABLET, FILM COATED ORAL at 21:43

## 2021-12-01 RX ADMIN — Medication 1 TABLET: at 11:09

## 2021-12-01 RX ADMIN — ENOXAPARIN SODIUM 40 MG: 100 INJECTION SUBCUTANEOUS at 09:13

## 2021-12-01 RX ADMIN — Medication 1 TABLET: at 17:23

## 2021-12-01 RX ADMIN — Medication 10 ML: at 06:03

## 2021-12-01 RX ADMIN — Medication 1 TABLET: at 09:13

## 2021-12-01 RX ADMIN — ACETAMINOPHEN 650 MG: 325 TABLET ORAL at 21:43

## 2021-12-01 RX ADMIN — Medication 10 ML: at 21:44

## 2021-12-01 NOTE — PROGRESS NOTES
Problem: Falls - Risk of  Goal: *Absence of Falls  Description: Document Madeline Ayers Fall Risk and appropriate interventions in the flowsheet.   Outcome: Progressing Towards Goal  Note: Fall Risk Interventions:  Mobility Interventions: Patient to call before getting OOB    Mentation Interventions: Adequate sleep, hydration, pain control, Bed/chair exit alarm    Medication Interventions: Bed/chair exit alarm    Elimination Interventions: Patient to call for help with toileting needs    History of Falls Interventions: Bed/chair exit alarm    Problem: Patient Education: Go to Patient Education Activity  Goal: Patient/Family Education  Outcome: Progressing Towards Goal

## 2021-12-01 NOTE — PROGRESS NOTES
Problem: Mobility Impaired (Adult and Pediatric)  Goal: *Acute Goals and Plan of Care (Insert Text)  Description: Physical Therapy Goals  Revised 12/1/2021  1. Patient will move from supine to sit and sit to supine  in bed with modified independence within 7 day(s). 2.  Patient will transfer from bed to chair and chair to bed with minimal assistance/contact guard assist using the least restrictive device within 7 day(s). 3.  Patient will perform sit to stand with supervision/set-up within 7 day(s). 4.  Patient will ambulate with minimal assistance/contact guard assist for 25 feet with the least restrictive device within 7 day(s). FUNCTIONAL STATUS PRIOR TO ADMISSION: Patient was independent and active without use of DME.    HOME SUPPORT PRIOR TO ADMISSION: The patient lived with  but did not require assist. Pt's  has dementia and pt is the caregiver. Physical Therapy Goals  Initiated 11/24/2021  1. Patient will move from supine to sit and sit to supine , scoot up and down, and roll side to side in bed with modified independence within 7 day(s). 2.  Patient will transfer from bed to chair and chair to bed with supervision/set-up using the least restrictive device within 7 day(s). 3.  Patient will perform sit to stand with supervision/set-up within 7 day(s). 4.  Patient will ambulate with moderate assist for 5 feet with the least restrictive device within 7 day(s). Outcome: Progressing Towards Goal   PHYSICAL THERAPY TREATMENT: WEEKLY REASSESSMENT  Patient: Carina Roberts (43 y.o. female)  Date: 12/1/2021  Primary Diagnosis: Bimalleolar fracture of left ankle [S82.842A]  Procedure(s) (LRB):  LEFT ANKLE WASHOUT WITH EXTERNAL FIXATION (Left) 8 Days Post-Op   Precautions:   NWB LLE      ASSESSMENT  Patient continues with skilled PT services and is progressing towards goals. Pt received sitting on EOB brushing her teeth. Pt is very A&Ox4.   She reports active lifestyle playing [employer] ball and caring for her spouse with dementia. Spouse is now in SNF s/p her fall and ankle fx. Pt is now po day# 8 from bimalleSarah with ext fixator in place. Surgical plan tmw for ORIF. Pt needing verbal cues for proper hand placement with sit <> stand with RW. Ht of walker lowered to allow better UE use. Sit to stand with Min to CGA. Pt maintaining NWB on LLE very well. Performed gait of 10' in 5x2 increments. Lower youth walker to be provided for best UE use. Pt family to bring in supportive shoe for R foot to improve stability with gait. Placed in chair with BLE elevated and in NAD. Excellent rehab/IPR candidate. Patient's progression toward goals since last assessment: per above; improved Tinetti Bal/Gait score    Current Level of Function Impacting Discharge (mobility/balance): Min A/good to fair    Functional Outcome Measure: The patient scored 13/28 on the Tinetti outcome measure    Other factors to consider for discharge: per above         PLAN :  Goals have been updated based on progression since last assessment. Patient continues to benefit from skilled intervention to address the above impairments. Recommendations and Planned Interventions: bed mobility training, transfer training, gait training, therapeutic exercises, neuromuscular re-education, edema management/control, patient and family training/education, and therapeutic activities      Frequency/Duration: Patient will be followed by physical therapy:  5 times a week to address goals. Recommendation for discharge: (in order for the patient to meet his/her long term goals)  Therapy 3 hours per day 5-7 days per week or SNF 5xwk    This discharge recommendation:  Has been made in collaboration with the attending provider and/or case management    IF patient discharges home will need the following DME: to be determined (TBD)         SUBJECTIVE:   Patient stated I am doing ok.     OBJECTIVE DATA SUMMARY:   HISTORY: Past Medical History:   Diagnosis Date    Arthritis     Gastrointestinal disorder     gerd    GERD (gastroesophageal reflux disease)     Ill-defined condition     cholesterol     Past Surgical History:   Procedure Laterality Date    COLONOSCOPY N/A 1/4/2019    Dr. Hernando Chaparro, no repeat recommended    HX BREAST BIOPSY  10-15 yrs ago    neg path, unsure which breast    HX COLONOSCOPY  01/01/2012    approx date, normal    HX ENDOSCOPY      no ulcers    HX ORTHOPAEDIC      right shoulder    HX WISDOM TEETH EXTRACTION         Personal factors and/or comorbidities impacting plan of care: per above and below    Home Situation  Home Environment: Private residence  # Steps to Enter: 4  Rails to Enter: Yes  Hand Rails : Bilateral (wide)  One/Two Story Residence: Other (Comment) (split level)  Living Alone: No  Support Systems: Spouse/Significant Other ( has dementia)  Patient Expects to be Discharged to[de-identified] Skilled nursing facility  Current DME Used/Available at Home: John Barron, [place], [address] chair, Grab bars  Tub or Shower Type: Shower    EXAMINATION/PRESENTATION/DECISION MAKING:   Critical Behavior:  Neurologic State: Alert  Orientation Level: Oriented X4  Cognition: Follows commands  Safety/Judgement: Awareness of environment, Fall prevention, Home safety, Insight into deficits  Hearing: Auditory  Auditory Impairment: None  Skin:  IV site; ext fixator distal LLE  Edema: LLE distal  Range Of Motion:      WFL BUE and RLE; L ankle in ext fixator                    Strength:     Generally decreased; functional                    Tone & Sensation:    WNL; intact                              Coordination:    WNL  Vision:      Functional Mobility:  Bed Mobility:  Rolling: Stand-by assistance  Supine to Sit: Stand-by assistance     Scooting: Modified independent  Transfers:  Sit to Stand: Minimum assistance; Contact guard assistance  Stand to Sit: Minimum assistance; Contact guard assistance (verbal cues for hand placement) Balance:   Sitting: Intact  Standing: Impaired  Standing - Static: Constant support; Good  Standing - Dynamic : Fair  Ambulation/Gait Training:  Distance (ft): 10 Feet (ft) (2x5)  Assistive Device: Walker, rolling; Gait belt  Ambulation - Level of Assistance: Minimal assistance        Gait Abnormalities: Antalgic; Decreased step clearance     Left Side Weight Bearing: Non-weight bearing (ext fixator)                            Functional Measure:  Tinetti test:    Sitting Balance: 1  Arises: 1  Attempts to Rise: 2  Immediate Standing Balance: 1  Standing Balance: 1  Nudged: 0  Eyes Closed: 0  Turn 360 Degrees - Continuous/Discontinuous: 0  Turn 360 Degrees - Steady/Unsteady: 0  Sitting Down: 2  Balance Score: 8 Balance total score  Indication of Gait: 1  R Step Length/Height: 1  L Step Length/Height: 0  R Foot Clearance: 1  L Foot Clearance: 0  Step Symmetry: 0  Step Continuity: 0  Path: 1  Trunk: 1  Walking Time: 0  Gait Score: 5 Gait total score  Total Score: 13/28 Overall total score         Tinetti Tool Score Risk of Falls  <19 = High Fall Risk  19-24 = Moderate Fall Risk  25-28 = Low Fall Risk  Tinetti ME. Performance-Oriented Assessment of Mobility Problems in Elderly Patients. Malcolm 66; [id number]. (Scoring Description: PT Bulletin Feb. 10, 1993)    Older adults: Maryam Beverly et al, 2009; n = 1000 [facility] elderly evaluated with ABC, KERRY, ADL, and IADL)  · Mean KERRY score for males aged 69-68 years = 26.21(3.40)  · Mean KERRY score for females age 69-68 years = 25.16(4.30)  · Mean KERRY score for males over 80 years = 23.29(6.02)  · Mean KERRY score for females over 80 years = 17.20(8.32)          Pain Rating:  Well managed    Activity Tolerance:   Fair    After treatment patient left in no apparent distress:   Sitting in chair, Heels elevated for pressure relief, and Call bell within reach    COMMUNICATION/EDUCATION:   The patients plan of care was discussed with: Registered nurse.      Fall prevention education was provided and the patient/caregiver indicated understanding., Patient/family have participated as able in goal setting and plan of care. , and Patient/family agree to work toward stated goals and plan of care.     Thank you for this referral.  Kannan Egan, PT   Time Calculation: 24 mins

## 2021-12-01 NOTE — PROGRESS NOTES
[address] [place] [address]  [phone]      Medical Progress Note      NAME: Etienne More   :  1935  MRM:  [id number]    Date/Time: 2021        Assessment / Plan:     79 yo F w/ hx of GERD admitted for L open ankle fx s/p closed reduction . Open bimalleolar L ankle fracture:  Fx POA, due from mechanical GLF fall. S/p closed reduction on , and now planning ORIF tomorrow (). PT/OT, DVT ppx, DC planning. Pain control on PO and IV opioid analgesics PRN     Anemia: low iron, normal ferritin. Hg normalized post-op    Hypercholesterolemia: Not on meds,  and trig 134 and HDL 58 on panel. Agree no statin is needed     Insomnia: PRN Remeron     GERD: cont PPI     Constipation: resolved with Miralax      Total time spent: 35 minutes  Time spent in the care of this patient including reviewing records, discussing with nursing and/or other providers on the treatment team, obtaining history and examining the patient, and discussing treatment plans. Care Plan discussed with: Patient, Nursing Staff and >50% of time spent in counseling and coordination of care    Discussed:  Care Plan and D/C Planning    Prophylaxis:  Lovenox to resume post-op    Disposition:  SNF/LTC         Subjective:     Chief Complaint:  Follow up ankle fx    Chart/notes/labs/studies reviewed, patient examined. Feels fine. Ankle pain controlled          Objective:       Vitals:        Last 24hrs VS reviewed since prior progress note.  Most recent are:    Visit Vitals  /62 (BP 1 Location: Right upper arm, BP Patient Position: At rest)   Pulse 85   Temp 97.6 °F (36.4 °C)   Resp 18   Ht 5' 2\" (1.575 m)   Wt 65.3 kg (144 lb)   SpO2 97%   BMI 26.34 kg/m²     SpO2 Readings from Last 6 Encounters:   21 97%   10/12/21 95%   10/06/20 95%   09/15/20 98%   19 97%   19 100%            Intake/Output Summary (Last 24 hours) at 12/1/2021 1829  Last data filed at 12/1/2021 8587  Gross per 24 hour   Intake 740 ml   Output --   Net 740 ml          Exam:     Physical Exam:    Gen:  Well-developed, well-nourished, in no acute distress. Pleasant   HEENT:  Atraumatic, normocephalic. Sclerae nonicteric, hearing intact to voice  Neck:  Supple, without apparent masses. Resp:  No accessory muscle use, CTAB without wheezes, rales, or rhonchi  Card: RRR, without m/r/g. Abd:  +bowel sounds, soft, NTTP, nondistended. No HSM  Neuro: Face symmetric, speech fluent, follows commands appropriately, no focal weakness or numbness  Psych:  Alert, oriented x 3.  Good insight     Medications Reviewed: (see below)    Lab Data Reviewed: (see below)    ______________________________________________________________________    Medications:     Current Facility-Administered Medications   Medication Dose Route Frequency    polyethylene glycol (MIRALAX) packet 17 g  17 g Oral BID    hydrALAZINE (APRESOLINE) 20 mg/mL injection 10 mg  10 mg IntraVENous Q6H PRN    sodium chloride (NS) flush 5-40 mL  5-40 mL IntraVENous Q8H    sodium chloride (NS) flush 5-40 mL  5-40 mL IntraVENous PRN    polyethylene glycol (MIRALAX) packet 17 g  17 g Oral DAILY PRN    senna (SENOKOT) tablet 8.6 mg  1 Tablet Oral DAILY PRN    promethazine (PHENERGAN) tablet 12.5 mg  12.5 mg Oral Q6H PRN    Or    ondansetron (ZOFRAN) injection 4 mg  4 mg IntraVENous Q6H PRN    morphine injection 2 mg  2 mg IntraVENous Q4H PRN    pantoprazole (PROTONIX) tablet 40 mg  40 mg Oral ACB    sodium chloride (NS) flush 5-40 mL  5-40 mL IntraVENous Q8H    sodium chloride (NS) flush 5-40 mL  5-40 mL IntraVENous PRN    naloxone (NARCAN) injection 0.4 mg  0.4 mg IntraVENous PRN    calcium-vitamin D (OS-OLGA LIDIA +D3) 500 mg-200 unit per tablet 1 Tablet  1 Tablet Oral TID WITH MEALS    senna-docusate (PERICOLACE) 8.6-50 mg per tablet 1 Tablet  1 Tablet Oral BID    bisacodyL (DULCOLAX) suppository 10 mg  10 mg Rectal DAILY PRN    mirtazapine (REMERON) tablet 7.5 mg  7.5 mg Oral QHS    traMADoL (ULTRAM) tablet 50 mg  50 mg Oral Q6H PRN    oxyCODONE IR (ROXICODONE) tablet 5 mg  5 mg Oral Q4H PRN    acetaminophen (TYLENOL) tablet 650 mg  650 mg Oral Q6H            Lab Review:     Recent Labs     12/01/21  0613 11/30/21  0403   WBC 6.3 5.7   HGB 12.3 12.3   HCT 38.5 39.1    312     Recent Labs     12/01/21  0613      K 3.8      CO2 28   GLU 96   BUN 13   CREA 0.74   CA 8.8   MG 2.4   PHOS 3.4   ALB 2.8*     No components found for: GLPOC  No results for input(s): PH, PCO2, PO2, HCO3, FIO2 in the last 72 hours. No results for input(s): INR, INREXT in the last 72 hours.   No results found for: SDES  No results found for: CULT           ___________________________________________________    Attending Physician: Bina Singer MD

## 2021-12-01 NOTE — PROGRESS NOTES
Chart reviewed in prep for OT session. Patient unavailable (toileting) at time of attempt. Patient has surgery planned tomorrow. Will follow up as able/appropriate.     Maggie Gonzalez, OTR/L

## 2021-12-01 NOTE — PROGRESS NOTES
12/1/2021  1:43 PM  Transition of care plan   RUR 5%  LOS 8 Days  1. CM following  2. Ortho following plan for ORIF ankle Thursday. 12/2  3. PT/OT following  4.pt accepted to  [facility],  will need  post-op PT/OT notes and insurance pre-auth   5. Order  for DME/WC completed,  all necessary discharging DME will be provided by the SNF   5. DC when stable to [facility]  5. Transport stretcher/ [facility] at DC   6.  CM will continue to follow and assist w/ DC needs  KATIANA Eldridge

## 2021-12-01 NOTE — PROGRESS NOTES
Fall Risk Interventions:    Mobility Interventions: Bed/chair exit alarm         Mentation Interventions: Adequate sleep, hydration, pain control         Medication Interventions: Bed/chair exit alarm         Elimination Interventions: Call light in reach         History of Falls Interventions: Bed/chair exit alarm

## 2021-12-01 NOTE — PROGRESS NOTES
ORTHO PROGRESS NOTE      SUBJECTIVE:  Cleopatra Beltran denies pain. OBJECTIVE:  Patient Vitals for the past 24 hrs:   Temp Pulse BP   12/01/21 0827 97.7 °F (36.5 °C) 65 123/72   12/01/21 0235 97.9 °F (36.6 °C) 80 129/67   11/30/21 2248 97.5 °F (36.4 °C) 75 117/60   11/30/21 1927 97.5 °F (36.4 °C) 76 124/72   11/30/21 1500 98.3 °F (36.8 °C) 81 112/68   11/30/21 1200 97.6 °F (36.4 °C) 78 122/62       Alert, no distress. Lying in bed. Family not present (but on speaker phone). Respirations unlabored. Dressing CDI. No active oozing/bleeding from pins/laceration. No erythema. Single dried blister medial ankle well above area of potential surgical incision. Laceration remains approximated. Good skin wrinkles foot and lower leg except some mild edema ankle. SILT bilat foot. Calves non-tender. Moves right ankle and left toes OK. Recent Labs     12/01/21  0613   HGB 12.3   HCT 38.5      BUN 13   CREA 0.74   GFRAA >60   GFRNA >60       PT/OT:   Gait:  Gait  Gait Abnormalities: Antalgic, Decreased step clearance  Ambulation - Level of Assistance: Minimal assistance  Distance (ft): 10 Feet (ft) (2x5)  Assistive Device: Walker, rolling, Gait belt                 ASSESSMENT:  Procedure: Procedure(s):  LEFT ANKLE WASHOUT WITH EXTERNAL FIXATION  Post Op day: 8 Days Post-Op    PLAN:  To OR for ORIF tomorrow. Case posted late afternoon. New dressing applied by me this am.  PT/OT: NWB LLE. May need wheelchair with elevated legrest for distances. Expect NWB min 6 weeks beyond ORIF. Analgesics: Tylenol. Prn Tramadol  DVT proph: Lovenox held after dose this am.  Resume 12/3 if she remains inpatient. Likely ASA as outpatient. Disp planning. Patient lives alone so anticipate SNF post-op. Should be ready 12/3.     DEANNA Inman  Orthopedic Trauma Service  [facility]

## 2021-12-02 ENCOUNTER — APPOINTMENT (OUTPATIENT)
Dept: GENERAL RADIOLOGY | Age: 86
DRG: 494 | End: 2021-12-02
Attending: INTERNAL MEDICINE
Payer: MEDICARE

## 2021-12-02 ENCOUNTER — ANESTHESIA (OUTPATIENT)
Dept: SURGERY | Age: 86
DRG: 494 | End: 2021-12-02
Payer: MEDICARE

## 2021-12-02 PROCEDURE — 74011250636 HC RX REV CODE- 250/636: Performed by: ORTHOPAEDIC SURGERY

## 2021-12-02 PROCEDURE — 74011000250 HC RX REV CODE- 250: Performed by: NURSE ANESTHETIST, CERTIFIED REGISTERED

## 2021-12-02 PROCEDURE — 0QSK04Z REPOSITION LEFT FIBULA WITH INTERNAL FIXATION DEVICE, OPEN APPROACH: ICD-10-PCS | Performed by: ORTHOPAEDIC SURGERY

## 2021-12-02 PROCEDURE — 76060000033 HC ANESTHESIA 1 TO 1.5 HR: Performed by: ORTHOPAEDIC SURGERY

## 2021-12-02 PROCEDURE — 97116 GAIT TRAINING THERAPY: CPT

## 2021-12-02 PROCEDURE — 77030002916 HC SUT ETHLN J&J -A: Performed by: ORTHOPAEDIC SURGERY

## 2021-12-02 PROCEDURE — 74011250637 HC RX REV CODE- 250/637: Performed by: HOSPITALIST

## 2021-12-02 PROCEDURE — 76210000016 HC OR PH I REC 1 TO 1.5 HR: Performed by: ORTHOPAEDIC SURGERY

## 2021-12-02 PROCEDURE — 74011250637 HC RX REV CODE- 250/637: Performed by: PHYSICIAN ASSISTANT

## 2021-12-02 PROCEDURE — 77030020143 HC AIRWY LARYN INTUB CGAS -A: Performed by: ANESTHESIOLOGY

## 2021-12-02 PROCEDURE — 74011000250 HC RX REV CODE- 250: Performed by: ORTHOPAEDIC SURGERY

## 2021-12-02 PROCEDURE — 74011250636 HC RX REV CODE- 250/636: Performed by: NURSE ANESTHETIST, CERTIFIED REGISTERED

## 2021-12-02 PROCEDURE — 94760 N-INVAS EAR/PLS OXIMETRY 1: CPT

## 2021-12-02 PROCEDURE — 77030002933 HC SUT MCRYL J&J -A: Performed by: ORTHOPAEDIC SURGERY

## 2021-12-02 PROCEDURE — 97110 THERAPEUTIC EXERCISES: CPT | Performed by: OCCUPATIONAL THERAPIST

## 2021-12-02 PROCEDURE — 77030032758 HC BIT DRL DISP STRY -D: Performed by: ORTHOPAEDIC SURGERY

## 2021-12-02 PROCEDURE — 0QSH35Z REPOSITION LEFT TIBIA WITH EXTERNAL FIXATION DEVICE, PERCUTANEOUS APPROACH: ICD-10-PCS | Performed by: ORTHOPAEDIC SURGERY

## 2021-12-02 PROCEDURE — 74011250636 HC RX REV CODE- 250/636: Performed by: INTERNAL MEDICINE

## 2021-12-02 PROCEDURE — 74011000250 HC RX REV CODE- 250: Performed by: ANESTHESIOLOGY

## 2021-12-02 PROCEDURE — 74011250636 HC RX REV CODE- 250/636: Performed by: ANESTHESIOLOGY

## 2021-12-02 PROCEDURE — 74011250637 HC RX REV CODE- 250/637: Performed by: INTERNAL MEDICINE

## 2021-12-02 PROCEDURE — C1769 GUIDE WIRE: HCPCS | Performed by: ORTHOPAEDIC SURGERY

## 2021-12-02 PROCEDURE — 77030031139 HC SUT VCRL2 J&J -A: Performed by: ORTHOPAEDIC SURGERY

## 2021-12-02 PROCEDURE — 76010000149 HC OR TIME 1 TO 1.5 HR: Performed by: ORTHOPAEDIC SURGERY

## 2021-12-02 PROCEDURE — C1713 ANCHOR/SCREW BN/BN,TIS/BN: HCPCS | Performed by: ORTHOPAEDIC SURGERY

## 2021-12-02 PROCEDURE — 77030003899 HC BIT DRL TWST STRY -D: Performed by: ORTHOPAEDIC SURGERY

## 2021-12-02 PROCEDURE — 65270000029 HC RM PRIVATE

## 2021-12-02 PROCEDURE — 2709999900 HC NON-CHARGEABLE SUPPLY: Performed by: ORTHOPAEDIC SURGERY

## 2021-12-02 DEVICE — BONE SCREW
Type: IMPLANTABLE DEVICE | Site: ANKLE | Status: FUNCTIONAL
Brand: VARIAX

## 2021-12-02 DEVICE — CANNULATED SCREW
Type: IMPLANTABLE DEVICE | Site: ANKLE | Status: FUNCTIONAL
Brand: ASNIS

## 2021-12-02 DEVICE — LOCKING SCREW
Type: IMPLANTABLE DEVICE | Site: ANKLE | Status: FUNCTIONAL
Brand: VARIAX

## 2021-12-02 DEVICE — DISTAL LATERAL FIBULA PLATE, 5 HOLE
Type: IMPLANTABLE DEVICE | Site: ANKLE | Status: FUNCTIONAL
Brand: VARIAX

## 2021-12-02 RX ORDER — HYDROMORPHONE HYDROCHLORIDE 1 MG/ML
.25-1 INJECTION, SOLUTION INTRAMUSCULAR; INTRAVENOUS; SUBCUTANEOUS
Status: DISCONTINUED | OUTPATIENT
Start: 2021-12-02 | End: 2021-12-02 | Stop reason: HOSPADM

## 2021-12-02 RX ORDER — ENOXAPARIN SODIUM 100 MG/ML
40 INJECTION SUBCUTANEOUS DAILY
Status: DISCONTINUED | OUTPATIENT
Start: 2021-12-03 | End: 2021-12-03 | Stop reason: HOSPADM

## 2021-12-02 RX ORDER — PROPOFOL 10 MG/ML
INJECTION, EMULSION INTRAVENOUS AS NEEDED
Status: DISCONTINUED | OUTPATIENT
Start: 2021-12-02 | End: 2021-12-02 | Stop reason: HOSPADM

## 2021-12-02 RX ORDER — FENTANYL CITRATE 50 UG/ML
INJECTION, SOLUTION INTRAMUSCULAR; INTRAVENOUS
Status: SHIPPED | OUTPATIENT
Start: 2021-12-02 | End: 2021-12-02

## 2021-12-02 RX ORDER — ROPIVACAINE HYDROCHLORIDE 5 MG/ML
INJECTION, SOLUTION EPIDURAL; INFILTRATION; PERINEURAL
Status: SHIPPED | OUTPATIENT
Start: 2021-12-02 | End: 2021-12-02

## 2021-12-02 RX ORDER — SODIUM CHLORIDE, SODIUM LACTATE, POTASSIUM CHLORIDE, CALCIUM CHLORIDE 600; 310; 30; 20 MG/100ML; MG/100ML; MG/100ML; MG/100ML
100 INJECTION, SOLUTION INTRAVENOUS CONTINUOUS
Status: DISCONTINUED | OUTPATIENT
Start: 2021-12-02 | End: 2021-12-02 | Stop reason: HOSPADM

## 2021-12-02 RX ORDER — BUPIVACAINE HYDROCHLORIDE 5 MG/ML
INJECTION, SOLUTION EPIDURAL; INTRACAUDAL
Status: DISCONTINUED | OUTPATIENT
Start: 2021-12-02 | End: 2021-12-02

## 2021-12-02 RX ORDER — DEXAMETHASONE SODIUM PHOSPHATE 4 MG/ML
INJECTION, SOLUTION INTRA-ARTICULAR; INTRALESIONAL; INTRAMUSCULAR; INTRAVENOUS; SOFT TISSUE AS NEEDED
Status: DISCONTINUED | OUTPATIENT
Start: 2021-12-02 | End: 2021-12-02 | Stop reason: HOSPADM

## 2021-12-02 RX ORDER — PHENYLEPHRINE HCL IN 0.9% NACL 0.4MG/10ML
SYRINGE (ML) INTRAVENOUS AS NEEDED
Status: DISCONTINUED | OUTPATIENT
Start: 2021-12-02 | End: 2021-12-02 | Stop reason: HOSPADM

## 2021-12-02 RX ORDER — FENTANYL CITRATE 50 UG/ML
INJECTION, SOLUTION INTRAMUSCULAR; INTRAVENOUS AS NEEDED
Status: DISCONTINUED | OUTPATIENT
Start: 2021-12-02 | End: 2021-12-02 | Stop reason: HOSPADM

## 2021-12-02 RX ORDER — SODIUM CHLORIDE, SODIUM LACTATE, POTASSIUM CHLORIDE, CALCIUM CHLORIDE 600; 310; 30; 20 MG/100ML; MG/100ML; MG/100ML; MG/100ML
75 INJECTION, SOLUTION INTRAVENOUS CONTINUOUS
Status: DISCONTINUED | OUTPATIENT
Start: 2021-12-02 | End: 2021-12-03 | Stop reason: HOSPADM

## 2021-12-02 RX ORDER — LIDOCAINE HYDROCHLORIDE 20 MG/ML
INJECTION, SOLUTION EPIDURAL; INFILTRATION; INTRACAUDAL; PERINEURAL AS NEEDED
Status: DISCONTINUED | OUTPATIENT
Start: 2021-12-02 | End: 2021-12-02 | Stop reason: HOSPADM

## 2021-12-02 RX ORDER — ACETAMINOPHEN 325 MG/1
650 TABLET ORAL EVERY 6 HOURS
Status: DISCONTINUED | OUTPATIENT
Start: 2021-12-02 | End: 2021-12-03 | Stop reason: HOSPADM

## 2021-12-02 RX ORDER — LIDOCAINE HYDROCHLORIDE 10 MG/ML
0.1 INJECTION, SOLUTION EPIDURAL; INFILTRATION; INTRACAUDAL; PERINEURAL AS NEEDED
Status: DISCONTINUED | OUTPATIENT
Start: 2021-12-02 | End: 2021-12-02 | Stop reason: HOSPADM

## 2021-12-02 RX ORDER — EPHEDRINE SULFATE/0.9% NACL/PF 50 MG/5 ML
SYRINGE (ML) INTRAVENOUS AS NEEDED
Status: DISCONTINUED | OUTPATIENT
Start: 2021-12-02 | End: 2021-12-02 | Stop reason: HOSPADM

## 2021-12-02 RX ADMIN — Medication 10 ML: at 13:51

## 2021-12-02 RX ADMIN — FENTANYL CITRATE 25 MCG: 50 INJECTION, SOLUTION INTRAMUSCULAR; INTRAVENOUS at 20:56

## 2021-12-02 RX ADMIN — FENTANYL CITRATE 50 MCG: 50 INJECTION, SOLUTION INTRAMUSCULAR; INTRAVENOUS at 19:10

## 2021-12-02 RX ADMIN — BUPIVACAINE HYDROCHLORIDE 10 ML/HR: 7.5 INJECTION, SOLUTION EPIDURAL; RETROBULBAR at 21:53

## 2021-12-02 RX ADMIN — FENTANYL CITRATE 25 MCG: 50 INJECTION, SOLUTION INTRAMUSCULAR; INTRAVENOUS at 20:39

## 2021-12-02 RX ADMIN — Medication 10 ML: at 22:44

## 2021-12-02 RX ADMIN — DEXAMETHASONE SODIUM PHOSPHATE 4 MG: 4 INJECTION, SOLUTION INTRAMUSCULAR; INTRAVENOUS at 20:05

## 2021-12-02 RX ADMIN — CEFAZOLIN SODIUM 2 G: 1 POWDER, FOR SOLUTION INTRAMUSCULAR; INTRAVENOUS at 20:01

## 2021-12-02 RX ADMIN — PANTOPRAZOLE SODIUM 40 MG: 40 TABLET, DELAYED RELEASE ORAL at 07:30

## 2021-12-02 RX ADMIN — Medication 10 MG: at 20:03

## 2021-12-02 RX ADMIN — Medication 10 ML: at 05:42

## 2021-12-02 RX ADMIN — ROPIVACAINE HYDROCHLORIDE 20 ML: 5 INJECTION, SOLUTION EPIDURAL; INFILTRATION; PERINEURAL at 19:10

## 2021-12-02 RX ADMIN — MIRTAZAPINE 7.5 MG: 15 TABLET, FILM COATED ORAL at 22:42

## 2021-12-02 RX ADMIN — ACETAMINOPHEN 650 MG: 325 TABLET ORAL at 22:43

## 2021-12-02 RX ADMIN — ROPIVACAINE HYDROCHLORIDE 20 ML: 5 INJECTION, SOLUTION EPIDURAL; INFILTRATION; PERINEURAL at 19:15

## 2021-12-02 RX ADMIN — FENTANYL CITRATE 25 MCG: 50 INJECTION, SOLUTION INTRAMUSCULAR; INTRAVENOUS at 20:11

## 2021-12-02 RX ADMIN — ACETAMINOPHEN 650 MG: 325 TABLET ORAL at 05:41

## 2021-12-02 RX ADMIN — FENTANYL CITRATE 25 MCG: 50 INJECTION, SOLUTION INTRAMUSCULAR; INTRAVENOUS at 21:03

## 2021-12-02 RX ADMIN — Medication 100 MCG: at 19:51

## 2021-12-02 RX ADMIN — PROPOFOL 50 MG: 10 INJECTION, EMULSION INTRAVENOUS at 19:52

## 2021-12-02 RX ADMIN — SODIUM CHLORIDE, POTASSIUM CHLORIDE, SODIUM LACTATE AND CALCIUM CHLORIDE 75 ML/HR: 600; 310; 30; 20 INJECTION, SOLUTION INTRAVENOUS at 13:51

## 2021-12-02 RX ADMIN — LIDOCAINE HYDROCHLORIDE 40 MG: 20 INJECTION, SOLUTION EPIDURAL; INFILTRATION; INTRACAUDAL; PERINEURAL at 19:50

## 2021-12-02 RX ADMIN — PROPOFOL 100 MG: 10 INJECTION, EMULSION INTRAVENOUS at 19:50

## 2021-12-02 RX ADMIN — SODIUM CHLORIDE, POTASSIUM CHLORIDE, SODIUM LACTATE AND CALCIUM CHLORIDE 75 ML/HR: 600; 310; 30; 20 INJECTION, SOLUTION INTRAVENOUS at 22:00

## 2021-12-02 RX ADMIN — Medication 100 MCG: at 19:59

## 2021-12-02 RX ADMIN — HYDROMORPHONE HYDROCHLORIDE 0.5 MG: 1 INJECTION, SOLUTION INTRAMUSCULAR; INTRAVENOUS; SUBCUTANEOUS at 21:45

## 2021-12-02 RX ADMIN — FENTANYL CITRATE 50 MCG: 50 INJECTION, SOLUTION INTRAMUSCULAR; INTRAVENOUS at 19:15

## 2021-12-02 NOTE — PROGRESS NOTES
Subjective:    Zander Cousin is a 80 y.o. female who is POD 9 s/o I&D with ex fix LEFT trimal ankle fx    Major Events:. Pain controlled    Objective:    Vital signs in last 24 hours:    Temp:  [97.4 °F (36.3 °C)-98.3 °F (36.8 °C)]   Pulse (Heart Rate):  [64-85]   BP: (107-149)/(56-77)   Resp Rate:  [16-18]   O2 Sat (%):  [92 %-99 %]   Weight:  [65.3 kg (144 lb)]     Temp (24hrs), Av.8 °F (36.6 °C), Min:97.5 °F (36.4 °C), Max:97.9 °F (36.6 °C)      Labs:    Lab Results   Component Value Date/Time    WBC 6.3 2021 06:13 AM    HGB 12.3 2021 06:13 AM    HCT 38.5 2021 06:13 AM    PLATELET 822  06:13 AM       Lab Results   Component Value Date/Time    Sodium 138 2021 06:13 AM    Potassium 3.8 2021 06:13 AM    Chloride 106 2021 06:13 AM    CO2 28 2021 06:13 AM    BUN 13 2021 06:13 AM       Physical Exam:    General: alert, cooperative, in NAD  Nonlabored resp    LEFT Lower extremity    Dressing: c/d/i  exfix in place      Motor: + toe df/pf    Sensory: SILT    Vascular: Brisk capillary refill in toes    Assessment/Plan:    · Pain management: as written    · DVT Prophylaxis: hold for sx today    · PT/OT: NWB    · Dispo: to SNF postop    PLAN OR today for removal of LEFT leg/ankle ex-fix and ORIF LEFT trimal ankle fx      Farooq Perla.  Jacque Arnold MD

## 2021-12-02 NOTE — PROGRESS NOTES
12/2/2021  9:16 AM  Transition of care plan   RUR 7%  LOS 9 Days  1. CM following  2. Ortho following plan for ORIF Lt ankle Thursday. 12/2  3. PT/OT following  4.pt accepted to  [facility], spoke w/ [facility] admissions they will  Begin auth today 12/2,  and can accept pt for transfer over the weekend    5. CM spoke w/ pt's son Robert Maxwell to update on plan  6. DC when stable to [facility]  7. Transport stretcher/ [facility] at DC   8.  CM will continue to follow and assist w/ DC needs  Karla Cockayne, KATIANA

## 2021-12-02 NOTE — ANESTHESIA PREPROCEDURE EVALUATION
Relevant Problems   GASTROINTESTINAL   (+) Gastroesophageal reflux disease without esophagitis      ENDOCRINE   (+) Subclinical hypothyroidism       Anesthetic History   No history of anesthetic complications            Review of Systems / Medical History  Patient summary reviewed, nursing notes reviewed and pertinent labs reviewed    Pulmonary  Within defined limits                 Neuro/Psych   Within defined limits        Pertinent negatives: No seizures, TIA and CVA   Cardiovascular  Within defined limits              Pertinent negatives: No past MI, angina and CHF       GI/Hepatic/Renal  Within defined limits   GERD           Endo/Other  Within defined limits    Hypothyroidism  Arthritis     Other Findings              Physical Exam    Airway  Mallampati: II  TM Distance: 4 - 6 cm  Neck ROM: normal range of motion   Mouth opening: Normal     Cardiovascular    Rhythm: regular  Rate: normal         Dental  No notable dental hx       Pulmonary  Breath sounds clear to auscultation               Abdominal         Other Findings            Anesthetic Plan    ASA: 2  Anesthesia type: regional and general - saphenous block and sciatic continuous          Induction: Intravenous  Anesthetic plan and risks discussed with: Patient      GA, LMA - popliteal catheter + single-shot saphenous nerve block

## 2021-12-02 NOTE — PROGRESS NOTES
Problem: Self Care Deficits Care Plan (Adult)  Goal: *Acute Goals and Plan of Care (Insert Text)  Description:   FUNCTIONAL STATUS PRIOR TO ADMISSION: Patient was independent and active without use of DME. 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 Occupational Therapy Goals  Initiated 11/24/2021. Reviewed 12/2/21 and updated below:  1. Patient will perform stand pivot transfer to and from bedside commode  with minimal assistance/contact guard assist and maintain NWB left LE within 7 day(s). MET 12/2/21 and upgrade to SBA. 2.  Patient will perform all aspects of toileting with moderate assistance  within 7 day(s). MET 12/2/21 and upgrade to min A.  3.  Patient will participate in upper extremity therapeutic exercise/activities with supervision/set-up for 10 minutes within 7 day(s). Continue   4. Patient will stand for functional task > or = 1 minute with bilateral UE support and maintain NWB left LE within 7 day(s). MET 12/2/21 and upgrade to SBA. >3 minutes using one UE as support. Outcome: Progressing Towards Goal     OCCUPATIONAL THERAPY TREATMENT/WEEKLY RE-EVALUATION  Patient: Troy Badillo (9 y.o. female)  Date: 12/2/2021  Diagnosis: Bimalleolar fracture of left ankle [S82.842A]   Bimalleolar fracture of left ankle  Procedure(s) (LRB):  LEFT ANKLE WASHOUT WITH EXTERNAL FIXATION (Left) 9 Days Post-Op  Precautions: NWB  Chart, occupational therapy assessment, plan of care, and goals were reviewed. ASSESSMENT  Based on the objective data described below, pt is making steady progress towards goals. Re-educated pt on yellow theraband UE HEP and provided written handout. Pt required mod verbal, visual and tactile instruction to perform correctly. Pt is scheduled for L ankle ORIF later this afternoon.   Will re-assess function post sx. Continue to recommend SNF at discharge. Current Level of Function Impacting Discharge (ADLs): min A LE ADLs, mod A toileting, min A functional mobility near bedside using RW and LLE NWB    Other factors to consider for discharge: see above         PLAN :  Goals have been updated based on progression since last assessment. Patient continues to benefit from skilled intervention to address the above impairments. Continue to follow patient 5 times a week to address goals. Recommend with staff: up to chair for all meals an BSC for toileting    Recommend next OT session: Re-assess post sx    Recommendation for discharge: (in order for the patient to meet his/her long term goals)  Therapy up to 5 days/week in SNF setting    This discharge recommendation:  Has been made in collaboration with the attending provider and/or case management    Equipment recommendations for successful discharge (if) home: TBD       SUBJECTIVE:   Patient stated I feel ok.     OBJECTIVE DATA SUMMARY:   Cognitive/Behavioral Status:  Neurologic State: Alert; Appropriate for age  Orientation Level: Oriented X4  Cognition: Follows commands  Perception: Appears intact  Perseveration: No perseveration noted  Safety/Judgement: Awareness of environment; Fall prevention; Insight into deficits    Functional Mobility and Transfers for ADLs:  Bed Mobility:  Rolling: Modified independent  Supine to Sit: Contact guard assistance (for LLE managment)  Scooting: Stand-by assistance;  Additional time (up side of bed to [facility])    Transfers:  Sit to Stand: Minimum assistance; Contact guard assistance          Balance:  Sitting: Intact  Standing: Impaired  Standing - Static: Good; Constant support  Standing - Dynamic : Fair; Constant support    ADL Intervention:  Lower Body Dressing Assistance  Socks: Modified independent (R)  Position Performed: Seated edge of bed  Cues: Joana Garces; Verbal cues provided      Cognitive Retraining  Safety/Judgement: Awareness of environment; Fall prevention; Insight into deficits    Therapeutic Exercises:  Re-educated pt on yellow theraband UE HEP and provided written handout. Pt required mod verbal, visual and tactile instruction to perform 6 exer at 10 reps each correctly. Brief rest break provided between each exer.     Pain:  No c/o pain    Activity Tolerance:   Good    After treatment patient left in no apparent distress:   Supine in bed, Call bell within reach, and Bed / chair alarm activated    COMMUNICATION/COLLABORATION:   The patients plan of care was discussed with: Registered Nurse    Joshua Loyd OT  Time Calculation: 23 mins

## 2021-12-02 NOTE — PROGRESS NOTES
Edvin Harden [facility]  [address] [facility], [place], [address] [place] [address]  [phone]      Medical Progress Note      NAME: Reynold Gaston   :  1935  MRM:  [id number]    Date/Time: 2021        Assessment / Plan:     81 yo F w/ hx of GERD admitted for L open ankle fx s/p closed reduction . Open bimalleolar L ankle fracture:  Fx POA, due from mechanical GLF fall. S/p closed reduction on , and now planning ORIF today (). PT/OT, DVT ppx, DC planning. Pain control on PO and IV opioid analgesics PRN     Anemia: low iron, normal ferritin. Hg normalized post-op, will likely drop again post-op    Hypercholesterolemia: Not on meds,  and trig 134 and HDL 58 on panel. Agree no statin is needed considering her age and RFs     Insomnia: PRN Remeron     GERD: cont PPI     Constipation: resolved with Miralax      Total time spent: 25 minutes  Time spent in the care of this patient including reviewing records, discussing with nursing and/or other providers on the treatment team, obtaining history and examining the patient, and discussing treatment plans. Care Plan discussed with: Patient, Nursing Staff and >50% of time spent in counseling and coordination of care    Discussed:  Care Plan and D/C Planning    Prophylaxis:  Lovenox to resume post-op    Disposition:  SNF/LTC         Subjective:     Chief Complaint:  Follow up ankle fx    Chart/notes/labs/studies reviewed, patient examined. Feels fine. Ankle pain controlled. Awaiting surgery. Objective:       Vitals:        Last 24hrs VS reviewed since prior progress note.  Most recent are:    Visit Vitals  /65 (BP 1 Location: Right upper arm, BP Patient Position: Lying)   Pulse 68   Temp 98.1 °F (36.7 °C)   Resp 18   Ht 5' 2\" (1.575 m)   Wt 65.3 kg (144 lb)   SpO2 (!) 5%   BMI 26.34 kg/m²     SpO2 Readings from Last 6 Encounters:   21 (!) 5%   10/12/21 95%   10/06/20 95%   09/15/20 98% 06/25/19 97%   01/04/19 100%            Intake/Output Summary (Last 24 hours) at 12/2/2021 1814  Last data filed at 12/2/2021 0840  Gross per 24 hour   Intake 0 ml   Output --   Net 0 ml          Exam:     Physical Exam:    Gen:  Well-developed, well-nourished, in no acute distress. Pleasant   HEENT:  Atraumatic, normocephalic. Sclerae nonicteric, hearing intact to voice  Neck:  Supple, without apparent masses. Resp:  No accessory muscle use, CTAB without wheezes, rales, or rhonchi  Card: RRR, without m/r/g. Abd:  +bowel sounds, soft, NTTP, nondistended. No HSM  Neuro: Face symmetric, speech fluent, follows commands appropriately, no focal weakness or numbness  Psych:  Alert, oriented x 3.  Good insight     Medications Reviewed: (see below)    Lab Data Reviewed: (see below)    ______________________________________________________________________    Medications:     Current Facility-Administered Medications   Medication Dose Route Frequency    acetaminophen (TYLENOL) tablet 650 mg  650 mg Oral Q6H    lactated Ringers infusion  75 mL/hr IntraVENous CONTINUOUS    ceFAZolin (ANCEF) 2 g in sterile water (preservative free) 20 mL IV syringe  2 g IntraVENous ONCE    polyethylene glycol (MIRALAX) packet 17 g  17 g Oral BID    hydrALAZINE (APRESOLINE) 20 mg/mL injection 10 mg  10 mg IntraVENous Q6H PRN    sodium chloride (NS) flush 5-40 mL  5-40 mL IntraVENous Q8H    sodium chloride (NS) flush 5-40 mL  5-40 mL IntraVENous PRN    polyethylene glycol (MIRALAX) packet 17 g  17 g Oral DAILY PRN    senna (SENOKOT) tablet 8.6 mg  1 Tablet Oral DAILY PRN    promethazine (PHENERGAN) tablet 12.5 mg  12.5 mg Oral Q6H PRN    Or    ondansetron (ZOFRAN) injection 4 mg  4 mg IntraVENous Q6H PRN    morphine injection 2 mg  2 mg IntraVENous Q4H PRN    pantoprazole (PROTONIX) tablet 40 mg  40 mg Oral ACB    sodium chloride (NS) flush 5-40 mL  5-40 mL IntraVENous Q8H    sodium chloride (NS) flush 5-40 mL  5-40 mL IntraVENous PRN    naloxone (NARCAN) injection 0.4 mg  0.4 mg IntraVENous PRN    calcium-vitamin D (OS-OLGA LIDIA +D3) 500 mg-200 unit per tablet 1 Tablet  1 Tablet Oral TID WITH MEALS    senna-docusate (PERICOLACE) 8.6-50 mg per tablet 1 Tablet  1 Tablet Oral BID    bisacodyL (DULCOLAX) suppository 10 mg  10 mg Rectal DAILY PRN    mirtazapine (REMERON) tablet 7.5 mg  7.5 mg Oral QHS    traMADoL (ULTRAM) tablet 50 mg  50 mg Oral Q6H PRN    oxyCODONE IR (ROXICODONE) tablet 5 mg  5 mg Oral Q4H PRN            Lab Review:     Recent Labs     12/01/21  0613 11/30/21  0403   WBC 6.3 5.7   HGB 12.3 12.3   HCT 38.5 39.1    312     Recent Labs     12/01/21  0613      K 3.8      CO2 28   GLU 96   BUN 13   CREA 0.74   CA 8.8   MG 2.4   PHOS 3.4   ALB 2.8*     No components found for: GLPOC  No results for input(s): PH, PCO2, PO2, HCO3, FIO2 in the last 72 hours. No results for input(s): INR, INREXT, INREXT in the last 72 hours.   No results found for: SDES  No results found for: CULT           ___________________________________________________    Attending Physician: Uli Patel MD

## 2021-12-02 NOTE — PROGRESS NOTES
0700 Bedside and Verbal shift change report given to Peg Goodman (oncoming nurse) by Christy Cee (offgoing nurse). Report included the following information SBAR, Procedure Summary, Intake/Output and MAR.

## 2021-12-02 NOTE — BRIEF OP NOTE
"Requested Prescriptions   Pending Prescriptions Disp Refills     valACYclovir (VALTREX) 500 MG tablet [Pharmacy Med Name: VALACYCLOVIR 500MG TABLETS] 90 tablet 0     Sig: TAKE 1 TABLET(500 MG) BY MOUTH DAILY    Antivirals for Herpes Protocol Passed    7/28/2018 12:54 PM       Passed - Patient is age 12 or older       Passed - Recent (12 mo) or future (30 days) visit within the authorizing provider's specialty    Patient had office visit in the last 12 months or has a visit in the next 30 days with authorizing provider or within the authorizing provider's specialty.  See \"Patient Info\" tab in inbasket, or \"Choose Columns\" in Meds & Orders section of the refill encounter.           Passed - Normal serum creatinine on file in past 12 months    Recent Labs   Lab Test  05/29/18   2042   CR  1.09               Last Written Prescription Date: 7/10/2017  Last Fill Quantity: 90  # refills: 3  Last office visit: 2/21/2018 with prescribing provider:    Future Office Visit:      " Brief Postoperative Note    Patient: Yg Trevino  YOB: 1935  MRN: [id number]    Date of Procedure: 12/2/2021    Pre-Op Diagnosis:   1. Left type 2 open trimalleolar ankle fx ankle fracture    Post-Op Diagnosis: Same as preoperative diagnosis. Procedure(s):  1. Removal of LEFT leg/ankle ex-fix  2. ORIF LEFT trimalleolar ankle fracture  3. Independent interpretation of intra-operative fluoroscopy    Surgeon(s): Yeni Boss MD    Surgical Assistant: Kash Ruano PA-C. A skilled assistant was needed for retraction, assistance with reduction and hardware placement as well as closure and splint placement      Anesthesia: regional with general    Estimated Blood Loss (mL): less than 5cc     Complications: None    Specimens: * No specimens in log *     Implants:     Bryanna 4.0 x 50mm partially threaded cannulated screws  Bryanna lateral plate/screws    Drains: * No LDAs found *    Findings: LEFT trimalleolar ankle fx, medial wound healing well, no purulence/signs of infection    TT: thigh 300mmHg x 35 min    Electronically Signed by Anai Torrez MD on 12/2/2021 at 11:07 AM

## 2021-12-02 NOTE — PROGRESS NOTES
Problem: Mobility Impaired (Adult and Pediatric)  Goal: *Acute Goals and Plan of Care (Insert Text)  Description: Physical Therapy Goals  Revised 12/1/2021  1. Patient will move from supine to sit and sit to supine  in bed with modified independence within 7 day(s). 2.  Patient will transfer from bed to chair and chair to bed with minimal assistance/contact guard assist using the least restrictive device within 7 day(s). 3.  Patient will perform sit to stand with supervision/set-up within 7 day(s). 4.  Patient will ambulate with minimal assistance/contact guard assist for 25 feet with the least restrictive device within 7 day(s). FUNCTIONAL STATUS PRIOR TO ADMISSION: Patient was independent and active without use of DME.    HOME SUPPORT PRIOR TO ADMISSION: The patient lived with  but did not require assist. Pt's  has dementia and pt is the caregiver. Physical Therapy Goals  Initiated 11/24/2021  1. Patient will move from supine to sit and sit to supine , scoot up and down, and roll side to side in bed with modified independence within 7 day(s). 2.  Patient will transfer from bed to chair and chair to bed with supervision/set-up using the least restrictive device within 7 day(s). 3.  Patient will perform sit to stand with supervision/set-up within 7 day(s). 4.  Patient will ambulate with moderate assist for 5 feet with the least restrictive device within 7 day(s). Outcome: Progressing Towards Goal   PHYSICAL THERAPY TREATMENT  Patient: Evelyn Dobbs (22 y.o. female)  Date: 12/2/2021  Diagnosis: Bimalleolar fracture of left ankle [S82.842A]   Bimalleolar fracture of left ankle  Procedure(s) (LRB):  LEFT ANKLE WASHOUT WITH EXTERNAL FIXATION (Left) 9 Days Post-Op  Precautions: NWB  Chart, physical therapy assessment, plan of care and goals were reviewed. ASSESSMENT  Patient continues with skilled PT services and is progressing towards goals.  Pt received supine in bed needing to scoot to [facility]. Bed placed flat. Pt able to scoot self up to [facility] using RLE. Supine to sit with CGA for LLE management. Provided youth RW  due to pt short ht. Pt able to bear better wt thru arms with this ht. Continues to need verbal cues with UE placement with RW use.  6'/3' x2 to chair with Min A. Pt maintaining NWB on LLE very well. Placed in recliner with BLE elevated in NAD. Surgery for ORIF of L ankle this afternoon. Will re-eval with MD orders next tx day. Current Level of Function Impacting Discharge (mobility/balance): Min to CGA/good    Other factors to consider for discharge: per above         PLAN :  Patient continues to benefit from skilled intervention to address the above impairments. Continue treatment per established plan of care. to address goals. Recommendation for discharge: (in order for the patient to meet his/her long term goals)  Therapy up to 5 days/week in SNF setting    This discharge recommendation:  Has been made in collaboration with the attending provider and/or case management    IF patient discharges home will need the following DME: to be determined (TBD)       SUBJECTIVE:   Patient stated I am doing ok.     OBJECTIVE DATA SUMMARY:   Critical Behavior:  Neurologic State: Alert  Orientation Level: Oriented X4  Cognition: Follows commands, Appropriate decision making  Safety/Judgement: Awareness of environment, Fall prevention, Home safety, Insight into deficits  Functional Mobility Training:  Bed Mobility:  Rolling: Modified independent  Supine to Sit: Contact guard assistance (for LLE managment)              Transfers:  Sit to Stand: Minimum assistance; Contact guard assistance  Stand to Sit: Minimum assistance; Contact guard assistance                             Balance:  Sitting: Intact  Standing: Impaired  Standing - Static: Good; Constant support  Standing - Dynamic : Fair; Constant support  Ambulation/Gait Training:  Distance (ft): 6 Feet (ft) (3x2)  Assistive Device: Walker, rolling; Gait belt  Ambulation - Level of Assistance: Minimal assistance        Gait Abnormalities: Antalgic; Decreased step clearance     Left Side Weight Bearing: Non-weight bearing                              Pain Rating:  No c/o pain    Activity Tolerance:   Good    After treatment patient left in no apparent distress:   Sitting in chair, Heels elevated for pressure relief, and Call bell within reach    COMMUNICATION/COLLABORATION:   The patients plan of care was discussed with: Registered nurse.      Adrian Moreno, PT   Time Calculation: 20 mins

## 2021-12-02 NOTE — PROGRESS NOTES
Problem: Pain  Goal: *Control of Pain  Outcome: Progressing Towards Goal     Problem: Falls - Risk of  Goal: *Absence of Falls  Description: Document Celio Fall Risk and appropriate interventions in the flowsheet. Outcome: Progressing Towards Goal  Note: Fall Risk Interventions:  Mobility Interventions: Patient to call before getting OOB    Mentation Interventions: Adequate sleep, hydration, pain control    Medication Interventions: Patient to call before getting OOB    Elimination Interventions: Call light in reach    History of Falls Interventions: Investigate reason for fall         Problem: Pressure Injury - Risk of  Goal: *Prevention of pressure injury  Description: Document Eligio Scale and appropriate interventions in the flowsheet.   Outcome: Progressing Towards Goal  Note: Pressure Injury Interventions:  Sensory Interventions: Assess changes in LOC    Moisture Interventions: Absorbent underpads    Activity Interventions: Increase time out of bed    Mobility Interventions: Chair cushion    Nutrition Interventions: Document food/fluid/supplement intake    Friction and Shear Interventions: HOB 30 degrees or less

## 2021-12-03 VITALS
RESPIRATION RATE: 18 BRPM | SYSTOLIC BLOOD PRESSURE: 118 MMHG | DIASTOLIC BLOOD PRESSURE: 77 MMHG | TEMPERATURE: 98 F | OXYGEN SATURATION: 96 % | HEIGHT: 62 IN | BODY MASS INDEX: 26.45 KG/M2 | WEIGHT: 143.74 LBS | HEART RATE: 76 BPM

## 2021-12-03 LAB
COVID-19 RAPID TEST, COVR: NOT DETECTED
SOURCE, COVRS: NORMAL

## 2021-12-03 PROCEDURE — 97116 GAIT TRAINING THERAPY: CPT

## 2021-12-03 PROCEDURE — 74011250636 HC RX REV CODE- 250/636: Performed by: INTERNAL MEDICINE

## 2021-12-03 PROCEDURE — 74011250637 HC RX REV CODE- 250/637: Performed by: PHYSICIAN ASSISTANT

## 2021-12-03 PROCEDURE — 97164 PT RE-EVAL EST PLAN CARE: CPT

## 2021-12-03 PROCEDURE — 74011250637 HC RX REV CODE- 250/637: Performed by: ORTHOPAEDIC SURGERY

## 2021-12-03 PROCEDURE — 74011000250 HC RX REV CODE- 250: Performed by: INTERNAL MEDICINE

## 2021-12-03 PROCEDURE — 87635 SARS-COV-2 COVID-19 AMP PRB: CPT

## 2021-12-03 PROCEDURE — 97530 THERAPEUTIC ACTIVITIES: CPT

## 2021-12-03 PROCEDURE — 74011250637 HC RX REV CODE- 250/637: Performed by: INTERNAL MEDICINE

## 2021-12-03 PROCEDURE — 74011250636 HC RX REV CODE- 250/636: Performed by: PHYSICIAN ASSISTANT

## 2021-12-03 RX ORDER — OXYCODONE HYDROCHLORIDE 5 MG/1
5 TABLET ORAL
Status: DISCONTINUED | OUTPATIENT
Start: 2021-12-03 | End: 2021-12-03 | Stop reason: HOSPADM

## 2021-12-03 RX ORDER — AMOXICILLIN 250 MG
1 CAPSULE ORAL 2 TIMES DAILY
Qty: 30 TABLET | Refills: 0 | Status: SHIPPED
Start: 2021-12-03

## 2021-12-03 RX ORDER — TRAMADOL HYDROCHLORIDE 50 MG/1
50 TABLET ORAL
Qty: 20 TABLET | Refills: 0 | Status: SHIPPED | OUTPATIENT
Start: 2021-12-03 | End: 2021-12-06

## 2021-12-03 RX ORDER — NALOXONE HYDROCHLORIDE 0.4 MG/ML
0.4 INJECTION, SOLUTION INTRAMUSCULAR; INTRAVENOUS; SUBCUTANEOUS AS NEEDED
Status: DISCONTINUED | OUTPATIENT
Start: 2021-12-03 | End: 2021-12-03 | Stop reason: HOSPADM

## 2021-12-03 RX ORDER — MORPHINE SULFATE 2 MG/ML
2 INJECTION, SOLUTION INTRAMUSCULAR; INTRAVENOUS
Status: DISCONTINUED | OUTPATIENT
Start: 2021-12-03 | End: 2021-12-03 | Stop reason: HOSPADM

## 2021-12-03 RX ADMIN — DOCUSATE SODIUM 50MG AND SENNOSIDES 8.6MG 1 TABLET: 8.6; 5 TABLET, FILM COATED ORAL at 10:28

## 2021-12-03 RX ADMIN — PANTOPRAZOLE SODIUM 40 MG: 40 TABLET, DELAYED RELEASE ORAL at 07:04

## 2021-12-03 RX ADMIN — ENOXAPARIN SODIUM 40 MG: 40 INJECTION SUBCUTANEOUS at 10:28

## 2021-12-03 RX ADMIN — ACETAMINOPHEN 650 MG: 325 TABLET ORAL at 18:07

## 2021-12-03 RX ADMIN — ACETAMINOPHEN 650 MG: 325 TABLET ORAL at 13:38

## 2021-12-03 RX ADMIN — POLYETHYLENE GLYCOL 3350 17 G: 17 POWDER, FOR SOLUTION ORAL at 10:28

## 2021-12-03 RX ADMIN — Medication 10 ML: at 05:06

## 2021-12-03 RX ADMIN — Medication 10 ML: at 13:39

## 2021-12-03 RX ADMIN — Medication 10 ML: at 07:05

## 2021-12-03 RX ADMIN — WATER 2 G: 1 INJECTION INTRAMUSCULAR; INTRAVENOUS; SUBCUTANEOUS at 10:29

## 2021-12-03 RX ADMIN — ACETAMINOPHEN 650 MG: 325 TABLET ORAL at 07:04

## 2021-12-03 RX ADMIN — DOCUSATE SODIUM 50MG AND SENNOSIDES 8.6MG 1 TABLET: 8.6; 5 TABLET, FILM COATED ORAL at 18:07

## 2021-12-03 RX ADMIN — Medication 1 TABLET: at 13:38

## 2021-12-03 RX ADMIN — POLYETHYLENE GLYCOL 3350 17 G: 17 POWDER, FOR SOLUTION ORAL at 18:08

## 2021-12-03 RX ADMIN — Medication 1 TABLET: at 18:07

## 2021-12-03 RX ADMIN — WATER 2 G: 1 INJECTION INTRAMUSCULAR; INTRAVENOUS; SUBCUTANEOUS at 02:55

## 2021-12-03 RX ADMIN — Medication 1 TABLET: at 10:28

## 2021-12-03 NOTE — OP NOTES
Edvin Boudreaux [facility]  OPERATIVE REPORT    Name:  Isac Carbajal  MR#:  [id number]  :  1935  ACCOUNT #:  [de-identified]  DATE OF SERVICE:  2021    PREOPERATIVE DIAGNOSIS:  Left type 2 open trimalleolar ankle fracture. POSTOPERATIVE DIAGNOSIS:  Left type 2 open trimalleolar ankle fracture. PROCEDURES PERFORMED:  1. Removal of left leg and ankle external fixator. 2.  ORIF, left trimalleolar ankle fracture. 3.  Independent interpretation of intraoperative fluoroscopy. SURGEON:  Anthony Lynn. Toby Muro MD    ASSISTANT:  Lissett Boudreaux PA-C. A skilled assistant was needed for retraction, assistance with reduction and hardware placement as well as closure and splint application. ANESTHESIA:  Regional and general.    COMPLICATIONS:  None. SPECIMENS REMOVED:  None. IMPLANTS:  [place] 4 x 50 mm partially-threaded cannulated screws and Bryanna lateral plate and screws. ESTIMATED BLOOD LOSS:  Less than 5 mL. DRAINS:  None. FINDINGS:  Left trimalleolar ankle fracture. The medial wound was healing well. There was no purulence and no signs of infection. The syndesmosis was stable after fixation of the medial and lateral malleoli and the posterior malleolus fracture involved less than 15% of joint surface and was anatomically aligned after fixing the ankle and did not require fixation. The ankle joint was congruent on the lateral view and with dorsiflexion and plantarflexion under dynamic fluoroscopic imaging. The talus remained well reduced under the tibia without abnormal posterior or anterior subluxation on the tibia. TOURNIQUET TIME:  Thigh tourniquet at 300 mmHg for 35 minutes. INDICATIONS FOR PROCEDURE:  This is an 59-year-old female who suffered a left type 2 open trimalleolar ankle fracture on 2021. I initially treated the patient with irrigation and excisional debridement of her wound, closure of the wound and external fixation after closed reduction. The patient was admitted to the hospital and was placed on IV antibiotics for her open fracture. She remained elevated and was carefully watched over the last nine days and her ankle swelling improved. She had positive skin wrinkles and this is an unstable injury. I recommended surgical fixation with removal external fixator and ORIF of the fracture to promote healing, restore anatomy and function and allow for early weightbearing and motion and also to potentially prevent nonunion, malunion and posttraumatic osteoarthritis of the ankle. I discussed the risks of surgery which include but are not limited to complications of anesthesia including death, pain, bleeding, infection, damage to surrounding structures, nonunion, malunion, DVT, PE, wound healing problems and the need for further surgery as well as ankle arthritis and continued pain. The patient verbalized understanding and elected to proceed with surgical intervention. PROCEDURE:  The correct patient, extremity and operation were all identified in preoperative holding area and I marked her left ankle. The Anesthesia team provided a regional block. She was taken back to operating room, placed successfully under general anesthesia and placed in the floppy lateral decubitus position on the operating room table and all bony prominences were padded well. She was secured to the table with a safety strap and a beanbag. A nonsterile well-padded upper thigh tourniquet was placed and the left lower extremity was prepped and draped in usual sterile fashion and the external fixator was prepped into the field. The patient received IV antibiotics within 30 minutes of the incision for prophylaxis. The left leg was then elevated for several minutes and tourniquet was raised. Attention was first turned to medial malleolus.   Given the fact that her open wound was directly over her medial malleolus and it was healing well and this fracture on the medial side was near anatomically reduced and the external fixator, I made the decision to treat this with percutaneous screws. Fluoroscopic images in the AP, mortise and lateral planes intraoperatively showed that with the external fixator in place, the fracture was in near anatomic alignment. I was able to apply slight anterior pressure over the medial shoulder of the ankle joint and the medial malleolus fracture fell into an anatomic reduction. Two wires were inserted percutaneously parallel to one another, retrograde in the medial malleolus  by approximately 1-2 cm. Fluoroscopic images showed satisfactory placement of the wires that did not violate the joint. The wires were parallel to one another and in satisfactory position on the AP, mortise and lateral views. These wires were overdrilled and measured through small stab incision at the entry point of the pin into the skin and two 4 x 50 mm cannulated screws were placed over the wires after drilling and secured in the bone and the wires were removed. Pressure was released from the ankle and the medial malleolus fracture remained in anatomically reduced position. The external fixator was then removed. A longitudinal incision was made centered over the lateral malleolus. Sharp dissection was carried down to the level of skin. Blunt dissection carried down to the level of bone taking care to mobilize and retract the superficial peroneal nerve. The fracture was then curetted of intervening hematoma and periosteum and anatomically reduced with a reduction clamp. This fracture was a very short oblique fracture and given the position of the fracture and orientation of the fracture, a lag screw could not be placed. A distal fibula locking plate from the [place] ankle set was placed and secured to the distal fragment with locking screws.   The fracture remained in an anatomic reduction with the reduction clamp in place and the plate was affixed to the bone proximally with bicortical screws. External rotation stress view and Cotton testing showed no abnormal medial clear space or syndesmosis widening. The plate was further affixed to the bone with 4x4 cortical screws proximal to the fracture site and three locking screws distal to the fracture site. Final fluoroscopic images of the left ankle in the AP, mortise and lateral planes showed anatomic reduction of the ankle fracture and satisfactory placement of the hardware. Under the lateral view with dorsiflexion and plantarflexion with dynamic fluoroscopic imaging, the posterior malleolus fracture remained in the well-reduced position without fixation and there was no abnormal subluxation of the talus on the tibia and thus, I made the decision to treat the posterior malleolus fracture nonoperatively. The wound was copiously irrigated with normal saline and it was then closed in layers. A sterile dressing was applied and a well-padded, well-molded short leg splint was applied to the left lower extremity and tourniquet was dropped. The patient was awakened from anesthesia and taken to recovery room in hemodynamically stable condition. All lap and sharp counts were correct at the end of the case. POSTOPERATIVE CARE:  The patient will be admitted back to the hospitalist service. She will be nonweightbearing to the left lower extremity for at least six weeks, possibly longer. She will start Lovenox 40 mg subcu daily for DVT prophylaxis on the morning of postop day #1 and when she transitions to home or a skilled nursing facility, she will transition to aspirin 81 mg p.o. b.i.d. for a total of six weeks postoperatively starting on 12/02/2021. She will follow up with Justine Bennett in one week for a wound check at the [facility], [phone].       Hodges Leyden, MD      [id number]  D:  12/02/2021 20:48  T:  12/03/2021 7:42  JOB #:  [id number]

## 2021-12-03 NOTE — PROGRESS NOTES
12/3/2021   12:06 PM  CM updated pt's son, Teresa Villafana , regarding plan for 16:00 pick-up and discharge to [facility].    11:17 AM  Care Management Progress Note      ICD-10-CM ICD-9-CM    1. Type I or II open bimalleolar fracture of left ankle, initial encounter  S82.842B 824.5 traMADoL (ULTRAM) 50 mg tablet       RUR:  7%  Risk Level: [x]Low []Moderate []High  Value-based purchasing: [x] Yes [] No  Bundle patient: [] Yes [x] No   Specify:     Transition of care plan:  1. Medically stable with plan for discharge today  2. Accepted by [facility] and [facility] given  3. Outpatient follow-up. 4. [facility] transport @ 16:00 - per [facility] response in [employer]  5. No further CM needs identified    Care Management Interventions  PCP Verified by CM: Yes Ying Cassette)  Mode of Transport at Discharge: Other (see comment)  Transition of Care Consult (CM Consult): Discharge Planning  MyChart Signup: No  Discharge Durable Medical Equipment: No  Physical Therapy Consult: Yes  Occupational Therapy Consult: Yes  Speech Therapy Consult: No  Support Systems: Spouse/Significant Other ( has dementia)  Confirm Follow Up Transport: Family  Discharge Location  Discharge Placement: Skilled nursing facility    11:05 AM  PA Will stated ortho will clear patient this morning. MD Justine Marlow has placed rapid test order and stated patient will discharge. CM spoke with Bonnie at the [id number] [place] [address] who stated they can accept patient after 14:00. CM requested 15:00 pick-up time with [facility] via [employer], awaiting response. 10:45 AM  CM spoke with Noel Patel at [address] [facility] - insurance authorization received for patient. CM Perfect[employer] MD Shaver to inquire if patient will discharge today and request rapid COVID. MD Shaver states patient needs to be cleared by ortho.     Garfield Lennox, RN

## 2021-12-03 NOTE — PROGRESS NOTES
Problem: Pain  Goal: *Control of Pain  12/3/2021 0952 by Eliezer Craig  Outcome: Progressing Towards Goal  12/3/2021 0949 by Eliezer Craig  Outcome: Progressing Towards Goal  Goal: *PALLIATIVE CARE:  Alleviation of Pain  Outcome: Progressing Towards Goal     Problem: Patient Education: Go to Patient Education Activity  Goal: Patient/Family Education  Outcome: Progressing Towards Goal     Problem: Falls - Risk of  Goal: *Absence of Falls  Description: Document Madeline Ayers Fall Risk and appropriate interventions in the flowsheet.   12/3/2021 0952 by Eliezer Craig  Outcome: Progressing Towards Goal  Note: Fall Risk Interventions:  Mobility Interventions: Bed/chair exit alarm, PT Consult for mobility concerns, PT Consult for assist device competence, Utilize walker, cane, or other assistive device    Mentation Interventions: Adequate sleep, hydration, pain control, More frequent rounding, Toileting rounds    Medication Interventions: Assess postural VS orthostatic hypotension, Patient to call before getting OOB, Teach patient to arise slowly    Elimination Interventions: Bed/chair exit alarm, Call light in reach, Toileting schedule/hourly rounds    History of Falls Interventions: Bed/chair exit alarm      12/3/2021 0949 by Eliezer Craig  Outcome: Progressing Towards Goal  Note: Fall Risk Interventions:  Mobility Interventions: Bed/chair exit alarm, PT Consult for mobility concerns, PT Consult for assist device competence, Utilize walker, cane, or other assistive device    Mentation Interventions: Adequate sleep, hydration, pain control, More frequent rounding, Toileting rounds    Medication Interventions: Assess postural VS orthostatic hypotension, Patient to call before getting OOB, Teach patient to arise slowly    Elimination Interventions: Bed/chair exit alarm, Call light in reach, Toileting schedule/hourly rounds    History of Falls Interventions: Bed/chair exit alarm         Problem: Patient Education: Go to Patient Education Activity  Goal: Patient/Family Education  Outcome: Progressing Towards Goal     Problem: Body Temperature -  Risk of, Imbalanced  Goal: *Absence of heat stress or hyperthermia signs and symptoms  Outcome: Progressing Towards Goal     Problem: Pressure Injury - Risk of  Goal: *Prevention of pressure injury  Description: Document Eligio Scale and appropriate interventions in the flowsheet. Outcome: Progressing Towards Goal  Note: Pressure Injury Interventions:  Sensory Interventions: Assess changes in LOC, Discuss PT/OT consult with provider, Turn and reposition approx.  every two hours (pillows and wedges if needed)    Moisture Interventions: Absorbent underpads, Apply protective barrier, creams and emollients, Minimize layers    Activity Interventions: Increase time out of bed, PT/OT evaluation    Mobility Interventions: Chair cushion    Nutrition Interventions: Document food/fluid/supplement intake    Friction and Shear Interventions: Feet elevated on foot rest, Transferring/repositioning devices                Problem: Patient Education: Go to Patient Education Activity  Goal: Patient/Family Education  Outcome: Progressing Towards Goal     Problem: Patient Education: Go to Patient Education Activity  Goal: Patient/Family Education  Outcome: Progressing Towards Goal     Problem: Patient Education: Go to Patient Education Activity  Goal: Patient/Family Education  Outcome: Progressing Towards Goal     Problem: Hypertension  Goal: *Blood pressure within specified parameters  Outcome: Progressing Towards Goal

## 2021-12-03 NOTE — ANESTHESIA PROCEDURE NOTES
Peripheral Block    Start time: 12/2/2021 7:10 PM  End time: 12/2/2021 7:25 PM  Performed by: Gonzalo Serrato MD  Authorized by: Gonzalo Serrato MD       Pre-procedure:    Indications: at surgeon's request and post-op pain management    Preanesthetic Checklist: patient identified, risks and benefits discussed, site marked, timeout performed, anesthesia consent given and patient being monitored    Timeout Time: 19:10 EST          Block Type:   Block Type:  Saphenous  Laterality:  Left  Monitoring:  Standard ASA monitoring, responsive to questions, oxygen, continuous pulse ox, frequent vital sign checks and heart rate  Injection Technique:  Single shot  Procedures: ultrasound guided    Patient Position: supine  Prep: chlorhexidine    Needle Type:  Stimuplex  Needle Gauge:  21 G  Needle Localization:  Ultrasound guidance  Medication Injected:  FentaNYL citrate (PF) injection sedation initial, 50 mcg  ropivacaine (PF) (NAROPIN)(0.5%) 5 mg/mL injection, 20 mL  Med Admin Time: 12/2/2021 7:15 PM    Assessment:  Number of attempts:  1  Injection Assessment:  Incremental injection every 5 mL, ultrasound image on chart, local visualized surrounding nerve on ultrasound, no paresthesia, negative aspiration for blood and no intravascular symptoms  Patient tolerance:  Patient tolerated the procedure well with no immediate complications

## 2021-12-03 NOTE — PROGRESS NOTES
Problem: Mobility Impaired (Adult and Pediatric)  Goal: *Acute Goals and Plan of Care (Insert Text)  Description: Physical Therapy Goals  Revised 12/1/2021; continue same goals as of 12/3/2021    1. Patient will move from supine to sit and sit to supine  in bed with modified independence within 7 day(s). 2.  Patient will transfer from bed to chair and chair to bed with minimal assistance/contact guard assist using the least restrictive device within 7 day(s). 3.  Patient will perform sit to stand with supervision/set-up within 7 day(s). 4.  Patient will ambulate with minimal assistance/contact guard assist for 25 feet with the least restrictive device within 7 day(s). FUNCTIONAL STATUS PRIOR TO ADMISSION: Patient was independent and active without use of DME.    HOME SUPPORT PRIOR TO ADMISSION: The patient lived with  but did not require assist. Pt's  has dementia and pt is the caregiver. Physical Therapy Goals  Initiated 11/24/2021  1. Patient will move from supine to sit and sit to supine , scoot up and down, and roll side to side in bed with modified independence within 7 day(s). 2.  Patient will transfer from bed to chair and chair to bed with supervision/set-up using the least restrictive device within 7 day(s). 3.  Patient will perform sit to stand with supervision/set-up within 7 day(s). 4.  Patient will ambulate with moderate assist for 5 feet with the least restrictive device within 7 day(s).          Outcome: Progressing Towards Goal   PHYSICAL THERAPY REEVALUATION  Patient: Yue Montes (45 y.o. female)  Date: 12/3/2021  Primary Diagnosis: Bimalleolar fracture of left ankle [S82.842A]  Procedure(s) (LRB):  REMOVAL OF LEFT LOWER EXTREMITY, EXTERNAL FIXATOR OPEN REDUCTION INTERNAL FIXATION LEFT TRIMALLEOLAR ANKLE FRACTURE (Left) 1 Day Post-Op   Precautions:   NWB left LE      ASSESSMENT  Based on the objective data described below, the patient presents with difficulty with ambulation. Communicated with nurse cleared for therapy. Patient remain NWB s/p removal of external fixator supine on bed when received. Reviewed precautions with the patient verbalized understanding. Rolled on the edge of bed SBA, supine to sit SBA, sit to stand CGA, ambulate towards bedside commode CGA no loss of balance steady sitting and standing. Patient need to use the bedside commode and will need more time notified nurse who agreed to monitor and assist back to the chair after. Current Level of Function Impacting Discharge (mobility/balance): CGA with transfers and ambulation using a rolling walker. Functional Outcome Measure: The patient scored 13/28 on the tinetti outcome measure. Other factors to consider for discharge: NWB on the left LE     Patient will benefit from skilled therapy intervention to address the above noted impairments. PLAN :  Recommendations and Planned Interventions: bed mobility training, transfer training, gait training, therapeutic exercises, neuromuscular re-education, orthotic/prosthetic training, patient and family training/education, and therapeutic activities      Frequency/Duration: Patient will be followed by physical therapy:  daily to address goals. Recommendation for discharge: (in order for the patient to meet his/her long term goals)  Therapy up to 5 days/week in SNF setting    This discharge recommendation:  Has been made in collaboration with the attending provider and/or case management    Equipment recommendations for successful discharge (if) home: patient owns DME required for discharge         SUBJECTIVE:   Patient stated [address].     OBJECTIVE DATA SUMMARY:   HISTORY:    Past Medical History:   Diagnosis Date    Arthritis     Gastrointestinal disorder     gerd    GERD (gastroesophageal reflux disease)     Ill-defined condition     cholesterol     Past Surgical History:   Procedure Laterality Date    COLONOSCOPY N/A 1/4/2019 Dr. Suzette Jefferson, no repeat recommended    HX BREAST BIOPSY  10-15 yrs ago    neg path, unsure which breast    HX COLONOSCOPY  01/01/2012    approx date, normal    HX ENDOSCOPY      no ulcers    HX ORTHOPAEDIC      right shoulder    HX [address] course since last seen and reason for reevaluation: removal of external fixator now cleared for therapy    Personal factors and/or comorbidities impacting plan of care:     Home Situation  Home Environment: Private residence  # Steps to Enter: 4  Rails to Enter: Yes  Hand Rails : Bilateral (wide)  One/Two Story Residence: Other (Comment) (split level)  Living Alone: No  Support Systems: Spouse/Significant Other ( has dementia)  Patient Expects to be Discharged to[de-identified] Skilled nursing facility  Current DME Used/Available at Home: Teresa Haywood, [place], [address] chair, Grab bars  Tub or Shower Type: Shower    EXAMINATION/PRESENTATION/DECISION MAKING:   Critical Behavior:  Neurologic State: Alert, Appropriate for age  Orientation Level: Oriented X4  Cognition: Appropriate decision making, Appropriate for age attention/concentration, Appropriate safety awareness, Follows commands  Safety/Judgement: Awareness of environment, Fall prevention, Insight into deficits  Hearing:   Auditory  Auditory Impairment: Hard of hearing, right side  Hearing Aids/Status: Right    Range Of Motion:  AROM: Generally decreased, functional           PROM: Generally decreased, functional           Strength:    Strength: Generally decreased, functional                    Tone & Sensation:                                  Coordination:  Coordination: Generally decreased, functional  Vision:      Functional Mobility:  Bed Mobility:  Rolling: Stand-by assistance  Supine to Sit: Stand-by assistance  Sit to Supine: Stand-by assistance  Scooting: Stand-by assistance  Transfers:  Sit to Stand: Contact guard assistance  Stand to Sit: Contact guard assistance  Stand Pivot Transfers: Contact guard assistance     Bed to Chair: Contact guard assistance              Balance:   Sitting: Intact  Standing: Impaired; With support  Standing - Static: Fair  Standing - Dynamic : Fair  Ambulation/Gait Training:  Distance (ft): 5 Feet (ft)  Assistive Device: Walker, rolling; Gait belt  Ambulation - Level of Assistance: Contact guard assistance     Gait Description (WDL): Exceptions to WDL  Gait Abnormalities: Antalgic; Path deviations     Left Side Weight Bearing: Non-weight bearing  Base of Support: Widened     Speed/Linette: Fluctuations; Slow  Step Length: Right shortened; Left shortened                      Therapeutic Exercises:   Educate and instructed patient to continue active range of motion exercise on both legs while up on chair or on bed multiple times. Recommend patient to be up on the chair at least 3 times a day every meal times as tolerated. Functional Measure:  Tinetti test:    Sitting Balance: 1  Arises: 1  Attempts to Rise: 2  Immediate Standing Balance: 1  Standing Balance: 1  Nudged: 0  Eyes Closed: 0  Turn 360 Degrees - Continuous/Discontinuous: 0  Turn 360 Degrees - Steady/Unsteady: 0  Sitting Down: 2  Balance Score: 8 Balance total score  Indication of Gait: 1  R Step Length/Height: 1  L Step Length/Height: 0  R Foot Clearance: 1  L Foot Clearance: 0  Step Symmetry: 0  Step Continuity: 0  Path: 1  Trunk: 1  Walking Time: 0  Gait Score: 5 Gait total score  Total Score: 13/28 Overall total score         Tinetti Tool Score Risk of Falls  <19 = High Fall Risk  19-24 = Moderate Fall Risk  25-28 = Low Fall Risk  Tinetti ME. Performance-Oriented Assessment of Mobility Problems in Elderly Patients. [facility] 66; [id number].  (Scoring Description: PT Bulletin Feb. 10, 1993)    Older adults: Juan R Gonzalez et al, 2009; n = [address] [employer] elderly evaluated with ABC, KERRY, ADL, and IADL)  · Mean KERRY score for males aged 69-68 years = 26.21(3.40)  · Mean KERRY score for females age 69-68 years = 25.16(4.30)  · Mean KERRY score for males over 80 years = 23.29(6.02)  · Mean KERRY score for females over 80 years = 17.20(8.32)             Pain Ratin/10    Activity Tolerance:   Good    After treatment patient left in no apparent distress:   Sitting in chair, Heels elevated for pressure relief, Call bell within reach, and Bed / chair alarm activated    COMMUNICATION/EDUCATION:   The patients plan of care was discussed with: Registered nurse. Fall prevention education was provided and the patient/caregiver indicated understanding. Thank you for this referral.  Huston Siemens, PT,WCC.    Time Calculation: 28 mins

## 2021-12-03 NOTE — PROGRESS NOTES
Patient going by ambulance now. Report given. Answered all questions. Left time for clarification. Patient safely transferred to [facility]. No needs.

## 2021-12-03 NOTE — PROGRESS NOTES
Monitored post op. PO2 was 89% administered 2L of O2 pt is A&Ox4, no s/s of distress.  Weaned Q hour until room air Patient: Radha Knox Date: 2/18/2019     Employee's Work Status/Instructions: May return to work with restrictions: 3/5/2019    May return to work with the following special restrictions: DESK WORK ONLY UNTIL FURTHER CLEARANCE - 8 hr shifts to start     IF THE EMPLOYER IS UNABLE TO ACCOMODATE THESE RESTRICTIONS, INDIVIDUAL SHOULD NOT WORK     Follow-Up Appointment: 3 wks    If you cannot keep your appointment, kindly notify us at least 24 hours in advance. I have had questions answered about my condition/treatment plan answered to my satisfaction. I understand that if my condition worsens or new symptoms appear, I should contact the office. If I need to speak to someone about my condition and I am unable to  contact the office, I will call or report to Emergency Department.        Patient Signature:     Physician Signature: Hemal Kohler PA-C

## 2021-12-03 NOTE — PROGRESS NOTES
Physician Progress Note      PATIENT:               Pam DELANEY #:                  [id number]  :                       1935  ADMIT DATE:       2021 5:05 AM  DISCH DATE:  RESPONDING  PROVIDER #:        Aline Baker MD        QUERY TEXT:    Type of Anemia: Please provide further specificity, if known. Clinical indicators include: anemia, iron, ferritin, hg, hgb, hct  Options provided:  -- Anemia due to acute blood loss  -- Anemia due to chronic blood loss  -- Anemia due to iron deficiency  -- Anemia due to postoperative blood loss  -- Anemia due to chronic disease  -- Other - I will add my own diagnosis  -- Disagree - Not applicable / Not valid  -- Disagree - Clinically Unable to determine / Unknown        PROVIDER RESPONSE TEXT:    Provider dismissed this query because it was not applicable to the patient or not a valid query.   not currently anemic      Electronically signed by:  Aline Baker MD 12/3/2021 4:50 PM

## 2021-12-03 NOTE — PROGRESS NOTES
Transition of Care Plan to SNF/Rehab    SNF/Rehab Transition:  Patient has been accepted to [facility] and meets criteria for admission. Patient will transported by [facility] and expected to leave at 16:00. Communication to Patient/Family:  Met with patient and they are agreeable to the transition plan. Communication to SNF/Rehab:  Bedside RN, Evelyn Castillo, has been notified to update the transition plan to the facility and call report (phone number [phone], request [facility], Bed 502). Discharge information has been updated on the AVS.     Discharge instructions to be sent to faxed to facility [phone]. Nursing Please include all hard scripts for controlled substances, med rec and dc summary, and AVS in packet. Reviewed and confirmed with facility, [facility], can manage the patient care needs for the following:     SNF/Rehab Transition:    Reviewed and confirmed with facility, [facility], they can manage the patient care needs for the following:     Sohan Hendrickson with (X) only those applicable:    Medication:  [x]  Medications will be available at the facility  []  IV Antibiotics  []  Controlled Substance - hard copy to be sent with patient   [x]  Weekly Labs   Documents:  [x] Hard RX  [x] MAR  [] Kardex  [x] AVS  []Transfer Summary  [x]Discharge   Equipment:  []  CPAP/BiPAP  []  Wound Vacuum  []  [place] or Urinary Device  []  PICC/Central Line  []  Nebulizer   []  Ventilator   Treatment:  []Isolation (for MRSA, VRE, etc.)  []Surgical Drain Management  []Tracheostomy Care  []Dressing Changes  []Dialysis with transportation and chair time. []PEG Care  []Oxygen  []Daily Weights for Heart Failure   Dietary:  []Any diet limitations  []Tube Feedings   []Total Parenteral Management (TPN)   Eligible for Medicaid Long Term Services and Supports  Yes:  [] Eligible for medical assistance or will become eligible within 180 days and UAI completed.    [] Provider/Patient and/or support system has requested screening. [] UAI copy provided to patient or responsible party,   [] UAI unavailable at discharge will send once processed to SNF provider. [] UAI unavailable at discharged mailed to patient  No:   [] Private pay and is not financially eligible for Medicaid within the next 180 days. [] Reside out-of-state.   [] A residents of a state owned/operated facility that is licensed  by [address] [employer] [facility] or [facility]  [] Enrollment in [facility] hospice services  [] [address] [place] [address] [facility] [place]  [] Patient /Family declines to have screening completed or provide financial information for screening     Financial Resources:  Medicaid    [] Initiated and application pending   [] Full coverage     Advanced Care Plan:   []Surrogate Decision Maker of Care  []POA  [x]Communicated Code Status - FULL   Other           Brian Smith RN

## 2021-12-03 NOTE — ANESTHESIA PROCEDURE NOTES
Peripheral Block    Start time: 12/2/2021 7:00 PM  End time: 12/2/2021 7:10 PM  Performed by: Eloina Tariq MD  Authorized by: Eloina Tariq MD       Pre-procedure:    Indications: at surgeon's request and post-op pain management    Preanesthetic Checklist: patient identified, risks and benefits discussed, site marked, timeout performed, anesthesia consent given and patient being monitored    Timeout Time: 19:00 EST          Block Type:   Block Type:  Popliteal  Laterality:  Left  Monitoring:  Standard ASA monitoring, continuous pulse ox, frequent vital sign checks, heart rate, responsive to questions and oxygen  Injection Technique:  Continuous  Procedures: ultrasound guided    Patient Position: supine  Prep: chlorhexidine    Needle Gauge:  18 G  Needle Localization:  Ultrasound guidance  Medication Injected:  FentaNYL citrate (PF) injection sedation initial, 50 mcg  ropivacaine (PF) (NAROPIN)(0.5%) 5 mg/mL injection, 20 mL  Med Admin Time: 12/2/2021 7:10 PM    Assessment:  Number of attempts:  1  Injection Assessment:  Incremental injection every 5 mL, no paresthesia, ultrasound image on chart, local visualized surrounding nerve on ultrasound, negative aspiration for blood and no intravascular symptoms  Patient tolerance:  Patient tolerated the procedure well with no immediate complications

## 2021-12-03 NOTE — ROUTINE PROCESS
TRANSFER - OUT REPORT:    Verbal report given to [facility] RN(name) on Wilmar Arabia  being transferred to 4th floor(unit) for routine post - op       Report consisted of patients Situation, Background, Assessment and   Recommendations(SBAR). Information from the following report(s) SBAR, OR Summary, Intake/Output and MAR was reviewed with the receiving nurse. Lines:   Peripheral IV 11/23/21 Distal; Left; Posterior Forearm (Active)   Site Assessment Clean, dry, & intact 12/02/21 2110   Phlebitis Assessment 0 12/02/21 2110   Infiltration Assessment 0 12/02/21 2110   Dressing Status Clean, dry, & intact 12/02/21 2110   Dressing Type Tape; Transparent 12/02/21 2110   Hub Color/Line Status Pink; Infusing 12/02/21 2110   Action Taken Open ports on tubing capped 12/02/21 1716   Alcohol Cap Used Yes 12/02/21 2110        Opportunity for questions and clarification was provided.       Patient transported with:   Registered Nurse

## 2021-12-03 NOTE — PROGRESS NOTES
Nutrition Assessment     Type and Reason for Visit: Reassess    Nutrition Recommendations/Plan:   1. Continue Regular diet  2. Cotinue Ensure HP BID for wound healing    Nutrition Assessment:    12/3: Follow up. Patient endorses good PO intake, per documentation averaging 50-75% of meals. Reports consuming 100% of Ensures. Provided meal preferences. No complaints at this time. 11/29: Pt Bimalleolar fracture of left ankle [S82.712Z] Pt  has a past medical history of Arthritis, Gastrointestinal disorder, GERD (gastroesophageal reflux disease), HLD. Pt screened for LOS. Pt had GLF with left ankle fracture. Followed by ortho - pt is POD 6 for left ankle washout with external fixation. Pt was active prior to this. No wt changes PTA. PO is currently good with intakes >75% on average. Pt now likely scheduled for second surgery on 12/2 per IDR. Added Ensure HP BID for increased protein needs pt needs from first surgery and will need for wound healing. No reported n/v, c/d. NKFA. No chew/swallow difficulties PTA. Monitor next surgery and wound healing. BG mostly controlled. No A1c. Other labs reviewed.       Wt Readings from Last 30 Encounters:   12/03/21 65.2 kg (143 lb 11.8 oz)   10/12/21 66.7 kg (147 lb 1.6 oz)   10/06/20 67.1 kg (147 lb 14.4 oz)   09/15/20 68 kg (150 lb)   06/25/19 68.4 kg (150 lb 12.8 oz)   01/04/19 68 kg (150 lb)   12/18/18 68 kg (150 lb)   06/21/18 69.9 kg (154 lb)   11/28/17 71.6 kg (157 lb 12.8 oz)   10/31/17 69.8 kg (153 lb 14.4 oz)   10/17/17 70.3 kg (155 lb)   04/18/17 71.1 kg (156 lb 12.8 oz)   12/09/16 72.1 kg (159 lb)   10/31/16 72.4 kg (159 lb 9.8 oz)      Malnutrition Assessment:  Malnutrition Status:  No malnutrition    Context:  Acute illness     Findings of the 6 clinical characteristics of malnutrition:   Energy Intake:  No significant decrease in energy intake  Weight Loss:  No significant weight loss     Body Fat Loss:  No significant body fat loss,     Muscle Mass Loss:  No significant muscle mass loss,    Fluid Accumulation:  No significant fluid accumulation,     Strength:  Not performed    Estimated Daily Nutrient Needs:  Energy (kcal):  3424-8410 (25-30)  Protein (g):  65-78 (1.0-1.2)       Fluid (ml/day):  0598-7154    Nutrition Related Findings:    ABD:  WNL with active bowel sounds 12/2  Last BM: 12/1, loose  Edema: None noted 12/3    Nutr. Labs:  Lab Results   Component Value Date/Time    GFR est AA >60 12/01/2021 06:13 AM    GFR est non-AA >60 12/01/2021 06:13 AM    Creatinine (POC) 0.94 10/12/2021 01:14 PM    Creatinine 0.74 12/01/2021 06:13 AM    BUN 13 12/01/2021 06:13 AM    BUN (POC) 25 (H) 10/06/2020 03:01 PM    Sodium (POC) 138 10/12/2021 01:14 PM    Sodium 138 12/01/2021 06:13 AM    Potassium 3.8 12/01/2021 06:13 AM    Potassium (POC) 4.3 10/12/2021 01:14 PM    Chloride (POC) 103 10/12/2021 01:14 PM    Chloride 106 12/01/2021 06:13 AM    CO2 28 12/01/2021 06:13 AM     Lab Results   Component Value Date/Time    Glucose 96 12/01/2021 06:13 AM    Glucose (POC) 99 11/24/2021 04:28 PM     No results found for: HBA1C, FUE5VYST, QMH3IOZH, CJZ5KNML    Nutr.  Meds:  Dulcolax  Os-Ca-Vit D3  Lovenox  Lactated Ringers  Remeron  Protonix  Miralax PRN  Senna  Pericolace    Current Nutrition Therapies:  ADULT ORAL NUTRITION SUPPLEMENT PM Snack, Breakfast; Low Calorie/High Protein  ADULT DIET Regular    Anthropometric Measures:  · Height:  5' 2.01\" (157.5 cm)  · Current Body Wt:  65.2 kg (143 lb 11.8 oz)  · BMI: 26.3    Nutrition Diagnosis:   · Increased nutrient needs related to acute injury/trauma as evidenced by  (s/p surgery)    Nutrition Intervention:  Food and/or Nutrient Delivery: Continue current diet, Continue oral nutrition supplement  Nutrition Education and Counseling: No recommendations at this time  Coordination of Nutrition Care: Continue to monitor while inpatient, Interdisciplinary rounds    Goals:  PO intake >/= 75% of all meals within 5 - 7 days       Nutrition Monitoring and Evaluation:   Behavioral-Environmental Outcomes: None identified  Food/Nutrient Intake Outcomes: Food and nutrient intake, Supplement intake  Physical Signs/Symptoms Outcomes: Biochemical data, Weight, Skin    Discharge Planning:    Continue oral nutrition supplement     Electronically signed by Karli Chaudhry RD on 11/3/2739  [phone] or via [employer]

## 2021-12-03 NOTE — PROGRESS NOTES
Report called to Evelin [address] to [place]. Answered all questions. Left time for clarification. No needs at this time. Waiting on ambulance to arrive. IV to be removed soon.

## 2021-12-03 NOTE — DISCHARGE INSTRUCTIONS
HOSPITALIST DISCHARGE INSTRUCTIONS  NAME: Michelle Pérez   :  1935   MRN:  [id number]     Date/Time:  12/3/2021 11:07 AM    ADMIT DATE: 2021     DISCHARGE DATE: 12/3/2021     PRINCIPAL DISCHARGE DIAGNOSES:  Ankle fracture    MEDICATIONS:  · It is important that you take the medication exactly as they are prescribed. Note the changes and additions to your medications. Be sure you understand these changes before you are discharged today. · Keep your medication in the bottles provided by the pharmacist and keep a list of the medication names, dosages, and times to be taken in your wallet. · Do not take other medications without consulting your doctor. Pain Management: per above medications    What to do at Home    Recommended diet:  Resume previous diet    Recommended activity: PT/OT Eval and Treat    If you experience any of the following symptoms then please call your primary care physician or return to the emergency room if you cannot get hold of your doctor:  Severe foot/ankle pain, swelling, redness, drainage, fever, chills, severe chest pain, shortness of breath, or other severe concerning symptoms    Follow Up: Follow-up Information     Follow up With Specialties Details Why [facility] [address]. [address] [facility]    Priyanka Galeano MD Family Medicine   [facility]3 [facility] [id number]      William Strauss PA-C Physician Assistant In 1 week  [address] [address] [phone]              Information obtained by :  I understand that if any problems occur once I am at home I am to contact my physician. I understand and acknowledge receipt of the instructions indicated above. Physician's or R.N.'s Signature                                                                  Date/Time                                                                                                                                              Patient or Representative Signature                                                          Date/Time          Bertha Torrez MD  Foot and Ankle Surgery  General Orthopaedics      POST-OP Instructions    BLOOD CLOTS: To prevent blood clot formation, you have been prescribed aspirin 81mg tablets to be taken by mouth twice daily for 6 weeks. PAIN CONTROL: For pain control, you have been prescribed narcotic medication (Oxycodone or Norco). Take as prescribed and wean as able. ***VERY IMPORTANT - PLEASE READ***. If you were given a nerve block by the anesthesia team to numb your leg, you must start taking pain medication BEFORE the block wears off EVEN IF YOU ARE NOT HAVING PAIN. If you do not start taking pain medication before the block wears off, you will be behind on pain control. The block will usually wear off in 12-24 hours after surgery, so you must have pain medication in your system before the block wears off. While keeping the splint intact, staff at the facility may remove the lateral left knee dressing and pull the nerve catheter once the container has no more medicine. Take pain medication with food if possible to minimize stomach irritation. You may take tylenol (acetaminophen) with Oxycodone but not with medications that already have acetaminophen in them such as Norco. While taking narcotics, you may have constipation. A stool softener is recommended-- you may use an over-the-counter stool softener or use the one that has been prescribed for you. We recommend no ibuprofen (Advil, Motrin, etc) since there are some studies that show it may interfere with bone healing. Do not drink alcohol or drive while using narcotic pain medication.     *** VERY IMPORTANT - PLEASE READ*** Please monitor the supply of your pain medicine closely and if it looks like you're going to run out of pain medicine over the weekend please call the office on [address] prior to the weekend to request a refill. The on call physician for nights and weekends CANNOT refill pain medicine. You will either have to wait until Monday morning when the office re-opens or you will have to go to an urgent care/ emergency room if you are in need of pain medicine. NAUSEA/VOMITING: Sometimes after surgery, patients have nausea or vomiting. A medicine has been prescribed for this. If you do not have nausea or vomiting, you do not need to have this prescription filled. HOME MEDICATIONS: Resume medications you were taking prior to surgery unless you have been counseled to discontinue them. POST-OPERATIVE INSTRUCTIONS:    ACTIVITY: Please elevate your operative extremity above the level of your heart for the first 4 days as much as possible after surgery (a good way to remember this is (toes above nose). Moving around and walking (with crutches) helps decrease the risk of blood clots. While you are sleeping and when you are not being active, continue to keep your leg elevated as much as possible. It is common to have swelling in your feet after surgery for even a year afterwards, so the more you keep the leg elevated after surgery, the less swelling you will have later on. WEIGHTBEARING: You are to be NON- weight-bearing to your operative leg. GENERAL INSTRUCTIONS:    1. Be alert for signs of infection including redness, streaking, odor, fever or chills. Be alert for excessive pain or bleeding and notify your surgeon immediately.   2. Notify Provider of nausea, vomiting, or chills, numbness or weakness, bleeding, redness, swelling, pain, or drainage from surgical incision(s), bowel or bladder dysfunction, severe pain not relieved by pain medications, temperature greater than 101.0 F (38.3 C) degrees  3. If you smoke (or have smoked within the last year), we strongly recommend that you do not smoke. DIET AND COMFORT: Return to your regular diet as tolerated. Begin with light or bland foods. Drink plenty of fluids. DRESSING CARE:  1. Leave your dressing/cast/splint in place until your post operative visit. Keep it clean and dry. Mild to moderate blood or drainage after surgery is expected. 2. Keep your operative extremity elevated. Avoid pressure under the heel by placing pillows under your calf, not your foot. FOLLOWUP INSTRUCTIONS:    1. Follow up in clinic with your surgeon in about 7 days. If your appointment has not already been made at the time of discharge, you will need to call to make the appointment. 2. The general scheduling number is [phone]  3. Contact your physicians office during office hours. For medical emergencies call [phone].

## 2021-12-03 NOTE — DISCHARGE SUMMARY
Physician Discharge Summary     Patient ID:  Etienne More  [id number]  80 y.o.  1935    Admit date: 11/23/2021    Discharge date: 12/3/2021    Admission Diagnoses: Bimalleolar fracture of left ankle [S82.842A]    Principal Discharge Diagnoses: Ankle fracture    OTHER PROBLEMS ADDRESSEDS  Principal Diagnosis Bimalleolar fracture of left ankle                                            Principal Problem:    Bimalleolar fracture of left ankle (11/23/2021)    Active Problems:    Pure hypercholesterolemia (12/9/2016)      Gastroesophageal reflux disease without esophagitis (12/9/2016)      Chronic bilateral low back pain without sciatica (12/9/2016)      Subclinical hypothyroidism (6/21/2018)       Patient Active Problem List   Diagnosis Code    Right arm and right face tingling R20.2    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 Melita Barthel is an 81 yo F w/ hx of GERD admitted for L open ankle fx s/p closed reduction 11/23.      Open bimalleolar L ankle fracture:  Fx POA, due from mechanical GLF fall. S/p closed reduction on 11/23, and later ORIF on 12/2. Cleared by ortho for discharge to SNF. Recommended ASA 81mg BID for DVT ppx. On PPI    Anemia: low iron, normal ferritin. Hg normalized post-op, will likely drop again post-op     Hypercholesterolemia: Not on meds,  and trig 134 and HDL 58 on panel.  Agree no statin is needed considering her age and RFs     Insomnia: PRN Remeron     GERD: cont PPI     Constipation: resolved with Miralax       Pt discharged in improved and stable condition. Procedures performed: see above    Imaging studies: see above  XR ANKLE LT MIN 3 V    Result Date: 11/23/2021  Bimalleolar fracture. Widening of the medial ankle mortise. Soft tissue gas and soft tissue swelling. XR CHEST PORT    Result Date: 11/23/2021  Mild nonspecific interstitial lung disease. No pneumothorax. Consider PA and lateral chest views when the patient can better tolerate. NC XR TECHNOLOGIST SERVICE    Result Date: 12/2/2021  Fluoroscopic imaging during procedure. REPORT PROVIDED FOR COMPLIANCE ONLY AT NO CHARGE. NC XR TECHNOLOGIST SERVICE    Result Date: 11/23/2021  1. External fixation . REPORT PROVIDED FOR COMPLIANCE ONLY AT NO CHARGE           PCP: Kimo Thao MD    Consults: Orthopedic Surgery    Discharge Exam:  Visit Vitals  /77   Pulse 76   Temp 98 °F (36.7 °C)   Resp 18   Ht 5' 2.01\" (1.575 m)   Wt 65.2 kg (143 lb 11.8 oz)   SpO2 96%   BMI 26.28 kg/m²     GEN: NAD  CV: RRR  RESP: CTAB  MSK: dressing C/D/I    Disposition: SNF    Patient Instructions:   Current Discharge Medication List      START taking these medications    Details   senna-docusate (PERICOLACE) 8.6-50 mg per tablet Take 1 Tablet by mouth two (2) times a day. Do not take if loose stools  Qty: 30 Tablet, Refills: 0  Start date: 12/3/2021      traMADoL (ULTRAM) 50 mg tablet Take 1 Tablet by mouth every six (6) hours as needed for Pain for up to 3 days. Max Daily Amount: 200 mg. Qty: 20 Tablet, Refills: 0  Start date: 12/3/2021, End date: 12/6/2021    Associated Diagnoses: Type I or II open bimalleolar fracture of left ankle, initial encounter      aspirin 81 mg cap Take 81 mg by mouth two (2) times a day for 42 days.  TAKE FOR 6 WEEKS FOR DVT PROPHYLAXIS PER ORTHOPEDICS  Qty: 80 Each, Refills: 0  Start date: 12/3/2021, End date: 1/14/2022         CONTINUE these medications which have NOT CHANGED    Details   acyclovir (ZOVIRAX) 400 mg tablet TAKE 1 TABLET BY MOUTH 2 TIMES A DAY TO PREVENT COLD SORES  Qty: 180 Tab, Refills: 3      diclofenac (VOLTAREN) 1 % gel Apply 1 g to affected area four (4) times daily. Qty: 50 g, Refills: 2      mirtazapine (REMERON) 7.5 mg tablet Take 1 Tab by mouth nightly. Qty: 30 Tab, Refills: 1      ibuprofen (MOTRIN) 600 mg tablet TAKE ONE TABLET BY MOUTH EVERY SIX HOURS AS NEEDED for pain  Qty: 120 Tab, Refills: 0      Biotin 2,500 mcg cap Take 2,500 mcg by mouth daily. Calcium-Cholecalciferol, D3, 500 mg(1,250mg) -125 unit tab 1 per day      multivitamin (ONE A DAY) tablet Take 1 Tablet by mouth daily. omeprazole (PRILOSEC) 20 mg capsule Take 1 Cap by mouth daily. Qty: 90 Cap, Refills: 3      DOCOSAHEXANOIC ACID/EPA (FISH OIL PO) Take 2 Capsules by mouth daily. red yeast rice extract 600 mg cap Take 600 mg by mouth two (2) times a day. Activity: See discharge instructions  Diet: See discharge instructions  Wound Care: See discharge instructions    Follow-up Information     Follow up With Specialties Details Why [address]  0198 [facility] Riki Peña MD Family Medicine   1263 [facility] [address] [address]      Mary Denney PA-C Physician Assistant In 1 week  [address] [address] [phone]            I spent 35 minutes on this discharge. Signed:  Paddy Grossman MD  12/3/2021  11:39 AM    CC: PCP Dr. Gloria Crowder   .

## 2021-12-03 NOTE — PROGRESS NOTES
ORTHO PROGRESS NOTE      SUBJECTIVE:  Wilmar Arabia states she has no pain left ankle and is numb from indwelling popliteal catheter. OBJECTIVE:  Patient Vitals for the past 24 hrs:   Temp Pulse BP   12/03/21 0804 97.2 °F (36.2 °C) 76 115/70   12/03/21 0447 98 °F (36.7 °C) 75 125/68   12/03/21 0153 97.6 °F (36.4 °C) 74 (!) 106/57   12/02/21 2330 97.9 °F (36.6 °C) 68 (!) 128/51   12/02/21 2250 -- 71 (!) 115/57   12/02/21 2246 -- 72 (!) 121/55   12/02/21 2244 -- 69 121/68   12/02/21 2231 97.9 °F (36.6 °C) 78 126/61   12/02/21 2155 -- 78 (!) 126/55   12/02/21 2150 -- 78 (!) 125/50   12/02/21 2145 -- 79 (!) 126/53   12/02/21 2140 -- 78 (!) 132/55   12/02/21 2135 -- 81 (!) 132/59   12/02/21 2130 -- 80 139/79   12/02/21 2125 -- 81 138/60   12/02/21 2120 -- 82 (!) 140/64   12/02/21 2115 -- 87 128/73   12/02/21 2114 98.2 °F (36.8 °C) 88 (!) 140/64   12/02/21 2110 -- 86 (!) 146/54   12/02/21 1858 -- 79 124/62   12/02/21 1658 98.1 °F (36.7 °C) 68 120/65       Alert, no distress. Lying in bed. Family (daughter) present. Respirations unlabored. Dressing/splint CDI. Toes well perfused CR < 2 secs but decreased sens and no motor d/t regional nerve catheter. Calf NTTP bilat. Moves RLE OK. Recent Labs     12/01/21  0613   HGB 12.3   HCT 38.5      BUN 13   CREA 0.74   GFRAA >60   GFRNA >60       PT/OT:   Gait:  Gait  Gait Abnormalities: Antalgic, Decreased step clearance  Ambulation - Level of Assistance: Minimal assistance  Distance (ft): 6 Feet (ft) (3x2)  Assistive Device: Walker, rolling, Gait belt                 ASSESSMENT:  Procedure: Procedure(s):  REMOVAL OF LEFT LOWER EXTREMITY, EXTERNAL FIXATOR OPEN REDUCTION INTERNAL FIXATION LEFT TRIMALLEOLAR ANKLE FRACTURE  Post Op day: 1 Day Post-Op    PLAN:  Ice/elevation. PT/OT: NWB LLE with device  Analgesics: popliteal catheter. Tylenol. Narcotics prn. DVT proph: Lovenox as inpatient. ASA 81 mg BID as outpatient min 6 weeks. Disp planning.  To SNF today.  F/U: Dr. Devendra Herrera office 1 week.     DEANNA Villavicencio  Orthopedic Trauma Service  [facility]

## 2021-12-03 NOTE — ANESTHESIA POSTPROCEDURE EVALUATION
Procedure(s):  REMOVAL OF LEFT LOWER EXTREMITY, EXTERNAL FIXATOR OPEN REDUCTION INTERNAL FIXATION LEFT TRIMALLEOLAR ANKLE FRACTURE.    regional, general    Anesthesia Post Evaluation        Patient location during evaluation: PACU  Patient participation: complete - patient participated  Level of consciousness: sleepy but conscious  Pain management: adequate  Airway patency: patent  Anesthetic complications: no  Cardiovascular status: acceptable and stable  Respiratory status: acceptable and unassisted  Hydration status: acceptable  Comments: The patient was seen and evaluated in the post-operative period. The time of my evaluation may not match the time of this note. The patient denied uncontrolled pain or nausea, and there were no significant complications evident. Justyn Gonzalez MD      Post anesthesia nausea and vomiting:  none  Final Post Anesthesia Temperature Assessment:  Normothermia (36.0-37.5 degrees C)      INITIAL Post-op Vital signs:   Vitals Value Taken Time   /73 12/02/21 2115   Temp 36.8 °C (98.2 °F) 12/02/21 2114   Pulse 80 12/02/21 2119   Resp 9 12/02/21 2119   SpO2 98 % 12/02/21 2119   Vitals shown include unvalidated device data.

## 2021-12-09 ENCOUNTER — PATIENT OUTREACH (OUTPATIENT)
Dept: CASE MANAGEMENT | Age: 86
End: 2021-12-09

## 2021-12-09 NOTE — PROGRESS NOTES
Post Acute Facility Update     *The information contained in this note was received during a weekly care coordination call with the post acute facility*    Current Facility: [facility] ([facility])  Anticipated Discharge Date: TBD    Facility Nursing Update: no current nursing update  Facility Social Work Update: home alone with very good family support, have a plan for possible assisted living for respite,  ankle francture  Bundle Program Status: none  Upper Extremity Assistance: Stand by Assist - Presence and Cueing  Lower Extremity Assistance:  Moderate Assistance   Bed Mobility: Independent  Transfers: Contact Guard Assist - hands on patient for balance   Ambulation: Minimal Assistance   How far (in feet) is the patient ambulating? 52 ft  Device Used: walker  Barriers to Discharge: TBELENI Vela LPN Care Coordinator

## 2021-12-16 ENCOUNTER — PATIENT OUTREACH (OUTPATIENT)
Dept: CASE MANAGEMENT | Age: 86
End: 2021-12-16

## 2021-12-23 ENCOUNTER — PATIENT OUTREACH (OUTPATIENT)
Dept: CASE MANAGEMENT | Age: 86
End: 2021-12-23

## 2021-12-27 NOTE — PROGRESS NOTES
Post Acute Facility Update     *The information contained in this note was received during a weekly care coordination call with the post acute facility*    Current Facility: [facility] ([facility])  Anticipated Discharge Date: TBD    Facility Nursing Update:  ST evaluated 12/15 for cognition   Facility Social Work Update:  Pt lives home alone. Has supportive family. Plan for possible assisted living for respite prior to returning home. Bundle Program Status: none  Upper Extremity Assistance: Stand by Assist - Presence and Cueing  Lower Extremity Assistance: Contact Guard Assist - hands on patient for balance   Bed Mobility: Stand by Assist - Presence and Cueing  Transfers: Contact Guard Assist - hands on patient for balance   Ambulation: Contact Guard Assist - hands on patient for balance   How far (in feet) is the patient ambulating?  25  Device Used: walker  Barriers to Discharge: TBD  Other: increased SOB with ambulation      Vilma SAABN, RN  [facility]

## 2021-12-30 ENCOUNTER — PATIENT OUTREACH (OUTPATIENT)
Dept: CASE MANAGEMENT | Age: 86
End: 2021-12-30

## 2021-12-30 NOTE — PROGRESS NOTES
Post Acute Facility Update     *The information contained in this note was received during a weekly care coordination call with the post acute facility*    Current Facility: [facility] ([facility])  Anticipated Discharge Date: 1/6/2022    Facility Nursing Update: Doing great medically, discharging 1/6/2022  Facility Social Work Update: no current social work update. Bundle Program Status: none  Upper Extremity Assistance: Independent  Lower Extremity Assistance: Independent  Bed Mobility: Independent  Transfers: Stand by Assist - Presence and Cueing  Ambulation: Contact Guard Assist - hands on patient for balance   How far (in feet) is the patient ambulating?  30 ft   Device Used: walker   Barriers to Discharge: HANK Vela LPN Care Coordinator

## 2022-01-06 ENCOUNTER — PATIENT OUTREACH (OUTPATIENT)
Dept: CASE MANAGEMENT | Age: 87
End: 2022-01-06

## 2022-01-06 NOTE — PROGRESS NOTES
[address] [place] [address] Dr Discharge Note    *The information contained in this note was received during a weekly care coordination call with the post acute facility*      Patient was discharged from [id number] [place] [address] ([facility]) on 1/6/2022 to [facility]. PCP: MD KENDELL Butcher appointment: No future appointments. Home Health/Outpatient orders at discharge: home health care  [address]: in-house therapy    [facility] ordered at discharge: none  [address] received prior to discharge: [phone] Team will sign off at this time. Medications were not reconciled and general patient assessment was not completed during this skilled nursing facility outreach.        Luann Blas LPN Care Coordinator

## 2022-01-17 ENCOUNTER — HOME HEALTH ADMISSION (OUTPATIENT)
Dept: HOME HEALTH SERVICES | Facility: HOME HEALTH | Age: 87
End: 2022-01-17
Payer: MEDICARE

## 2022-01-29 ENCOUNTER — HOME CARE VISIT (OUTPATIENT)
Dept: SCHEDULING | Facility: HOME HEALTH | Age: 87
End: 2022-01-29
Payer: MEDICARE

## 2022-01-29 VITALS
TEMPERATURE: 98.4 F | HEART RATE: 68 BPM | OXYGEN SATURATION: 98 % | SYSTOLIC BLOOD PRESSURE: 110 MMHG | RESPIRATION RATE: 18 BRPM | DIASTOLIC BLOOD PRESSURE: 76 MMHG

## 2022-01-29 PROCEDURE — G0151 HHCP-SERV OF PT,EA 15 MIN: HCPCS

## 2022-01-29 PROCEDURE — 400013 HH SOC

## 2022-01-31 ENCOUNTER — HOME CARE VISIT (OUTPATIENT)
Dept: SCHEDULING | Facility: HOME HEALTH | Age: 87
End: 2022-01-31
Payer: MEDICARE

## 2022-01-31 PROCEDURE — G0157 HHC PT ASSISTANT EA 15: HCPCS

## 2022-02-01 ENCOUNTER — HOME CARE VISIT (OUTPATIENT)
Dept: SCHEDULING | Facility: HOME HEALTH | Age: 87
End: 2022-02-01
Payer: MEDICARE

## 2022-02-01 PROCEDURE — G0152 HHCP-SERV OF OT,EA 15 MIN: HCPCS

## 2022-02-02 VITALS
DIASTOLIC BLOOD PRESSURE: 72 MMHG | RESPIRATION RATE: 16 BRPM | HEART RATE: 72 BPM | TEMPERATURE: 97.2 F | SYSTOLIC BLOOD PRESSURE: 120 MMHG | OXYGEN SATURATION: 96 %

## 2022-02-03 ENCOUNTER — HOME CARE VISIT (OUTPATIENT)
Dept: SCHEDULING | Facility: HOME HEALTH | Age: 87
End: 2022-02-03
Payer: MEDICARE

## 2022-02-03 VITALS
RESPIRATION RATE: 16 BRPM | OXYGEN SATURATION: 99 % | SYSTOLIC BLOOD PRESSURE: 140 MMHG | HEART RATE: 72 BPM | DIASTOLIC BLOOD PRESSURE: 70 MMHG | TEMPERATURE: 97.3 F

## 2022-02-03 PROCEDURE — G0157 HHC PT ASSISTANT EA 15: HCPCS

## 2022-02-04 VITALS
SYSTOLIC BLOOD PRESSURE: 136 MMHG | HEART RATE: 79 BPM | OXYGEN SATURATION: 97 % | TEMPERATURE: 97.7 F | DIASTOLIC BLOOD PRESSURE: 80 MMHG

## 2022-02-07 ENCOUNTER — HOME CARE VISIT (OUTPATIENT)
Dept: SCHEDULING | Facility: HOME HEALTH | Age: 87
End: 2022-02-07
Payer: MEDICARE

## 2022-02-07 PROCEDURE — G0152 HHCP-SERV OF OT,EA 15 MIN: HCPCS

## 2022-02-08 ENCOUNTER — HOME CARE VISIT (OUTPATIENT)
Dept: SCHEDULING | Facility: HOME HEALTH | Age: 87
End: 2022-02-08
Payer: MEDICARE

## 2022-02-08 VITALS
HEART RATE: 74 BPM | DIASTOLIC BLOOD PRESSURE: 82 MMHG | OXYGEN SATURATION: 98 % | TEMPERATURE: 98.4 F | RESPIRATION RATE: 18 BRPM | SYSTOLIC BLOOD PRESSURE: 122 MMHG

## 2022-02-08 VITALS
SYSTOLIC BLOOD PRESSURE: 124 MMHG | TEMPERATURE: 98 F | OXYGEN SATURATION: 98 % | DIASTOLIC BLOOD PRESSURE: 70 MMHG | HEART RATE: 79 BPM

## 2022-02-08 PROCEDURE — G0151 HHCP-SERV OF PT,EA 15 MIN: HCPCS

## 2022-02-10 ENCOUNTER — HOME CARE VISIT (OUTPATIENT)
Dept: SCHEDULING | Facility: HOME HEALTH | Age: 87
End: 2022-02-10
Payer: MEDICARE

## 2022-02-10 VITALS
TEMPERATURE: 97 F | DIASTOLIC BLOOD PRESSURE: 70 MMHG | SYSTOLIC BLOOD PRESSURE: 120 MMHG | HEART RATE: 73 BPM | RESPIRATION RATE: 16 BRPM | OXYGEN SATURATION: 97 %

## 2022-02-10 PROCEDURE — G0157 HHC PT ASSISTANT EA 15: HCPCS

## 2022-02-11 ENCOUNTER — HOME CARE VISIT (OUTPATIENT)
Dept: SCHEDULING | Facility: HOME HEALTH | Age: 87
End: 2022-02-11
Payer: MEDICARE

## 2022-02-14 ENCOUNTER — HOME CARE VISIT (OUTPATIENT)
Dept: SCHEDULING | Facility: HOME HEALTH | Age: 87
End: 2022-02-14
Payer: MEDICARE

## 2022-02-14 PROCEDURE — G0152 HHCP-SERV OF OT,EA 15 MIN: HCPCS

## 2022-02-15 ENCOUNTER — HOME CARE VISIT (OUTPATIENT)
Dept: SCHEDULING | Facility: HOME HEALTH | Age: 87
End: 2022-02-15
Payer: MEDICARE

## 2022-02-15 VITALS
DIASTOLIC BLOOD PRESSURE: 79 MMHG | RESPIRATION RATE: 16 BRPM | SYSTOLIC BLOOD PRESSURE: 126 MMHG | OXYGEN SATURATION: 99 % | TEMPERATURE: 97.6 F | HEART RATE: 78 BPM

## 2022-02-15 PROCEDURE — G0157 HHC PT ASSISTANT EA 15: HCPCS

## 2022-02-16 VITALS
HEART RATE: 85 BPM | TEMPERATURE: 97.7 F | DIASTOLIC BLOOD PRESSURE: 60 MMHG | SYSTOLIC BLOOD PRESSURE: 110 MMHG | OXYGEN SATURATION: 97 %

## 2022-02-18 ENCOUNTER — HOME CARE VISIT (OUTPATIENT)
Dept: SCHEDULING | Facility: HOME HEALTH | Age: 87
End: 2022-02-18
Payer: MEDICARE

## 2022-02-18 PROCEDURE — G0157 HHC PT ASSISTANT EA 15: HCPCS

## 2022-02-20 VITALS
RESPIRATION RATE: 17 BRPM | HEART RATE: 99 BPM | OXYGEN SATURATION: 98 % | DIASTOLIC BLOOD PRESSURE: 60 MMHG | TEMPERATURE: 97.3 F | SYSTOLIC BLOOD PRESSURE: 116 MMHG

## 2022-02-22 ENCOUNTER — HOME CARE VISIT (OUTPATIENT)
Dept: SCHEDULING | Facility: HOME HEALTH | Age: 87
End: 2022-02-22
Payer: MEDICARE

## 2022-02-22 VITALS
TEMPERATURE: 97.1 F | OXYGEN SATURATION: 97 % | SYSTOLIC BLOOD PRESSURE: 118 MMHG | DIASTOLIC BLOOD PRESSURE: 66 MMHG | HEART RATE: 68 BPM | RESPIRATION RATE: 16 BRPM

## 2022-02-22 PROCEDURE — G0157 HHC PT ASSISTANT EA 15: HCPCS

## 2022-02-23 ENCOUNTER — HOME CARE VISIT (OUTPATIENT)
Dept: HOME HEALTH SERVICES | Facility: HOME HEALTH | Age: 87
End: 2022-02-23
Payer: MEDICARE

## 2022-02-25 ENCOUNTER — HOME CARE VISIT (OUTPATIENT)
Dept: HOME HEALTH SERVICES | Facility: HOME HEALTH | Age: 87
End: 2022-02-25
Payer: MEDICARE

## 2022-02-25 VITALS
OXYGEN SATURATION: 98 % | HEART RATE: 68 BPM | DIASTOLIC BLOOD PRESSURE: 68 MMHG | TEMPERATURE: 98 F | SYSTOLIC BLOOD PRESSURE: 118 MMHG | RESPIRATION RATE: 18 BRPM

## 2022-02-25 PROCEDURE — G0151 HHCP-SERV OF PT,EA 15 MIN: HCPCS

## 2022-03-18 PROBLEM — E78.5 HYPERLIPIDEMIA, MILD: Status: ACTIVE | Noted: 2019-06-25

## 2022-03-18 PROBLEM — M81.0: Status: ACTIVE | Noted: 2017-01-04

## 2022-03-19 PROBLEM — E03.8 SUBCLINICAL HYPOTHYROIDISM: Status: ACTIVE | Noted: 2018-06-21

## 2022-03-19 PROBLEM — Z63.6 CAREGIVER BURDEN: Status: ACTIVE | Noted: 2017-04-19

## 2022-03-19 PROBLEM — S82.842A BIMALLEOLAR FRACTURE OF LEFT ANKLE: Status: ACTIVE | Noted: 2021-11-23

## 2022-05-10 ENCOUNTER — TRANSCRIBE ORDER (OUTPATIENT)
Dept: SCHEDULING | Age: 87
End: 2022-05-10

## 2022-05-10 DIAGNOSIS — S82.852E: Primary | ICD-10-CM

## 2022-05-27 ENCOUNTER — HOSPITAL ENCOUNTER (OUTPATIENT)
Dept: CT IMAGING | Age: 87
Discharge: HOME | End: 2022-05-27
Attending: ORTHOPAEDIC SURGERY
Payer: MEDICARE

## 2022-05-27 DIAGNOSIS — S82.852E: ICD-10-CM

## 2022-05-27 PROCEDURE — 73700 CT LOWER EXTREMITY W/O DYE: CPT

## 2022-08-19 ENCOUNTER — TRANSCRIBE ORDER (OUTPATIENT)
Dept: SCHEDULING | Age: 87
End: 2022-08-19

## 2022-08-19 DIAGNOSIS — Z12.31 VISIT FOR SCREENING MAMMOGRAM: Primary | ICD-10-CM

## 2022-09-01 ENCOUNTER — HOSPITAL ENCOUNTER (OUTPATIENT)
Dept: MAMMOGRAPHY | Age: 87
Discharge: HOME | End: 2022-09-01
Attending: INTERNAL MEDICINE
Payer: MEDICARE

## 2022-09-01 DIAGNOSIS — Z12.31 VISIT FOR SCREENING MAMMOGRAM: ICD-10-CM

## 2022-09-01 PROCEDURE — 77067 SCR MAMMO BI INCL CAD: CPT

## 2023-01-24 RX ORDER — SODIUM CHLORIDE 9 MG/ML
25 INJECTION, SOLUTION INTRAVENOUS CONTINUOUS
Status: DISPENSED | OUTPATIENT
Start: 2023-01-31 | End: 2023-01-31

## 2023-01-24 RX ORDER — ZOLEDRONIC ACID 5 MG/100ML
5 INJECTION, SOLUTION INTRAVENOUS ONCE
Status: COMPLETED | OUTPATIENT
Start: 2023-01-31 | End: 2023-01-31

## 2023-01-25 LAB
CREATININE, EXTERNAL: 0.87
HBA1C MFR BLD HPLC: 5.7 %
LDL CHOLESTEROL, EXTERNAL: 145

## 2023-01-31 ENCOUNTER — HOSPITAL ENCOUNTER (OUTPATIENT)
Dept: INFUSION THERAPY | Age: 88
Discharge: HOME | End: 2023-01-31
Payer: MEDICARE

## 2023-01-31 VITALS
BODY MASS INDEX: 29.84 KG/M2 | RESPIRATION RATE: 18 BRPM | OXYGEN SATURATION: 96 % | HEIGHT: 60 IN | SYSTOLIC BLOOD PRESSURE: 137 MMHG | DIASTOLIC BLOOD PRESSURE: 62 MMHG | WEIGHT: 152 LBS | HEART RATE: 59 BPM | TEMPERATURE: 97.3 F

## 2023-01-31 LAB
ANION GAP BLD CALC-SCNC: 10 MMOL/L (ref 10–20)
CA-I BLD-MCNC: 1.15 MMOL/L (ref 1.12–1.32)
CHLORIDE BLD-SCNC: 106 MMOL/L (ref 98–107)
CO2 BLD-SCNC: 24 MMOL/L (ref 21–32)
CREAT BLD-MCNC: 0.82 MG/DL (ref 0.6–1.3)
GLUCOSE BLD-MCNC: 100 MG/DL (ref 65–100)
POTASSIUM BLD-SCNC: 4.2 MMOL/L (ref 3.5–5.1)
SERVICE CMNT-IMP: NORMAL
SODIUM BLD-SCNC: 139 MMOL/L (ref 136–145)

## 2023-01-31 PROCEDURE — 96374 THER/PROPH/DIAG INJ IV PUSH: CPT

## 2023-01-31 PROCEDURE — 80047 BASIC METABLC PNL IONIZED CA: CPT

## 2023-01-31 PROCEDURE — 74011250636 HC RX REV CODE- 250/636: Performed by: INTERNAL MEDICINE

## 2023-01-31 RX ORDER — LEVOTHYROXINE SODIUM 25 UG/1
25 TABLET ORAL
COMMUNITY

## 2023-01-31 RX ADMIN — ZOLEDRONIC ACID 5 MG: 0.05 INJECTION, SOLUTION INTRAVENOUS at 16:49

## 2023-01-31 RX ADMIN — SODIUM CHLORIDE 25 ML/HR: 9 INJECTION, SOLUTION INTRAVENOUS at 16:45

## 2023-01-31 NOTE — PROGRESS NOTES
Outpatient Infusion Center Short Visit Progress Note    5753 Ms. Myers admitted to [facility] for [address] [facility] ambulatory in stable condition. Assessment completed. No new concerns voiced. Vital Signs:  Visit Vitals  /62   Pulse (!) 59   Temp 97.3 °F (36.3 °C)   Resp 18   Ht 5' (1.524 m)   Wt 68.9 kg (152 lb)   SpO2 96%   BMI 29.69 kg/m²         PIV with positive blood return. Lab Results:  Recent Results (from the past 12 hour(s))   POC CHEM8    Collection Time: 01/31/23  4:32 PM   Result Value Ref Range    Calcium, ionized (POC) 1.15 1.12 - 1.32 mmol/L    Sodium (POC) 139 136 - 145 mmol/L    Potassium (POC) 4.2 3.5 - 5.1 mmol/L    Chloride (POC) 106 98 - 107 mmol/L    CO2 (POC) 24.0 21 - 32 mmol/L    Anion gap (POC) 10 10 - 20 mmol/L    Glucose (POC) 100 65 - 100 mg/dL    Creatinine (POC) 0.82 0.6 - 1.3 mg/dL    eGFR (POC) >60 >60 ml/min/1.73m2    Comment Comment Not Indicated. Medications:  Medications Administered       0.9% sodium chloride infusion       Admin Date  01/31/2023 Action  New Bag Dose  25 mL/hr Rate  25 mL/hr Route  IntraVENous Administered By  Eddie Pavon [place]              zoledronic acid (RECLAST) IVPB 5 mg       Admin Date  01/31/2023 Action  New Bag Dose  5 mg Rate  400 mL/hr Route  IntraVENous Administered By  Madeline Boas                  Pt declined post treatment observation    declined AVS    Patient tolerated treatment well. Patient discharged from [facility] ambulatory in no distress at 1720. Patient aware of next appointment. Be Davenport RN  January 31, 2023    No future appointments.

## 2023-06-28 ENCOUNTER — OFFICE VISIT (OUTPATIENT)
Age: 88
End: 2023-06-28
Payer: MEDICARE

## 2023-06-28 VITALS
HEART RATE: 55 BPM | BODY MASS INDEX: 28.7 KG/M2 | DIASTOLIC BLOOD PRESSURE: 73 MMHG | SYSTOLIC BLOOD PRESSURE: 116 MMHG | WEIGHT: 146.2 LBS | TEMPERATURE: 98 F | OXYGEN SATURATION: 94 % | RESPIRATION RATE: 20 BRPM | HEIGHT: 60 IN

## 2023-06-28 DIAGNOSIS — E03.8 SUBCLINICAL HYPOTHYROIDISM: ICD-10-CM

## 2023-06-28 DIAGNOSIS — Z00.00 ENCOUNTER FOR MEDICAL EXAMINATION TO ESTABLISH CARE: Primary | ICD-10-CM

## 2023-06-28 DIAGNOSIS — M81.0 OSTEOPOROSIS OF FOREARM WITHOUT PATHOLOGICAL FRACTURE: ICD-10-CM

## 2023-06-28 DIAGNOSIS — F03.A0 MILD DEMENTIA WITHOUT BEHAVIORAL DISTURBANCE, PSYCHOTIC DISTURBANCE, MOOD DISTURBANCE, OR ANXIETY, UNSPECIFIED DEMENTIA TYPE (HCC): ICD-10-CM

## 2023-06-28 DIAGNOSIS — G89.29 CHRONIC BILATERAL LOW BACK PAIN WITHOUT SCIATICA: ICD-10-CM

## 2023-06-28 DIAGNOSIS — M54.50 CHRONIC BILATERAL LOW BACK PAIN WITHOUT SCIATICA: ICD-10-CM

## 2023-06-28 PROCEDURE — 99204 OFFICE O/P NEW MOD 45 MIN: CPT | Performed by: FAMILY MEDICINE

## 2023-06-28 PROCEDURE — G8419 CALC BMI OUT NRM PARAM NOF/U: HCPCS | Performed by: FAMILY MEDICINE

## 2023-06-28 PROCEDURE — G8427 DOCREV CUR MEDS BY ELIG CLIN: HCPCS | Performed by: FAMILY MEDICINE

## 2023-06-28 PROCEDURE — 1123F ACP DISCUSS/DSCN MKR DOCD: CPT | Performed by: FAMILY MEDICINE

## 2023-06-28 PROCEDURE — 1090F PRES/ABSN URINE INCON ASSESS: CPT | Performed by: FAMILY MEDICINE

## 2023-06-28 PROCEDURE — 4004F PT TOBACCO SCREEN RCVD TLK: CPT | Performed by: FAMILY MEDICINE

## 2023-06-28 RX ORDER — DONEPEZIL HYDROCHLORIDE 10 MG/1
TABLET, FILM COATED ORAL
COMMUNITY
Start: 2023-06-26

## 2023-06-28 RX ORDER — ZOLEDRONIC ACID 5 MG/100ML
5 INJECTION, SOLUTION INTRAVENOUS ONCE
COMMUNITY

## 2023-06-28 RX ORDER — MEMANTINE HYDROCHLORIDE 5 MG/1
TABLET ORAL
COMMUNITY
Start: 2023-06-26

## 2023-06-28 SDOH — ECONOMIC STABILITY: HOUSING INSECURITY
IN THE LAST 12 MONTHS, WAS THERE A TIME WHEN YOU DID NOT HAVE A STEADY PLACE TO SLEEP OR SLEPT IN A SHELTER (INCLUDING NOW)?: NO

## 2023-06-28 SDOH — ECONOMIC STABILITY: INCOME INSECURITY: HOW HARD IS IT FOR YOU TO PAY FOR THE VERY BASICS LIKE FOOD, HOUSING, MEDICAL CARE, AND HEATING?: NOT HARD AT ALL

## 2023-06-28 SDOH — ECONOMIC STABILITY: FOOD INSECURITY: WITHIN THE PAST 12 MONTHS, THE FOOD YOU BOUGHT JUST DIDN'T LAST AND YOU DIDN'T HAVE MONEY TO GET MORE.: NEVER TRUE

## 2023-06-28 SDOH — ECONOMIC STABILITY: FOOD INSECURITY: WITHIN THE PAST 12 MONTHS, YOU WORRIED THAT YOUR FOOD WOULD RUN OUT BEFORE YOU GOT MONEY TO BUY MORE.: NEVER TRUE

## 2023-06-28 ASSESSMENT — PATIENT HEALTH QUESTIONNAIRE - PHQ9
SUM OF ALL RESPONSES TO PHQ QUESTIONS 1-9: 1
SUM OF ALL RESPONSES TO PHQ QUESTIONS 1-9: 1
2. FEELING DOWN, DEPRESSED OR HOPELESS: 1
1. LITTLE INTEREST OR PLEASURE IN DOING THINGS: 0
SUM OF ALL RESPONSES TO PHQ9 QUESTIONS 1 & 2: 1
SUM OF ALL RESPONSES TO PHQ QUESTIONS 1-9: 1
SUM OF ALL RESPONSES TO PHQ QUESTIONS 1-9: 1

## 2023-06-28 ASSESSMENT — ENCOUNTER SYMPTOMS
GASTROINTESTINAL NEGATIVE: 1
RESPIRATORY NEGATIVE: 1

## 2023-07-27 PROBLEM — M81.0: Status: ACTIVE | Noted: 2017-01-04

## 2023-08-15 ENCOUNTER — TRANSCRIBE ORDERS (OUTPATIENT)
Facility: HOSPITAL | Age: 88
End: 2023-08-15

## 2023-08-15 DIAGNOSIS — Z12.31 SCREENING MAMMOGRAM FOR HIGH-RISK PATIENT: Primary | ICD-10-CM

## 2023-09-06 ENCOUNTER — HOSPITAL ENCOUNTER (OUTPATIENT)
Facility: HOSPITAL | Age: 88
Discharge: HOME | End: 2023-09-09
Attending: FAMILY MEDICINE
Payer: MEDICARE

## 2023-09-06 DIAGNOSIS — Z12.31 SCREENING MAMMOGRAM FOR HIGH-RISK PATIENT: ICD-10-CM

## 2023-09-06 PROCEDURE — 77067 SCR MAMMO BI INCL CAD: CPT

## 2023-12-27 ENCOUNTER — HOSPITAL ENCOUNTER (OUTPATIENT)
Facility: HOSPITAL | Age: 88
Discharge: HOME | End: 2023-12-30
Payer: MEDICARE

## 2023-12-27 DIAGNOSIS — R41.3 MEMORY LOSS: ICD-10-CM

## 2023-12-27 PROCEDURE — 70551 MRI BRAIN STEM W/O DYE: CPT

## 2024-01-02 PROBLEM — F03.A0 MILD DEMENTIA WITHOUT BEHAVIORAL DISTURBANCE, PSYCHOTIC DISTURBANCE, MOOD DISTURBANCE, OR ANXIETY, UNSPECIFIED DEMENTIA TYPE (HCC): Status: ACTIVE | Noted: 2024-01-02

## 2024-01-03 ENCOUNTER — OFFICE VISIT (OUTPATIENT)
Age: 89
End: 2024-01-03
Payer: MEDICARE

## 2024-01-03 VITALS
SYSTOLIC BLOOD PRESSURE: 125 MMHG | HEART RATE: 53 BPM | TEMPERATURE: 97.8 F | RESPIRATION RATE: 18 BRPM | WEIGHT: 145.2 LBS | OXYGEN SATURATION: 96 % | BODY MASS INDEX: 28.51 KG/M2 | DIASTOLIC BLOOD PRESSURE: 80 MMHG | HEIGHT: 60 IN

## 2024-01-03 DIAGNOSIS — E03.8 SUBCLINICAL HYPOTHYROIDISM: ICD-10-CM

## 2024-01-03 DIAGNOSIS — G30.9 MODERATE ALZHEIMER'S DEMENTIA WITHOUT BEHAVIORAL DISTURBANCE, PSYCHOTIC DISTURBANCE, MOOD DISTURBANCE, OR ANXIETY, UNSPECIFIED TIMING OF DEMENTIA ONSET (HCC): Primary | ICD-10-CM

## 2024-01-03 DIAGNOSIS — F02.B0 MODERATE ALZHEIMER'S DEMENTIA WITHOUT BEHAVIORAL DISTURBANCE, PSYCHOTIC DISTURBANCE, MOOD DISTURBANCE, OR ANXIETY, UNSPECIFIED TIMING OF DEMENTIA ONSET (HCC): Primary | ICD-10-CM

## 2024-01-03 DIAGNOSIS — R41.3 MEMORY LOSS: ICD-10-CM

## 2024-01-03 DIAGNOSIS — E78.5 HYPERLIPIDEMIA, MILD: ICD-10-CM

## 2024-01-03 DIAGNOSIS — B35.1 ONYCHOMYCOSIS: ICD-10-CM

## 2024-01-03 LAB
ALBUMIN SERPL-MCNC: 3.7 G/DL (ref 3.5–5)
ALBUMIN/GLOB SERPL: 1.1 (ref 1.1–2.2)
ALP SERPL-CCNC: 93 U/L (ref 45–117)
ALT SERPL-CCNC: 21 U/L (ref 12–78)
ANION GAP SERPL CALC-SCNC: 6 MMOL/L (ref 5–15)
AST SERPL-CCNC: 17 U/L (ref 15–37)
BILIRUB SERPL-MCNC: 0.8 MG/DL (ref 0.2–1)
BUN SERPL-MCNC: 12 MG/DL (ref 6–20)
BUN/CREAT SERPL: 13 (ref 12–20)
CALCIUM SERPL-MCNC: 9 MG/DL (ref 8.5–10.1)
CHLORIDE SERPL-SCNC: 105 MMOL/L (ref 97–108)
CHOLEST SERPL-MCNC: 241 MG/DL
CO2 SERPL-SCNC: 29 MMOL/L (ref 21–32)
CREAT SERPL-MCNC: 0.93 MG/DL (ref 0.55–1.02)
ERYTHROCYTE [DISTWIDTH] IN BLOOD BY AUTOMATED COUNT: 13.2 % (ref 11.5–14.5)
GLOBULIN SER CALC-MCNC: 3.3 G/DL (ref 2–4)
GLUCOSE SERPL-MCNC: 93 MG/DL (ref 65–100)
HCT VFR BLD AUTO: 46.7 % (ref 35–47)
HDLC SERPL-MCNC: 63 MG/DL
HDLC SERPL: 3.8 (ref 0–5)
HGB BLD-MCNC: 14.7 G/DL (ref 11.5–16)
LDLC SERPL CALC-MCNC: 154.4 MG/DL (ref 0–100)
MCH RBC QN AUTO: 28.5 PG (ref 26–34)
MCHC RBC AUTO-ENTMCNC: 31.5 G/DL (ref 30–36.5)
MCV RBC AUTO: 90.7 FL (ref 80–99)
NRBC # BLD: 0 K/UL (ref 0–0.01)
NRBC BLD-RTO: 0 PER 100 WBC
PLATELET # BLD AUTO: 306 K/UL (ref 150–400)
PMV BLD AUTO: 9.6 FL (ref 8.9–12.9)
POTASSIUM SERPL-SCNC: 4.6 MMOL/L (ref 3.5–5.1)
PROT SERPL-MCNC: 7 G/DL (ref 6.4–8.2)
RBC # BLD AUTO: 5.15 M/UL (ref 3.8–5.2)
SODIUM SERPL-SCNC: 140 MMOL/L (ref 136–145)
T4 FREE SERPL-MCNC: 1.1 NG/DL (ref 0.8–1.5)
TRIGL SERPL-MCNC: 118 MG/DL
TSH SERPL DL<=0.05 MIU/L-ACNC: 3.04 UIU/ML (ref 0.36–3.74)
VLDLC SERPL CALC-MCNC: 23.6 MG/DL
WBC # BLD AUTO: 5.4 K/UL (ref 3.6–11)

## 2024-01-03 PROCEDURE — 1123F ACP DISCUSS/DSCN MKR DOCD: CPT | Performed by: FAMILY MEDICINE

## 2024-01-03 PROCEDURE — G8484 FLU IMMUNIZE NO ADMIN: HCPCS | Performed by: FAMILY MEDICINE

## 2024-01-03 PROCEDURE — 99214 OFFICE O/P EST MOD 30 MIN: CPT | Performed by: FAMILY MEDICINE

## 2024-01-03 PROCEDURE — G8419 CALC BMI OUT NRM PARAM NOF/U: HCPCS | Performed by: FAMILY MEDICINE

## 2024-01-03 PROCEDURE — 1090F PRES/ABSN URINE INCON ASSESS: CPT | Performed by: FAMILY MEDICINE

## 2024-01-03 PROCEDURE — 1036F TOBACCO NON-USER: CPT | Performed by: FAMILY MEDICINE

## 2024-01-03 PROCEDURE — G8427 DOCREV CUR MEDS BY ELIG CLIN: HCPCS | Performed by: FAMILY MEDICINE

## 2024-01-03 RX ORDER — SERTRALINE HYDROCHLORIDE 25 MG/1
25 TABLET, FILM COATED ORAL DAILY
COMMUNITY
Start: 2023-08-18

## 2024-01-03 SDOH — ECONOMIC STABILITY: HOUSING INSECURITY
IN THE LAST 12 MONTHS, WAS THERE A TIME WHEN YOU DID NOT HAVE A STEADY PLACE TO SLEEP OR SLEPT IN A SHELTER (INCLUDING NOW)?: PATIENT DECLINED

## 2024-01-03 SDOH — ECONOMIC STABILITY: FOOD INSECURITY: WITHIN THE PAST 12 MONTHS, YOU WORRIED THAT YOUR FOOD WOULD RUN OUT BEFORE YOU GOT MONEY TO BUY MORE.: PATIENT DECLINED

## 2024-01-03 SDOH — ECONOMIC STABILITY: FOOD INSECURITY: WITHIN THE PAST 12 MONTHS, THE FOOD YOU BOUGHT JUST DIDN'T LAST AND YOU DIDN'T HAVE MONEY TO GET MORE.: PATIENT DECLINED

## 2024-01-03 SDOH — ECONOMIC STABILITY: INCOME INSECURITY: HOW HARD IS IT FOR YOU TO PAY FOR THE VERY BASICS LIKE FOOD, HOUSING, MEDICAL CARE, AND HEATING?: PATIENT DECLINED

## 2024-01-03 ASSESSMENT — ENCOUNTER SYMPTOMS
RESPIRATORY NEGATIVE: 1
ROS SKIN COMMENTS: THICKENED TOENAILS

## 2024-01-03 ASSESSMENT — PATIENT HEALTH QUESTIONNAIRE - PHQ9: DEPRESSION UNABLE TO ASSESS: PT REFUSES

## 2024-01-03 NOTE — PROGRESS NOTES
Chief Complaint   Patient presents with    Follow-up Chronic Condition     6 month follow-up     Vitals:    01/03/24 0843   BP: 125/80   Site: Right Upper Arm   Position: Sitting   Cuff Size: Medium Adult   Pulse: 53   Resp: 18   Temp: 97.8 °F (36.6 °C)   TempSrc: Temporal   SpO2: 96%   Weight: 65.9 kg (145 lb 3.2 oz)   Height: 1.524 m (5')     1. Have you been to the ER, urgent care clinic since your last visit?  Hospitalized since your last visit?No    2. Have you seen or consulted any other health care providers outside of the [facility] since your last visit?  Include any pap smears or colon screening. No    
patient has received an after-visit summary and questions were answered concerning future plans.  I have discussed medication side effects and warnings with the patient as well.    Return in about 6 months (around 7/3/2024) for Follow-up on Chronic Conditions.      Electronically Signed: Michael Nash MD     History   Patients past medical, surgical and family histories were reviewed and updated.    Past Medical History:   Diagnosis Date    Arthritis     Dementia (HCC)     Patient sees Micheal Rosas (neurology)    Gastroesophageal reflux disease without esophagitis 12/09/2016    Gastrointestinal disorder     gerd    Ill-defined condition     cholesterol     Past Surgical History:   Procedure Laterality Date    BREAST BIOPSY  10-15 yrs ago    neg path, unsure which breast    COLONOSCOPY N/A 1/4/2019    Dr. Suh, no repeat recommended    COLONOSCOPY  01/01/2012    approx date, normal    EYE SURGERY Right 04/27/2023    ORTHOPEDIC SURGERY      right shoulder    ORTHOPEDIC SURGERY Left 11/2021    Leg compound fracture    UPPER GASTROINTESTINAL ENDOSCOPY      no ulcers    WISDOM TOOTH EXTRACTION       Family History   Problem Relation Age of Onset    Hypertension Mother     No Known Problems Father     Kidney Disease Mother         dialysis    Cancer Brother         tongue, lung     Social History     Socioeconomic History    Marital status:      Spouse name: Not on file    Number of children: Not on file    Years of education: Not on file    Highest education level: Not on file   Occupational History    Not on file   Tobacco Use    Smoking status: Never    Smokeless tobacco: Never   Vaping Use    Vaping Use: Never used   Substance and Sexual Activity    Alcohol use: Yes     Alcohol/week: 3.0 standard drinks of alcohol     Types: 3 Drinks containing 0.5 oz of alcohol per week    Drug use: No    Sexual activity: Not on file   Other Topics Concern    Not on file   Social History Narrative    Has 4 children

## 2024-01-11 LAB
Lab: NORMAL
Lab: NORMAL
REFERENCE LAB: NORMAL

## 2024-01-22 ENCOUNTER — TELEPHONE (OUTPATIENT)
Age: 89
End: 2024-01-22

## 2024-01-22 NOTE — TELEPHONE ENCOUNTER
----- Message from Ashley Rinner sent at 1/18/2024 12:58 PM EST -----  Subject: Message to Provider    QUESTIONS  Information for Provider? Alex with [facility] needs the last visit   notes faxed to them at [phone]. Caller is sending a fax to the office   for Pt verification to start the process. Caller would like an update that   the fax was received.   ---------------------------------------------------------------------------  --------------  CALL BACK INFO  [phone]; OK to leave message on voicemail  ---------------------------------------------------------------------------  --------------  SCRIPT ANSWERS  Relationship to Patient? Covered Entity  Covered Entity Type? Pharmacy?  Representative Name? Alex with [facility]

## 2024-01-24 RX ORDER — SODIUM CHLORIDE 9 MG/ML
5-250 INJECTION, SOLUTION INTRAVENOUS PRN
Status: CANCELLED | OUTPATIENT
Start: 2024-01-31

## 2024-01-24 RX ORDER — SODIUM CHLORIDE 0.9 % (FLUSH) 0.9 %
5-40 SYRINGE (ML) INJECTION PRN
Status: CANCELLED | OUTPATIENT
Start: 2024-01-31

## 2024-01-31 ENCOUNTER — HOSPITAL ENCOUNTER (OUTPATIENT)
Facility: HOSPITAL | Age: 89
Setting detail: INFUSION SERIES
Discharge: HOME | End: 2024-01-31
Payer: MEDICARE

## 2024-01-31 VITALS
RESPIRATION RATE: 17 BRPM | WEIGHT: 142.4 LBS | BODY MASS INDEX: 27.96 KG/M2 | DIASTOLIC BLOOD PRESSURE: 79 MMHG | TEMPERATURE: 98 F | OXYGEN SATURATION: 97 % | SYSTOLIC BLOOD PRESSURE: 152 MMHG | HEART RATE: 55 BPM | HEIGHT: 60 IN

## 2024-01-31 DIAGNOSIS — M81.0 OSTEOPOROSIS OF FOREARM WITHOUT PATHOLOGICAL FRACTURE: Primary | ICD-10-CM

## 2024-01-31 PROCEDURE — 96374 THER/PROPH/DIAG INJ IV PUSH: CPT

## 2024-01-31 PROCEDURE — 6360000002 HC RX W HCPCS: Performed by: INTERNAL MEDICINE

## 2024-01-31 PROCEDURE — 96365 THER/PROPH/DIAG IV INF INIT: CPT

## 2024-01-31 PROCEDURE — 2580000003 HC RX 258: Performed by: INTERNAL MEDICINE

## 2024-01-31 RX ORDER — SODIUM CHLORIDE 9 MG/ML
5-250 INJECTION, SOLUTION INTRAVENOUS PRN
OUTPATIENT
Start: 2025-01-26

## 2024-01-31 RX ORDER — ZOLEDRONIC ACID 5 MG/100ML
5 INJECTION, SOLUTION INTRAVENOUS ONCE
Status: COMPLETED | OUTPATIENT
Start: 2024-01-31 | End: 2024-01-31

## 2024-01-31 RX ORDER — SODIUM CHLORIDE 9 MG/ML
5-250 INJECTION, SOLUTION INTRAVENOUS PRN
Status: DISCONTINUED | OUTPATIENT
Start: 2024-01-31 | End: 2024-02-01 | Stop reason: HOSPADM

## 2024-01-31 RX ORDER — SODIUM CHLORIDE 0.9 % (FLUSH) 0.9 %
5-40 SYRINGE (ML) INJECTION PRN
OUTPATIENT
Start: 2025-01-26

## 2024-01-31 RX ORDER — ZOLEDRONIC ACID 5 MG/100ML
5 INJECTION, SOLUTION INTRAVENOUS ONCE
Status: CANCELLED | OUTPATIENT
Start: 2025-01-26 | End: 2025-01-26

## 2024-01-31 RX ADMIN — SODIUM CHLORIDE 25 ML/HR: 9 INJECTION, SOLUTION INTRAVENOUS at 13:07

## 2024-01-31 RX ADMIN — ZOLEDRONIC ACID 5 MG: 0.05 INJECTION, SOLUTION INTRAVENOUS at 13:12

## 2024-01-31 NOTE — PROGRESS NOTES
OUTPATIENT INFUSION CENTER     DISCHARGE INSTRUCTIONS FOR:  ZOMETADUYENT (Zoledronic Acid):     1.  Be sure to inform your Dentist that you are taking this drug prior to invasive dental  procedures.  You should avoid major dental work while you are being treated with this     medicine.  Report any signs/symptoms of jaw pain to your physician immediately.     2.  This medicine needs to be taken on a fixed schedule.  If you miss a dose, make sure your doctor is informed.  You will also need to have frequent lab work done; try to keep all of your appointments.   Your doctor may also prescribe Vitamin D and calcium supplements.     3.  Make sure your doctor knows if you are taking fluid pills, arthritis medicines or antibiotics,  or if you have a history of problems with your gums, mouth or teeth.     4.         Drink some extra fluids during the 12-hour period before and after you receive Zometa.  Report any changes in urinary patterns (example: a decrease in how often you urinate).  Also report rapid weight gain, swelling of the hands, ankles or feet, unusual tiredness or weakness or unusual bleeding or bruising.     5.  You may resume your normal activities after your infusion.  Some people may have mild side effects from Zometa.  These side effects usually improve after the first infusion.  They may include:  Mild nausea, diarrhea, constipation or upset stomach;  Red or irritated eyes;  redness or itching at IV site;  Mild skin rash, mild numbness, tingling, or burning in extremities;  Headache, dizziness, mild muscle or joint pain, trouble sleeping;  Nasal congestion, runny nose, sneezing     6.   Signs/Symptoms of an allergic reaction may require immediate medical attention.        These are rare, but may include one or more of the following:      Skin - Swelling, blistering, weeping, crusting, rash, itching or;  Wheezing, cough, sore throat, chest tightness, chest pain or shortness of breath, irregular heart

## 2024-01-31 NOTE — PROGRESS NOTES
Outpatient Infusion Center Progress Note        Date: 2024    Name: Paradise Smith    MRN: [id number]         : 1935    Ms. Smith Arrived ambulatory and in no distress for Reclast Regimen.  Assessment was completed, no acute issues at this time, no new complaints voiced.  LAC 24G PIV placed  with +blood  return. Labs reviewed from 24. Pt denied having recent invasive dental procedure or pain in jaw. Criteria met for today treatment.       Ms. Smith's vitals were reviewed.  Patient Vitals for the past 12 hrs:   Temp Pulse Resp BP SpO2   24 1330 98 °F (36.7 °C) 55 17 (!) 152/79 97 %   24 1245 97.9 °F (36.6 °C) 60 17 135/83 94 %           Medications received:  Medications Administered         0.9 % sodium chloride infusion Admin Date  2024 Action  New Bag Dose  25 mL/hr Rate  25 mL/hr Route  IntraVENous Administered By  Fern Jessica RN        zoledronic acid (RECLAST) 5 mg/100 mL infusion Admin Date  2024 Action  New Bag Dose  5 mg Rate  400 mL/hr Route  IntraVENous Administered By  Fern Jessica RN          Pt stated that she had Reclast  before and had no problem with it. No s/s of reaction noted this time.      Ms. Smith tolerated treatment well and was discharged from Outpatient Infusion Center in stable condition at 1340. PIV flushed  and removed per protocol. She is aware of her next appointment.    Fern Jessica RN  2024    Future Appointments:  No future appointments.

## 2024-04-09 ENCOUNTER — TELEPHONE (OUTPATIENT)
Age: 89
End: 2024-04-09

## 2024-04-09 NOTE — TELEPHONE ENCOUNTER
----- Message from Anayeli Caruso sent at 4/9/2024 10:57 AM EDT -----  Subject: Message to Provider    QUESTIONS  Information for Provider? call susan from [facility]   back about a fax.... [phone]  ---------------------------------------------------------------------------  --------------  CALL BACK INFO  [phone]; OK to leave message on voicemail  ---------------------------------------------------------------------------  --------------  SCRIPT ANSWERS  Relationship to Patient? Covered Entity  Covered Entity Type? Assisted Living Facility?  Representative Name? susan

## 2024-04-09 NOTE — TELEPHONE ENCOUNTER
Returned call to [facility]. Confirmed paperwork has been received. Made them aware Dr. Nash is back in the clinic this Friday and we will fax back forms then.

## 2024-04-24 ENCOUNTER — OFFICE VISIT (OUTPATIENT)
Age: 89
End: 2024-04-24
Payer: MEDICARE

## 2024-04-24 ENCOUNTER — PATIENT MESSAGE (OUTPATIENT)
Age: 89
End: 2024-04-24

## 2024-04-24 VITALS
HEART RATE: 56 BPM | HEIGHT: 60 IN | OXYGEN SATURATION: 94 % | BODY MASS INDEX: 27.84 KG/M2 | TEMPERATURE: 98.4 F | RESPIRATION RATE: 18 BRPM | DIASTOLIC BLOOD PRESSURE: 73 MMHG | SYSTOLIC BLOOD PRESSURE: 123 MMHG | WEIGHT: 141.8 LBS

## 2024-04-24 DIAGNOSIS — F32.1 CURRENT MODERATE EPISODE OF MAJOR DEPRESSIVE DISORDER WITHOUT PRIOR EPISODE (HCC): ICD-10-CM

## 2024-04-24 DIAGNOSIS — L98.9 PRECANCEROUS SKIN LESION: ICD-10-CM

## 2024-04-24 DIAGNOSIS — G30.9 MODERATE ALZHEIMER'S DEMENTIA WITHOUT BEHAVIORAL DISTURBANCE, PSYCHOTIC DISTURBANCE, MOOD DISTURBANCE, OR ANXIETY, UNSPECIFIED TIMING OF DEMENTIA ONSET (HCC): Primary | ICD-10-CM

## 2024-04-24 DIAGNOSIS — F02.B0 MODERATE ALZHEIMER'S DEMENTIA WITHOUT BEHAVIORAL DISTURBANCE, PSYCHOTIC DISTURBANCE, MOOD DISTURBANCE, OR ANXIETY, UNSPECIFIED TIMING OF DEMENTIA ONSET (HCC): Primary | ICD-10-CM

## 2024-04-24 DIAGNOSIS — B00.2 ORAL HERPES: ICD-10-CM

## 2024-04-24 DIAGNOSIS — M81.0 OSTEOPOROSIS OF FOREARM WITHOUT PATHOLOGICAL FRACTURE: ICD-10-CM

## 2024-04-24 DIAGNOSIS — K21.9 GASTROESOPHAGEAL REFLUX DISEASE WITHOUT ESOPHAGITIS: ICD-10-CM

## 2024-04-24 DIAGNOSIS — E03.8 SUBCLINICAL HYPOTHYROIDISM: ICD-10-CM

## 2024-04-24 DIAGNOSIS — E78.5 HYPERLIPIDEMIA, MILD: ICD-10-CM

## 2024-04-24 DIAGNOSIS — Z78.9 STATIN INTOLERANCE: ICD-10-CM

## 2024-04-24 PROCEDURE — 99215 OFFICE O/P EST HI 40 MIN: CPT | Performed by: FAMILY MEDICINE

## 2024-04-24 RX ORDER — LANOLIN ALCOHOL/MO/W.PET/CERES
2 CREAM (GRAM) TOPICAL DAILY
COMMUNITY
Start: 2024-04-24

## 2024-04-24 RX ORDER — ACYCLOVIR 400 MG/1
TABLET ORAL
COMMUNITY
Start: 2024-04-24

## 2024-04-24 NOTE — PROGRESS NOTES
Paradise Smith  88 y.o. female  : 1935  [address]  [facility] [address]  [id number]   [facility]: Progress Note  Michael Nash MD       Encounter Date: 2024    Chief Complaint   Patient presents with    Follow-up Chronic Condition     Assisted living forms.         Subjective   History of Present Illness  This is an 88-year-old female here today for preadmission physical exam.  She is here today with her son.  The patient is in the process of transitioning to an assisted living facility and an Alzheimer's care facility, specifically [place].     She is allergic to STATINS, which causes a reaction of myalgia.    Review of Systems       Objective   Blood pressure 123/73, pulse 56, temperature 98.4 °F (36.9 °C), temperature source Temporal, resp. rate 18, height 1.524 m (5'), weight 64.3 kg (141 lb 12.8 oz), SpO2 94 %.    Physical Exam  Constitutional:       General: She is not in acute distress.     Appearance: She is not ill-appearing.   Eyes:      Conjunctiva/sclera: Conjunctivae normal.   Cardiovascular:      Rate and Rhythm: Normal rate and regular rhythm.      Heart sounds: Normal heart sounds.   Pulmonary:      Effort: Pulmonary effort is normal.      Breath sounds: Normal breath sounds.   Skin:     Capillary Refill: Capillary refill takes less than 2 seconds.   Neurological:      General: No focal deficit present.      Mental Status: She is alert.      Cranial Nerves: Cranial nerves 2-12 are intact.      Motor: Motor function is intact.   Psychiatric:         Mood and Affect: Mood normal.         Speech: Speech normal.         Behavior: Behavior normal.         Thought Content: Thought content normal.         Cognition and Memory: Impaired: Moderate.         Judgment: Judgment is not impulsive or inappropriate.         Assessment & Plan  1. Preadmission physical examination.  Time spent today in preadmission physical and counseling for [place]

## 2024-04-24 NOTE — PROGRESS NOTES
Chief Complaint   Patient presents with    Follow-up Chronic Condition     Assisted living forms.      Vitals:    04/24/24 1614   BP: 123/73   Site: Right Upper Arm   Position: Sitting   Cuff Size: Medium Adult   Pulse: 56   Resp: 18   Temp: 98.4 °F (36.9 °C)   TempSrc: Temporal   SpO2: 94%   Weight: 64.3 kg (141 lb 12.8 oz)   Height: 1.524 m (5')     \"Have you been to the ER, urgent care clinic since your last visit?  Hospitalized since your last visit?\"    NO    “Have you seen or consulted any other health care providers outside of [facility] since your last visit?”    NO            Click Here for Release of Records Request

## 2024-04-26 RX ORDER — FLUOROURACIL 50 MG/G
CREAM TOPICAL
Qty: 40 G | Refills: 0
Start: 2024-05-06 | End: 2024-05-20

## 2024-04-29 ENCOUNTER — TELEPHONE (OUTPATIENT)
Age: 89
End: 2024-04-29

## 2024-04-29 NOTE — TELEPHONE ENCOUNTER
Ladi from [facility] called needing clarification a few things. First, she stated that they received the patient's medication list and noticed that Acyclovir was on there. She stated that it says to take the medication for 5-10 days, but in assisted living they don't do ranges on medication, so they were needing to know how many days for the medication. Second, Ladi stated that Reclast is listed on the medication list as well and that is considered to be an IV, but they don't do IV's in assisted living. Ladi stated that the patient is set to move in on Thursday, so she will need to be contacted on tomorrow to make sure everything is taken care of. Ladi can be contacted at [phone].    Thanks!

## 2024-04-30 NOTE — TELEPHONE ENCOUNTER
Spoke with Ladi at [facility].  Clarified Acyclovir to be given for 10 days.  Reclast will be given by her Endocrinologist annually in January.  Does not need to be given by [facility].

## 2024-05-07 PROBLEM — Z78.9 STATIN INTOLERANCE: Status: ACTIVE | Noted: 2024-05-07

## 2024-06-06 DIAGNOSIS — F02.B3 MODERATE ALZHEIMER'S DEMENTIA WITH MOOD DISTURBANCE, UNSPECIFIED TIMING OF DEMENTIA ONSET (HCC): Primary | ICD-10-CM

## 2024-06-06 DIAGNOSIS — M81.0 AGE RELATED OSTEOPOROSIS, UNSPECIFIED PATHOLOGICAL FRACTURE PRESENCE: ICD-10-CM

## 2024-06-06 DIAGNOSIS — E02 SUBCLINICAL IODINE-DEFICIENCY HYPOTHYROIDISM: ICD-10-CM

## 2024-06-06 DIAGNOSIS — F32.1 MAJOR DEPRESSIVE DISORDER, SINGLE EPISODE, MODERATE (HCC): ICD-10-CM

## 2024-06-06 DIAGNOSIS — Z91.81 PERSONAL HISTORY OF FALL: ICD-10-CM

## 2024-06-06 DIAGNOSIS — G30.9 MODERATE ALZHEIMER'S DEMENTIA WITH MOOD DISTURBANCE, UNSPECIFIED TIMING OF DEMENTIA ONSET (HCC): Primary | ICD-10-CM

## 2024-06-06 NOTE — PROGRESS NOTES
Reviewed and Signed Home Health Certification/Recertification and Care Plan (see attached)    Paradise Smith  1935     Agency: [facility]   Medicare #: -  Medical Record #: [id number]   Provider #: [id number]    SOC Date: 5/13/2024  Certification Period: 5/13/2024-7/11/2024  Last F2F: 04/24/2024    Services: PT/OT    Michael Nash MD  6/6/2024 10:08 AM

## 2024-07-19 ENCOUNTER — HOSPITAL ENCOUNTER (OUTPATIENT)
Facility: HOSPITAL | Age: 89
End: 2024-07-19
Payer: MEDICARE

## 2024-07-19 DIAGNOSIS — F03.90 DEMENTIA, UNSPECIFIED DEMENTIA SEVERITY, UNSPECIFIED DEMENTIA TYPE, UNSPECIFIED WHETHER BEHAVIORAL, PSYCHOTIC, OR MOOD DISTURBANCE OR ANXIETY (HCC): ICD-10-CM

## 2024-07-19 PROCEDURE — 70551 MRI BRAIN STEM W/O DYE: CPT

## 2024-09-25 ENCOUNTER — TELEPHONE (OUTPATIENT)
Age: 89
End: 2024-09-25

## 2024-10-29 ENCOUNTER — PATIENT MESSAGE (OUTPATIENT)
Age: 89
End: 2024-10-29

## 2025-02-20 RX ORDER — SODIUM CHLORIDE 9 MG/ML
5-250 INJECTION, SOLUTION INTRAVENOUS PRN
OUTPATIENT
Start: 2025-02-27

## 2025-02-20 RX ORDER — ZOLEDRONIC ACID 0.05 MG/ML
5 INJECTION, SOLUTION INTRAVENOUS ONCE
OUTPATIENT
Start: 2025-02-27 | End: 2025-02-27

## 2025-02-21 ENCOUNTER — TELEPHONE (OUTPATIENT)
Age: 89
End: 2025-02-21

## 2025-02-21 NOTE — TELEPHONE ENCOUNTER
Minerva from [facility] called stating that they have had several patient in their facility to test positive for the flu. She stated that this patient was recently tested and is experiencing a bad cough and congestion. Wanted to know if it were possible for this patient to be prescribed a medication. Minerva stated that the prescription can be faxed to the at [phone], because they have their own pharmacy.    Thanks!  
normal for race

## 2025-02-27 ENCOUNTER — HOSPITAL ENCOUNTER (OUTPATIENT)
Facility: HOSPITAL | Age: 89
Setting detail: INFUSION SERIES
End: 2025-02-27

## 2025-02-27 DIAGNOSIS — M81.0 OSTEOPOROSIS OF FOREARM WITHOUT PATHOLOGICAL FRACTURE: Primary | ICD-10-CM

## 2025-03-14 ENCOUNTER — HOSPITAL ENCOUNTER (OUTPATIENT)
Facility: HOSPITAL | Age: 89
Setting detail: INFUSION SERIES
Discharge: HOME | End: 2025-03-14
Payer: MEDICARE

## 2025-03-14 VITALS
HEIGHT: 60 IN | DIASTOLIC BLOOD PRESSURE: 62 MMHG | SYSTOLIC BLOOD PRESSURE: 105 MMHG | WEIGHT: 155.4 LBS | OXYGEN SATURATION: 90 % | HEART RATE: 65 BPM | TEMPERATURE: 97.9 F | BODY MASS INDEX: 30.51 KG/M2 | RESPIRATION RATE: 18 BRPM

## 2025-03-14 DIAGNOSIS — M81.0 OSTEOPOROSIS OF FOREARM WITHOUT PATHOLOGICAL FRACTURE: Primary | ICD-10-CM

## 2025-03-14 LAB
ANION GAP BLD CALC-SCNC: 11.2 MMOL/L (ref 10–20)
CA-I BLD-MCNC: 1.19 MMOL/L (ref 1.15–1.33)
CHLORIDE BLD-SCNC: 106 MMOL/L (ref 98–107)
CO2 BLD-SCNC: 26.8 MMOL/L (ref 21–32)
CREAT BLD-MCNC: 0.94 MG/DL (ref 0.6–1.3)
GLUCOSE BLD-MCNC: 122 MG/DL (ref 74–99)
POTASSIUM BLD-SCNC: 4.7 MMOL/L (ref 3.5–5.1)
SERVICE CMNT-IMP: ABNORMAL
SODIUM BLD-SCNC: 144 MMOL/L (ref 136–145)

## 2025-03-14 PROCEDURE — 96374 THER/PROPH/DIAG INJ IV PUSH: CPT

## 2025-03-14 PROCEDURE — 80047 BASIC METABLC PNL IONIZED CA: CPT

## 2025-03-14 PROCEDURE — 6360000002 HC RX W HCPCS: Performed by: INTERNAL MEDICINE

## 2025-03-14 RX ORDER — ZOLEDRONIC ACID 0.05 MG/ML
5 INJECTION, SOLUTION INTRAVENOUS ONCE
Status: COMPLETED | OUTPATIENT
Start: 2025-03-14 | End: 2025-03-14

## 2025-03-14 RX ORDER — ZOLEDRONIC ACID 0.05 MG/ML
5 INJECTION, SOLUTION INTRAVENOUS ONCE
Status: CANCELLED | OUTPATIENT
Start: 2026-03-13 | End: 2026-03-13

## 2025-03-14 RX ORDER — SODIUM CHLORIDE 9 MG/ML
5-250 INJECTION, SOLUTION INTRAVENOUS PRN
OUTPATIENT
Start: 2026-03-13

## 2025-03-14 RX ADMIN — ZOLEDRONIC ACID 5 MG: 0.05 INJECTION, SOLUTION INTRAVENOUS at 10:22

## 2025-03-14 NOTE — PROGRESS NOTES
[facility] Progress Note    Date: March 14, 2025        1000: Pt arrived ambulatory to [facility] for Reclast in stable condition.  Assessment completed. PIV placed to right arm with positive blood return. Labs drawn and sent for processing.    Labs reviewed. Criteria for treatment was met. Patient denies any broken bone or dental work.    Patient Vitals for the past 12 hrs:   Temp Pulse Resp BP SpO2   03/14/25 1030 -- -- -- 105/62 --   03/14/25 1000 97.9 °F (36.6 °C) 65 18 113/70 90 %     Recent Results (from the past 12 hours)   POC CHEM 8    Collection Time: 03/14/25 10:11 AM   Result Value Ref Range    POC Ionized Calcium 1.19 1.15 - 1.33 mmol/L    POC Sodium 144 136 - 145 mmol/L    POC Potassium 4.7 3.5 - 5.1 mmol/L    POC Chloride 106 98 - 107 mmol/L    POC TCO2 26.8 21 - 32 mmol/L    Anion Gap, POC 11.2 10 - 20 mmol/L    POC Glucose 122 (H) 74 - 99 mg/dL    POC Creatinine 0.94 0.6 - 1.3 mg/dL    eGFR, POC 58 (L) >60 ml/min/1.73m2    UA Comment Comment Not Indicated.     '    Medications Administered         zoledronic acid (RECLAST) 5 mg/100 mL infusion Admin Date  03/14/2025 Action  New Bag Dose  5 mg Rate  400 mL/hr Route  IntraVENous Documented By  Regina Isaacs, RN               Ms. Smith tolerated the infusion, and had no complaints.    PIV flushed and removed. 2x2 and coban placed    Ms. Smith was discharged from Outpatient Infusion Center in stable condition. Patient is aware there are currently no more appointments scheduled at [facility] at this time.       No future appointments.      REGINA ISAACS, RN, RN  March 14, 2025

## 2025-06-01 SDOH — ECONOMIC STABILITY: FOOD INSECURITY: WITHIN THE PAST 12 MONTHS, YOU WORRIED THAT YOUR FOOD WOULD RUN OUT BEFORE YOU GOT MONEY TO BUY MORE.: NEVER TRUE

## 2025-06-01 SDOH — ECONOMIC STABILITY: TRANSPORTATION INSECURITY
IN THE PAST 12 MONTHS, HAS THE LACK OF TRANSPORTATION KEPT YOU FROM MEDICAL APPOINTMENTS OR FROM GETTING MEDICATIONS?: NO

## 2025-06-01 SDOH — HEALTH STABILITY: PHYSICAL HEALTH: ON AVERAGE, HOW MANY MINUTES DO YOU ENGAGE IN EXERCISE AT THIS LEVEL?: 30 MIN

## 2025-06-01 SDOH — ECONOMIC STABILITY: FOOD INSECURITY: WITHIN THE PAST 12 MONTHS, THE FOOD YOU BOUGHT JUST DIDN'T LAST AND YOU DIDN'T HAVE MONEY TO GET MORE.: NEVER TRUE

## 2025-06-01 SDOH — ECONOMIC STABILITY: INCOME INSECURITY: IN THE LAST 12 MONTHS, WAS THERE A TIME WHEN YOU WERE NOT ABLE TO PAY THE MORTGAGE OR RENT ON TIME?: NO

## 2025-06-01 SDOH — HEALTH STABILITY: PHYSICAL HEALTH: ON AVERAGE, HOW MANY DAYS PER WEEK DO YOU ENGAGE IN MODERATE TO STRENUOUS EXERCISE (LIKE A BRISK WALK)?: 3 DAYS

## 2025-06-01 SDOH — ECONOMIC STABILITY: TRANSPORTATION INSECURITY
IN THE PAST 12 MONTHS, HAS LACK OF TRANSPORTATION KEPT YOU FROM MEETINGS, WORK, OR FROM GETTING THINGS NEEDED FOR DAILY LIVING?: NO

## 2025-06-01 ASSESSMENT — LIFESTYLE VARIABLES
HOW OFTEN DO YOU HAVE A DRINK CONTAINING ALCOHOL: 2-4 TIMES A MONTH
HOW MANY STANDARD DRINKS CONTAINING ALCOHOL DO YOU HAVE ON A TYPICAL DAY: 1
HOW MANY STANDARD DRINKS CONTAINING ALCOHOL DO YOU HAVE ON A TYPICAL DAY: 1 OR 2
HOW OFTEN DO YOU HAVE A DRINK CONTAINING ALCOHOL: 3
HOW OFTEN DO YOU HAVE SIX OR MORE DRINKS ON ONE OCCASION: 1

## 2025-06-01 ASSESSMENT — PATIENT HEALTH QUESTIONNAIRE - PHQ9
SUM OF ALL RESPONSES TO PHQ QUESTIONS 1-9: 0
1. LITTLE INTEREST OR PLEASURE IN DOING THINGS: NOT AT ALL
SUM OF ALL RESPONSES TO PHQ QUESTIONS 1-9: 0
2. FEELING DOWN, DEPRESSED OR HOPELESS: NOT AT ALL

## 2025-06-02 ENCOUNTER — OFFICE VISIT (OUTPATIENT)
Age: 89
End: 2025-06-02
Payer: MEDICARE

## 2025-06-02 VITALS
RESPIRATION RATE: 18 BRPM | SYSTOLIC BLOOD PRESSURE: 115 MMHG | TEMPERATURE: 97.9 F | HEART RATE: 52 BPM | WEIGHT: 160 LBS | OXYGEN SATURATION: 94 % | DIASTOLIC BLOOD PRESSURE: 69 MMHG | BODY MASS INDEX: 31.41 KG/M2 | HEIGHT: 60 IN

## 2025-06-02 DIAGNOSIS — E03.8 SUBCLINICAL HYPOTHYROIDISM: ICD-10-CM

## 2025-06-02 DIAGNOSIS — Z00.00 MEDICARE ANNUAL WELLNESS VISIT, SUBSEQUENT: Primary | ICD-10-CM

## 2025-06-02 DIAGNOSIS — Z78.9 STATIN INTOLERANCE: ICD-10-CM

## 2025-06-02 DIAGNOSIS — Z23 NEED FOR PNEUMOCOCCAL 20-VALENT CONJUGATE VACCINATION: ICD-10-CM

## 2025-06-02 DIAGNOSIS — Z29.11 NEED FOR RSV IMMUNIZATION: ICD-10-CM

## 2025-06-02 DIAGNOSIS — M81.0 OSTEOPOROSIS OF FOREARM WITHOUT PATHOLOGICAL FRACTURE: ICD-10-CM

## 2025-06-02 DIAGNOSIS — G30.9 MODERATE ALZHEIMER'S DEMENTIA WITHOUT BEHAVIORAL DISTURBANCE, PSYCHOTIC DISTURBANCE, MOOD DISTURBANCE, OR ANXIETY, UNSPECIFIED TIMING OF DEMENTIA ONSET (HCC): ICD-10-CM

## 2025-06-02 DIAGNOSIS — F02.B0 MODERATE ALZHEIMER'S DEMENTIA WITHOUT BEHAVIORAL DISTURBANCE, PSYCHOTIC DISTURBANCE, MOOD DISTURBANCE, OR ANXIETY, UNSPECIFIED TIMING OF DEMENTIA ONSET (HCC): ICD-10-CM

## 2025-06-02 DIAGNOSIS — Z23 NEED FOR ZOSTER VACCINATION: ICD-10-CM

## 2025-06-02 DIAGNOSIS — K21.9 GASTROESOPHAGEAL REFLUX DISEASE WITHOUT ESOPHAGITIS: ICD-10-CM

## 2025-06-02 DIAGNOSIS — S51.811A SKIN TEAR OF RIGHT FOREARM WITHOUT COMPLICATION, INITIAL ENCOUNTER: ICD-10-CM

## 2025-06-02 DIAGNOSIS — H91.93 BILATERAL HEARING LOSS, UNSPECIFIED HEARING LOSS TYPE: ICD-10-CM

## 2025-06-02 DIAGNOSIS — F32.1 CURRENT MODERATE EPISODE OF MAJOR DEPRESSIVE DISORDER WITHOUT PRIOR EPISODE (HCC): ICD-10-CM

## 2025-06-02 DIAGNOSIS — G89.29 CHRONIC BILATERAL LOW BACK PAIN WITHOUT SCIATICA: ICD-10-CM

## 2025-06-02 DIAGNOSIS — E78.5 HYPERLIPIDEMIA, MILD: ICD-10-CM

## 2025-06-02 DIAGNOSIS — M54.50 CHRONIC BILATERAL LOW BACK PAIN WITHOUT SCIATICA: ICD-10-CM

## 2025-06-02 PROCEDURE — G0439 PPPS, SUBSEQ VISIT: HCPCS | Performed by: FAMILY MEDICINE

## 2025-06-02 PROCEDURE — 1159F MED LIST DOCD IN RCRD: CPT | Performed by: FAMILY MEDICINE

## 2025-06-02 PROCEDURE — 1160F RVW MEDS BY RX/DR IN RCRD: CPT | Performed by: FAMILY MEDICINE

## 2025-06-02 PROCEDURE — 1123F ACP DISCUSS/DSCN MKR DOCD: CPT | Performed by: FAMILY MEDICINE

## 2025-06-02 RX ORDER — ZOSTER VACCINE RECOMBINANT, ADJUVANTED 50 MCG/0.5
0.5 KIT INTRAMUSCULAR SEE ADMIN INSTRUCTIONS
Qty: 0.5 ML | Refills: 0 | Status: SHIPPED | OUTPATIENT
Start: 2025-06-02 | End: 2025-11-29

## 2025-06-02 NOTE — PATIENT INSTRUCTIONS

## 2025-06-02 NOTE — PROGRESS NOTES
Identified pt with two pt identifiers(name and ). Reviewed record in preparation for visit and have obtained necessary documentation.  Chief Complaint   Patient presents with    Medicare AWV     Pt reports to [facility]. No concerns today        Health Maintenance Due   Topic    Shingles vaccine (1 of 2)    Pneumococcal 50+ years Vaccine (1 of 1 - PCV)    Respiratory Syncytial Virus (RSV) Pregnant or age 60 yrs+ (1 - 1-dose 75+ series)    COVID-19 Vaccine ( season)    Annual Wellness Visit (Medicare Advantage)        Vitals:    25 1013   BP: 115/69   BP Site: Left Upper Arm   Patient Position: Sitting   BP Cuff Size: Medium Adult   Pulse: 52   Resp: 18   Temp: 97.9 °F (36.6 °C)   TempSrc: Oral   SpO2: 94%   Weight: 72.6 kg (160 lb)   Height: 1.524 m (5')         \"Have you been to the ER, urgent care clinic since your last visit?  Hospitalized since your last visit?\"    NO    “Have you seen or consulted any other health care providers outside of [facility] since your last visit?”    NO            Click Here for Release of Records Request     This patient is accompanied in the office by her child (SON ).  I have received verbal consent from Paradise Smith to discuss any/all medical information while they are present in the room.

## 2025-06-02 NOTE — PROGRESS NOTES
Medicare Annual Wellness Visit    Paradise Smith is here for Medicare AWV (Pt reports to AWV. No concerns today)    Medicare annual wellness visit, subsequent  Paradise Smith was counseled on age-appropriate/ guideline-based risk prevention behaviors and screening for a 89 y.o. year old   female .  We also discussed adjustments in screening based on family history if necessary.   Printed instructions for preventative screening guidelines and healthy behaviors given to patient with after visit summary.  - She is encouraged to continue her active lifestyle and maintain social connections.  - She is advised to review her advanced care planning and living gonzalez annually to ensure they align with her current wishes.  - She is advised to exercise caution when moving around to prevent falls.  - She is advised to monitor the healing process of the skin tear on her arm. If it does not heal completely, a closer examination may be necessary.      - She is advised to consider receiving the RSV vaccine, as it is now recommended for adults over the age of 70.  - She is advised to check with [facility] regarding her RSV vaccination status.  - Laboratory tests will be conducted today to monitor kidney function and electrolyte levels, especially considering her current medications.  - A transition of care to Dr. Sidney Weaver is planned towards the end of 06/2025.    Need for pneumococcal 20-valent conjugate vaccination  -     pneumococcal 20-valent conjugat (PREVNAR) 0.5 ML DRU inj; Inject 0.5 mLs into the muscle once for 1 dose, Disp-0.5 mL, R-0Print  Need for RSV immunization  -     respiratory syncytial vaccine, adjuvanted (AREXVY) 120 MCG/0.5ML injection; Inject 0.5 mLs into the muscle once for 1 dose, Disp-0.5 mL, R-0Print  Need for zoster vaccination  -     zoster recombinant adjuvanted vaccine (SHINGRIX) 50 MCG/0.5ML SUSR injection; Inject 0.5 mLs into the muscle See Admin Instructions 1 dose now and repeat in 2-6

## 2025-06-03 LAB
ALBUMIN SERPL-MCNC: 3.5 G/DL (ref 3.5–5)
ALBUMIN/GLOB SERPL: 1.2 (ref 1.1–2.2)
ALP SERPL-CCNC: 78 U/L (ref 45–117)
ALT SERPL-CCNC: 18 U/L (ref 12–78)
ANION GAP SERPL CALC-SCNC: 7 MMOL/L (ref 2–12)
AST SERPL-CCNC: 18 U/L (ref 15–37)
BILIRUB SERPL-MCNC: 0.5 MG/DL (ref 0.2–1)
BUN SERPL-MCNC: 15 MG/DL (ref 6–20)
BUN/CREAT SERPL: 17 (ref 12–20)
CALCIUM SERPL-MCNC: 9.4 MG/DL (ref 8.5–10.1)
CHLORIDE SERPL-SCNC: 104 MMOL/L (ref 97–108)
CHOLEST SERPL-MCNC: 237 MG/DL
CO2 SERPL-SCNC: 28 MMOL/L (ref 21–32)
CREAT SERPL-MCNC: 0.88 MG/DL (ref 0.55–1.02)
ERYTHROCYTE [DISTWIDTH] IN BLOOD BY AUTOMATED COUNT: 13.8 % (ref 11.5–14.5)
GLOBULIN SER CALC-MCNC: 3 G/DL (ref 2–4)
GLUCOSE SERPL-MCNC: 96 MG/DL (ref 65–100)
HCT VFR BLD AUTO: 43.3 % (ref 35–47)
HDLC SERPL-MCNC: 66 MG/DL
HDLC SERPL: 3.6 (ref 0–5)
HGB BLD-MCNC: 13.2 G/DL (ref 11.5–16)
LDLC SERPL CALC-MCNC: 140.2 MG/DL (ref 0–100)
MCH RBC QN AUTO: 28 PG (ref 26–34)
MCHC RBC AUTO-ENTMCNC: 30.5 G/DL (ref 30–36.5)
MCV RBC AUTO: 91.7 FL (ref 80–99)
NRBC # BLD: 0 K/UL (ref 0–0.01)
NRBC BLD-RTO: 0 PER 100 WBC
PLATELET # BLD AUTO: 300 K/UL (ref 150–400)
PMV BLD AUTO: 9.7 FL (ref 8.9–12.9)
POTASSIUM SERPL-SCNC: 4.8 MMOL/L (ref 3.5–5.1)
PROT SERPL-MCNC: 6.5 G/DL (ref 6.4–8.2)
RBC # BLD AUTO: 4.72 M/UL (ref 3.8–5.2)
SODIUM SERPL-SCNC: 139 MMOL/L (ref 136–145)
TRIGL SERPL-MCNC: 154 MG/DL
TSH SERPL DL<=0.05 MIU/L-ACNC: 2.53 UIU/ML (ref 0.36–3.74)
VLDLC SERPL CALC-MCNC: 30.8 MG/DL
WBC # BLD AUTO: 6.5 K/UL (ref 3.6–11)

## 2025-07-14 ENCOUNTER — TRANSCRIBE ORDERS (OUTPATIENT)
Facility: HOSPITAL | Age: 89
End: 2025-07-14

## 2025-07-14 DIAGNOSIS — M81.0 AGE-RELATED OSTEOPOROSIS WITHOUT CURRENT PATHOLOGICAL FRACTURE: Primary | ICD-10-CM

## (undated) DEVICE — CATH IV AUTOGRD BC BLU 22GA 25 -- INSYTE

## (undated) DEVICE — BONE SCREW
Type: IMPLANTABLE DEVICE | Site: ANKLE | Status: NON-FUNCTIONAL
Brand: VARIAX
Removed: 2021-12-02

## (undated) DEVICE — PADDING CST 6IN STERILE --

## (undated) DEVICE — Device

## (undated) DEVICE — SYR 3ML LL TIP 1/10ML GRAD --

## (undated) DEVICE — SPONGE GZ W4XL4IN COT 12 PLY TYP VII WVN C FLD DSGN

## (undated) DEVICE — DRAPE C-ARMOUR C-ARM KIT --

## (undated) DEVICE — ADULT SPO2 SENSOR: Brand: NELLCOR

## (undated) DEVICE — SET ADMIN 16ML TBNG L100IN 2 Y INJ SITE IV PIGGY BK DISP

## (undated) DEVICE — KIT COLON W/ 1.1OZ LUB AND 2 END

## (undated) DEVICE — SNARE ENDOSCP M L240CM W27MM SHTH DIA2.4MM CHN 2.8MM OVL

## (undated) DEVICE — CONTAINER SPEC 20 ML LID NEUT BUFF FORMALIN 10 % POLYPR STS

## (undated) DEVICE — TUBING SUCT 10FR MAL ALUM SHFT FN CAP VENT UNIV CONN W/ OBT

## (undated) DEVICE — KENDALL RADIOLUCENT FOAM MONITORING ELECTRODE -RECTANGULAR SHAPE: Brand: KENDALL

## (undated) DEVICE — NDL FLTR TIP 5 MIC 18GX1.5IN --

## (undated) DEVICE — HANDPIECE SET WITH COAXIAL HIGH FLOW TIP AND SUCTION TUBE: Brand: INTERPULSE

## (undated) DEVICE — GLOVE ORTHO 8   MSG9480

## (undated) DEVICE — GLOVE SURG SZ 8 L12IN FNGR THK79MIL GRN LTX FREE

## (undated) DEVICE — UNTHREADED GUIDE WIRE: Brand: FIXOS

## (undated) DEVICE — PACK,BASIC,SIRUS,V: Brand: MEDLINE

## (undated) DEVICE — POLYP TRAP: Brand: TRAPEASE®

## (undated) DEVICE — GLOVE ORTHO 7 1/2   MSG9475

## (undated) DEVICE — SUT ETHLN 3-0 18IN PS2 BLK --

## (undated) DEVICE — SUTURE VCRL SZ 0 L27IN ABSRB UD L36MM CP-1 1/2 CIR REV CUT J267H

## (undated) DEVICE — SUT ETHLN 3-0 18IN PS1 BLK --

## (undated) DEVICE — 1200 GUARD II KIT W/5MM TUBE W/O VAC TUBE: Brand: GUARDIAN

## (undated) DEVICE — SPEEDGUIDE DRILL AO: Brand: VARIAX

## (undated) DEVICE — BANDAGE COMPR W6INXL10YD ST M E WHITE/BEIGE

## (undated) DEVICE — CANISTER, RIGID, 3000CC: Brand: MEDLINE INDUSTRIES, INC.

## (undated) DEVICE — STOCKINETTE,IMPERVIOUS,12X48,STERILE: Brand: MEDLINE

## (undated) DEVICE — SUTURE VCRL 3-0 L36IN ABSRB UD CT L40MM 1/2 CIR TAPERPOINT J956H

## (undated) DEVICE — PADDING CAST SPEC 6INX4YD COT --

## (undated) DEVICE — SOLIDIFIER MEDC 1200ML -- CONVERT TO 356117

## (undated) DEVICE — CANN NASAL O2 CAPNOGRAPHY AD -- FILTERLINE

## (undated) DEVICE — BAG BELONG PT PERS CLEAR HANDL

## (undated) DEVICE — DRESSING,GAUZE,XEROFORM,CURAD,5"X9",ST: Brand: CURAD

## (undated) DEVICE — BASIN EMSIS 16OZ GRAPHITE PLAS KID SHP MOLD GRAD FOR ORAL

## (undated) DEVICE — CANNULATED DRILL

## (undated) DEVICE — BAG SPEC BIOHZRD 10 X 10 IN --

## (undated) DEVICE — SYR 5ML 1/5 GRAD LL NSAF LF --

## (undated) DEVICE — NDL PRT INJ NSAF BLNT 18GX1.5 --

## (undated) DEVICE — PAD,ABDOMINAL,5"X9",ST,LF,25/BX: Brand: MEDLINE INDUSTRIES, INC.

## (undated) DEVICE — BNDG ELAS HK LOOP 4X5YD NS -- MATRIX

## (undated) DEVICE — EXTREMITY-SFMCASU: Brand: MEDLINE INDUSTRIES, INC.

## (undated) DEVICE — SUTURE MCRYL SZ 3-0 L27IN ABSRB UD L19MM PS-2 3/8 CIR PRIM Y427H

## (undated) DEVICE — 3M™ STERI-DRAPE™ U-DRAPE 1015: Brand: STERI-DRAPE™